# Patient Record
Sex: MALE | Race: WHITE | NOT HISPANIC OR LATINO | Employment: OTHER | URBAN - METROPOLITAN AREA
[De-identification: names, ages, dates, MRNs, and addresses within clinical notes are randomized per-mention and may not be internally consistent; named-entity substitution may affect disease eponyms.]

---

## 2017-02-13 ENCOUNTER — OFFICE VISIT (OUTPATIENT)
Dept: INTERNAL MEDICINE | Facility: MEDICAL CENTER | Age: 57
End: 2017-02-13
Payer: MEDICAID

## 2017-02-13 VITALS
BODY MASS INDEX: 23.13 KG/M2 | TEMPERATURE: 97.8 F | WEIGHT: 161.6 LBS | HEART RATE: 80 BPM | SYSTOLIC BLOOD PRESSURE: 144 MMHG | RESPIRATION RATE: 18 BRPM | OXYGEN SATURATION: 98 % | HEIGHT: 70 IN | DIASTOLIC BLOOD PRESSURE: 94 MMHG

## 2017-02-13 DIAGNOSIS — Z79.891 CHRONIC USE OF OPIATE FOR THERAPEUTIC PURPOSE: ICD-10-CM

## 2017-02-13 DIAGNOSIS — G43.C0 PERIODIC HEADACHE SYNDROME, NOT INTRACTABLE: ICD-10-CM

## 2017-02-13 DIAGNOSIS — M79.7 FIBROMYALGIA: ICD-10-CM

## 2017-02-13 DIAGNOSIS — R03.0 ELEVATED BLOOD-PRESSURE READING WITHOUT DIAGNOSIS OF HYPERTENSION: ICD-10-CM

## 2017-02-13 DIAGNOSIS — M06.9 RHEUMATOID ARTHRITIS, INVOLVING UNSPECIFIED SITE, UNSPECIFIED RHEUMATOID FACTOR PRESENCE: ICD-10-CM

## 2017-02-13 PROCEDURE — 99214 OFFICE O/P EST MOD 30 MIN: CPT | Mod: GC | Performed by: INTERNAL MEDICINE

## 2017-02-13 RX ORDER — OXYCODONE AND ACETAMINOPHEN 10; 325 MG/1; MG/1
1-2 TABLET ORAL EVERY 8 HOURS PRN
Qty: 90 TAB | Refills: 0 | Status: SHIPPED | OUTPATIENT
Start: 2017-02-17 | End: 2017-02-13 | Stop reason: SDUPTHER

## 2017-02-13 RX ORDER — MORPHINE SULFATE 30 MG/1
30 TABLET, FILM COATED, EXTENDED RELEASE ORAL 3 TIMES DAILY
Qty: 90 TAB | Refills: 0 | Status: SHIPPED | OUTPATIENT
Start: 2017-02-17 | End: 2017-02-13 | Stop reason: SDUPTHER

## 2017-02-13 RX ORDER — MORPHINE SULFATE 30 MG/1
30 TABLET, FILM COATED, EXTENDED RELEASE ORAL 3 TIMES DAILY
Qty: 90 TAB | Refills: 0 | Status: SHIPPED | OUTPATIENT
Start: 2017-03-17 | End: 2017-04-24

## 2017-02-13 RX ORDER — CYCLOBENZAPRINE HCL 10 MG
TABLET ORAL
Qty: 60 TAB | Refills: 0 | Status: SHIPPED | OUTPATIENT
Start: 2017-02-13 | End: 2017-04-24 | Stop reason: SDUPTHER

## 2017-02-13 RX ORDER — OXYCODONE AND ACETAMINOPHEN 10; 325 MG/1; MG/1
1-2 TABLET ORAL EVERY 8 HOURS PRN
Qty: 90 TAB | Refills: 0 | Status: SHIPPED | OUTPATIENT
Start: 2017-03-17 | End: 2017-04-24 | Stop reason: SDUPTHER

## 2017-02-13 ASSESSMENT — LIFESTYLE VARIABLES: HISTORY_ALCOHOL_USE: 0

## 2017-02-13 ASSESSMENT — ENCOUNTER SYMPTOMS: DEPRESSION: 0

## 2017-02-13 ASSESSMENT — PATIENT HEALTH QUESTIONNAIRE - PHQ9: CLINICAL INTERPRETATION OF PHQ2 SCORE: 0

## 2017-02-13 ASSESSMENT — PAIN SCALES - GENERAL: PAINLEVEL: 8=MODERATE-SEVERE PAIN

## 2017-02-13 NOTE — PROGRESS NOTES
Established Patient    Christophe presents today with the following:    CC: Medication refill    HPI:     Rheumatoid arthritis: Patient has pain in his ankles and knees and upper back and neck. He had not been taking his Cimzia due to a joint infection but he will be restarting it. He is needing a refill of the morphine and oxycodone. She does see Dr. Gomez for his rheumatoid arthritis.    Fibromyalgia: He states he has tried amitriptyline in the past with no improvement. He says the narcotics control his symptoms.    Migraines: He does get about 3 attacks per week which are relieved by Fioricet. He has never seen an urologist for this.    Elevated blood pressure readings: Patient's blood pressure today is 144/94. He denies any headaches, blurry vision, chest pain.        Patient Active Problem List    Diagnosis Date Noted   • Chronic use of opiate for therapeutic purpose 02/13/2017   • Elevated blood-pressure reading without diagnosis of hypertension 09/22/2016   • Right knee pain 09/20/2016   • Dyspnea 08/18/2016   • Chest pain 08/18/2016   • Headache, migraine 04/29/2016   • Fibromyalgia 04/29/2016   • Hypothyroidism 11/12/2015   • Rheumatoid arthritis (CMS-[facility]) 07/07/2009   • GERD (gastroesophageal reflux disease) 07/07/2009   • Other and unspecified hyperlipidemia 07/07/2009       Current Outpatient Prescriptions   Medication Sig Dispense Refill   • cyclobenzaprine (FLEXERIL) 10 MG Tab TAKE 1 TABLET BY MOUTH 2 TIMES A DAY AS NEEDED FOR MODERATE PAIN OR MUSCLE SPASMS 60 Tab 0   • [START ON 3/17/2017] morphine ER (MS CONTIN) 30 MG Tab CR tablet Take 1 Tab by mouth 3 times a day. 90 Tab 0   • [START ON 3/17/2017] oxycodone-acetaminophen (PERCOCET-10)  MG Tab Take 1-2 Tabs by mouth every 8 hours as needed for Severe Pain. 90 Tab 0   • levothyroxine (SYNTHROID) 150 MCG Tab TAKE 1 TABLET BY MOUTH EVERY MORNING BEFORE BREAKFAST. 30 Tab 0   • butalbital-acetaminophen-caffeine-codeine (FIORICET W/CODEINE)  "-79-30 MG per capsule TAKE 1 CAPSULE BY MOUTH EVERY 6 HOURS AS NEEDED FOR MIGRAINE HEADACHE 30 Cap 2   • ranitidine (ZANTAC) 300 MG tablet TAKE 1 TABLET BY MOUTH TWICE DAILY 60 Tab 5   • loratadine/pseudo SR (CLARITIN D) 5-120 MG TABLET SR 12 HR Take 1 Tab by mouth 2 times a day. 60 Tab 3   • fluticasone (FLONASE) 50 MCG/ACT nasal spray Spray 1 Spray in nose every day. 16 g 11   • Dextromethorphan-Guaifenesin (TUSSIN DM)  MG/5ML Syrup Take 10 mL by mouth every 6 hours as needed. 840 mL 0   • albuterol 108 (90 BASE) MCG/ACT Aero Soln inhalation aerosol Inhale 2 Puffs by mouth every 6 hours as needed for Shortness of Breath. 8.5 g 3   • Certolizumab Pegol (CIMZIA) 2 X 200 MG Kit Inject  as instructed.       No current facility-administered medications for this visit.       ROS: As per HPI. Additional pertinent symptoms as noted below.    All others negative    /94 mmHg  Pulse 80  Temp(Src) 36.6 °C (97.8 °F)  Resp 18  Ht 1.778 m (5' 10\")  Wt 73.301 kg (161 lb 9.6 oz)  BMI 23.19 kg/m2  SpO2 98%    Physical Exam   Constitutional:  oriented to person, place, and time. No distress.   Eyes: Pupils are equal, round, and reactive to light. No scleral icterus.  Cardiovascular: Normal rate, regular rhythm and normal heart sounds.  Exam reveals no gallop and no friction rub.  No murmur heard.  Pulmonary/Chest: Breath sounds normal. Chest wall is not dull to percussion.   Musculoskeletal:   no edema.   Neurological: alert and oriented to person, place, and time.       Assessment and Plan    1. Chronic use of opiate for therapeutic purpose  Chronic pain recheck:   Last dose of controlled substance: 2/13  Chronic pain treated with Morphine taken 3 times a day and Percocet taken 3 times a day   Current alcohol or substance use: no    Consequences of Chronic Opiate therapy:  (5 A's)  Analgesia:  not changed  Activity:  not changed  Adverse Events:  constipation  Aberrant Behaviors: no inappropriate refills " requested, lost or stolen meds reported.   Affect/Mood: normal speech pattern and content    Nonnarcotic treatments that are being used: none    Pain management agreement initiated/updated and signed on: Dec 2016  Urine drug screen done: Dec 5, 2016, which was reviewed today and is consistent with prescribed medications.    I have reviewed the medical records, the Prescription Monitoring Program and I have determined that controlled substance treatment is medically indicated.      2. Periodic headache syndrome, not intractable  - Patient has migraines 3 times per week which are relieved by Fioricet  - Patient referred to neurology for migraine prophylaxis and further evaluation    3. Fibromyalgia  - Patient states he has tried amitriptyline in the past without success  - He states the narcotics control his pain. Patient counseled on narcotics are not medications used to treat fibromyalgia.  - Patient educated and informed that we will be slowly tapering his narcotics. The plan for his next visit will be to change either the morphine extended release to 30 mg twice a day or decrease the Percocets to 5 mg 3 times a day.  - Physical therapy and pain management referral placed    4. Rheumatoid arthritis, involving unspecified site, unspecified rheumatoid factor presence (CMS-HCC)  - Patient is currently taking Cimzia and narcotics for this  - He states his symptoms are best relieved when he was taking prednisone  - Patient advised to speak with his rheumatologist Dr. Gomez in regards to this    5. Elevated blood-pressure reading without diagnosis of hypertension  - Patient's blood pressure is 144/94  - We'll have patient come in 1-2 weeks for nurse's visit to recheck blood pressure  - If blood pressure remains elevated with systolic over 140 or diastolic above 90 then at that time we'll start chlorthalidone and repeat BMP to be done one week after having started medication      Followup: Return in about 10 weeks  (around 4/24/2017) for pain med refill. Schedule nurses visit in 2 weeks for BP check.      Signed by: Itz Hogan M.D.

## 2017-02-13 NOTE — MR AVS SNAPSHOT
"        Christophe Leary   2017 1:15 PM   Office Visit   MRN: [id number]    Department:  [facility] - Internal Med   Dept Phone:  [phone]    Description:  Male : 1960   Provider:  Itz Hogan M.D.           Reason for Visit     Back Pain medication refill      Allergies as of 2017     No Known Allergies      You were diagnosed with     Chronic use of opiate for therapeutic purpose   [8460567]       Periodic headache syndrome, not intractable   [500538]       Fibromyalgia   [670824]       Rheumatoid arthritis, involving unspecified site, unspecified rheumatoid factor presence (CMS-HCC)   [7064558]       Rheumatoid arthritis involving left hand, unspecified rheumatoid factor presence (CMS-[facility])   [1433456]       Elevated blood-pressure reading without diagnosis of hypertension   [796671]         Vital Signs     Blood Pressure Pulse Temperature Respirations Height Weight    144/94 mmHg 80 36.6 °C (97.8 °F) 18 1.778 m (5' 10\") 73.301 kg (161 lb 9.6 oz)    Body Mass Index Oxygen Saturation Smoking Status             23.19 kg/m2 98% Never Smoker          Basic Information     Date Of Birth Sex Race Ethnicity Preferred Language    1960 Male White Non- English      Your appointments     Mar 03, 2017  1:30 PM   Non Provider 1 with [facility] - Internal Medicine (--)    [address] [place] [id number] [phone]           You will be receiving a confirmation call a few days before your appointment from our automated call confirmation system.            2017  1:45 PM   Established Patient with Itz Hogan M.D.   [facility] - Internal Medicine (--)    [address] [place] [id number] [phone]           You will be receiving a confirmation call a few days before your appointment from our automated call confirmation system.              Problem List              ICD-10-CM Priority Class " Noted - Resolved    Rheumatoid arthritis (CMS-HCC) M06.9   7/7/2009 - Present    GERD (gastroesophageal reflux disease) K21.9   7/7/2009 - Present    Other and unspecified hyperlipidemia E78.5   7/7/2009 - Present    Hypothyroidism E03.9   11/12/2015 - Present    Headache, migraine G43.909   4/29/2016 - Present    Fibromyalgia M79.7   4/29/2016 - Present    Dyspnea R06.00   8/18/2016 - Present    Chest pain R07.9   8/18/2016 - Present    Right knee pain M25.561   9/20/2016 - Present    Elevated blood-pressure reading without diagnosis of hypertension R03.0   9/22/2016 - Present    Chronic use of opiate for therapeutic purpose Z79.899   2/13/2017 - Present      Health Maintenance        Date Due Completion Dates    IMM INFLUENZA (1) 9/1/2016 11/15/2015, 12/23/2014    IMM DTaP/Tdap/Td Vaccine (2 - Td) 5/4/2026 5/4/2016, 8/10/2009    COLONOSCOPY 2/9/2027 2/9/2017 (N/S)    Override on 2/9/2017: (N/S) (PER [facility] NO COLONOSCOPY)            Current Immunizations     Influenza Vaccine Quad Inj (Preserved) 11/15/2015 10:25 AM, 12/23/2014    Pneumococcal polysaccharide vaccine (PPSV-23) 11/15/2015 10:25 AM    TD Vaccine 8/10/2009  5:00 PM    Tdap Vaccine 5/4/2016      Below and/or attached are the medications your provider expects you to take. Review all of your home medications and newly ordered medications with your provider and/or pharmacist. Follow medication instructions as directed by your provider and/or pharmacist. Please keep your medication list with you and share with your provider. Update the information when medications are discontinued, doses are changed, or new medications (including over-the-counter products) are added; and carry medication information at all times in the event of emergency situations     Allergies:  No Known Allergies          Medications  Valid as of: February 13, 2017 -  2:25 PM    Generic Name Brand Name Tablet Size Instructions for use    Albuterol Sulfate (Aero Soln) albuterol 108 (90  BASE) MCG/ACT Inhale 2 Puffs by mouth every 6 hours as needed for Shortness of Breath.        Butalbital-APAP-Caff-Cod (Cap) FIORICET W/CODEINE -48-30 MG TAKE 1 CAPSULE BY MOUTH EVERY 6 HOURS AS NEEDED FOR MIGRAINE HEADACHE        Certolizumab Pegol (Kit) Certolizumab Pegol 2 X 200 MG Inject  as instructed.        Cyclobenzaprine HCl (Tab) FLEXERIL 10 MG TAKE 1 TABLET BY MOUTH 2 TIMES A DAY AS NEEDED FOR MODERATE PAIN OR MUSCLE SPASMS        Dextromethorphan-Guaifenesin (Syrup) TUSSIN DM  MG/5ML Take 10 mL by mouth every 6 hours as needed.        Fluticasone Propionate (Suspension) FLONASE 50 MCG/ACT Spray 1 Spray in nose every day.        Levothyroxine Sodium (Tab) SYNTHROID 150 MCG TAKE 1 TABLET BY MOUTH EVERY MORNING BEFORE BREAKFAST.        Loratadine-Pseudoephedrine (TABLET SR 12 HR) CLARITIN D 5-120 MG Take 1 Tab by mouth 2 times a day.        Morphine Sulfate (Tab CR) MS CONTIN 30 MG Take 1 Tab by mouth 3 times a day.        Oxycodone-Acetaminophen (Tab) PERCOCET-10  MG Take 1-2 Tabs by mouth every 8 hours as needed for Severe Pain.        RaNITidine HCl (Tab) ZANTAC 300 MG TAKE 1 TABLET BY MOUTH TWICE DAILY        .                 Medicines prescribed today were sent to:     [id number] - [place], [place] - [address] [address]    Phone: [phone] Fax: [phone]    Open 24 Hours?: No      Medication refill instructions:       If your prescription bottle indicates you have medication refills left, it is not necessary to call your provider’s office. Please contact your pharmacy and they will refill your medication.    If your prescription bottle indicates you do not have any refills left, you may request refills at any time through one of the following ways: The online [employer] system (except Urgent Care), by calling your provider’s office, or by asking your pharmacy to contact your provider’s office with a refill request. Medication refills are  processed only during regular business hours and may not be available until the next business day. Your provider may request additional information or to have a follow-up visit with you prior to refilling your medication.   *Please Note: Medication refills are assigned a new Rx number when refilled electronically. Your pharmacy may indicate that no refills were authorized even though a new prescription for the same medication is available at the pharmacy. Please request the medicine by name with the pharmacy before contacting your provider for a refill.        Referral     A referral request has been sent to our patient care coordination department. Please allow 3-5 business days for us to process this request and contact you either by phone or mail. If you do not hear from us by the 5th business day, please call us at [phone].           MyChart Status: Patient Declined

## 2017-03-20 DIAGNOSIS — E03.9 HYPOTHYROIDISM, UNSPECIFIED TYPE: ICD-10-CM

## 2017-03-20 RX ORDER — LEVOTHYROXINE SODIUM 0.15 MG/1
150 TABLET ORAL
Qty: 30 TAB | Refills: 2 | Status: SHIPPED | OUTPATIENT
Start: 2017-03-20 | End: 2017-07-30 | Stop reason: SDUPTHER

## 2017-03-20 NOTE — TELEPHONE ENCOUNTER
Last seen: 2/13/17 by Dr. Hogan  Next appt: 4/24/17 with Dr. Hogan    Was the patient seen in the last year in this department? Yes   Does patient have an active prescription for medications requested? No   Received Request Via: Pharmacy

## 2017-04-20 ENCOUNTER — OFFICE VISIT (OUTPATIENT)
Dept: NEUROLOGY | Facility: MEDICAL CENTER | Age: 57
End: 2017-04-20
Payer: MEDICAID

## 2017-04-20 VITALS
HEIGHT: 70 IN | TEMPERATURE: 98.8 F | SYSTOLIC BLOOD PRESSURE: 140 MMHG | WEIGHT: 164 LBS | BODY MASS INDEX: 23.48 KG/M2 | OXYGEN SATURATION: 92 % | DIASTOLIC BLOOD PRESSURE: 86 MMHG | HEART RATE: 89 BPM

## 2017-04-20 DIAGNOSIS — G43.009 MIGRAINE WITHOUT AURA AND WITHOUT STATUS MIGRAINOSUS, NOT INTRACTABLE: Primary | ICD-10-CM

## 2017-04-20 PROCEDURE — 99213 OFFICE O/P EST LOW 20 MIN: CPT | Performed by: PSYCHIATRY & NEUROLOGY

## 2017-04-20 RX ORDER — SUMATRIPTAN 100 MG/1
TABLET, FILM COATED ORAL
Qty: 9 TAB | Refills: 6 | Status: SHIPPED | OUTPATIENT
Start: 2017-04-20 | End: 2017-05-31 | Stop reason: SINTOL

## 2017-04-20 ASSESSMENT — ENCOUNTER SYMPTOMS: HEADACHES: 1

## 2017-04-21 NOTE — PROGRESS NOTES
"Subjective:      Christophe Leary is a 56 y.o. male who presents for follow-up with a history of headaches.    HPI    Patient states that he has been suffering from headaches for most of his life, but over the last year or so, they seem to have become a more persistent problem. He describes milder daily headaches which are holocephalic, not incapacitating, and for which he takes aspirin. Migraines themselves started in his childhood, he describes some visual symptoms preceding the headaches on occasion, most of the time it is retro-orbital, bilateral, excruciatingly painful, with nausea, impaired concentration, hypersensitivities, etc. The headaches now occur maybe once every month or so, but they are unpredictable, he has never been able to identify triggers. He has not been on prescription medications other than Fioricet with codeine, his previously prescribing physician will no longer fill his prescriptions, he now presents requesting more medication. There has been no change in his headache pattern recently.    Medical, surgical and family histories are reviewed, and there are no new drug allergies. Is on Synthroid, Zantac, Percocet, the rest as per the electronic health record, noncontributory from my standpoint.    Review of Systems   Neurological: Positive for headaches.   All other systems reviewed and are negative.       Objective:     /86 mmHg  Pulse 89  Temp(Src) 37.1 °C (98.8 °F)  Ht 1.778 m (5' 10\")  Wt 74.39 kg (164 lb)  BMI 23.53 kg/m2  SpO2 92%     Physical Exam    He appears no acute distress. Vital signs are stable. There is no malar rash. The neck is supple. Including mental status, cranial nerves, motor, reflexes, coordination, and sensory evaluations, the examination is fully intact.     Assessment/Plan:     1. Migraine without aura and without status migrainosus, not intractable  He does want a more effective rescue regimen, the headaches are not frequent enough that I think it " warrants maintenance therapies. He was warned about taking too much aspirin. He is using Percocet for other pain symptoms, but this too can play a role if he has had to use it on a daily basis. He was told that Fioricet with and without codeine is not prescribed to this office, but Imitrex 100 mg tablets can be used instead, and it is more effective. The nature of the drug was reviewed, side effects as well. He knows the difference between his milder headaches and the more severe attacks, he was educated as to taking the medication as early as is possible. Repeat the dose in one hour if needed. I can follow up with him in the future as needed.    - sumatriptan (IMITREX) 100 MG tablet; 1 tab at headache onset; repeat in 1 hour prn  Dispense: 9 Tab; Refill: 6    Time: Evaluation for 20 minutes for exam, review, discussion, and education  Discussion: As mentioned in the assessment, over 60% of the time spent face-to-face counseling and coordinating care

## 2017-04-24 ENCOUNTER — OFFICE VISIT (OUTPATIENT)
Dept: INTERNAL MEDICINE | Facility: MEDICAL CENTER | Age: 57
End: 2017-04-24
Payer: MEDICAID

## 2017-04-24 VITALS
SYSTOLIC BLOOD PRESSURE: 170 MMHG | HEIGHT: 70 IN | OXYGEN SATURATION: 97 % | HEART RATE: 102 BPM | WEIGHT: 167 LBS | BODY MASS INDEX: 23.91 KG/M2 | DIASTOLIC BLOOD PRESSURE: 98 MMHG | TEMPERATURE: 98.5 F | RESPIRATION RATE: 18 BRPM

## 2017-04-24 DIAGNOSIS — I10 ESSENTIAL HYPERTENSION: ICD-10-CM

## 2017-04-24 DIAGNOSIS — M06.9 RHEUMATOID ARTHRITIS, INVOLVING UNSPECIFIED SITE, UNSPECIFIED RHEUMATOID FACTOR PRESENCE: ICD-10-CM

## 2017-04-24 DIAGNOSIS — Z79.891 CHRONIC USE OF OPIATE FOR THERAPEUTIC PURPOSE: ICD-10-CM

## 2017-04-24 DIAGNOSIS — M79.7 FIBROMYALGIA: ICD-10-CM

## 2017-04-24 DIAGNOSIS — G43.009 MIGRAINE WITHOUT AURA AND WITHOUT STATUS MIGRAINOSUS, NOT INTRACTABLE: ICD-10-CM

## 2017-04-24 PROCEDURE — 99214 OFFICE O/P EST MOD 30 MIN: CPT | Mod: GC | Performed by: INTERNAL MEDICINE

## 2017-04-24 RX ORDER — BUTALBITAL, ACETAMINOPHEN, CAFFEINE AND CODEINE PHOSPHATE 50; 325; 40; 30 MG/1; MG/1; MG/1; MG/1
1 CAPSULE ORAL
Qty: 15 CAP | Refills: 0 | Status: CANCELLED | OUTPATIENT
Start: 2017-04-24

## 2017-04-24 RX ORDER — BUTALBITAL, ACETAMINOPHEN, CAFFEINE AND CODEINE PHOSPHATE 50; 325; 40; 30 MG/1; MG/1; MG/1; MG/1
CAPSULE ORAL
COMMUNITY
Start: 2017-02-25 | End: 2017-04-24

## 2017-04-24 RX ORDER — MORPHINE SULFATE 30 MG/1
30 TABLET, FILM COATED, EXTENDED RELEASE ORAL EVERY 12 HOURS
Qty: 14 TAB | Refills: 0 | Status: SHIPPED | OUTPATIENT
Start: 2017-05-22 | End: 2017-05-31 | Stop reason: SDUPTHER

## 2017-04-24 RX ORDER — OXYCODONE AND ACETAMINOPHEN 10; 325 MG/1; MG/1
1 TABLET ORAL EVERY 8 HOURS PRN
Qty: 90 TAB | Refills: 0 | Status: CANCELLED | OUTPATIENT
Start: 2017-04-24

## 2017-04-24 RX ORDER — OXYCODONE AND ACETAMINOPHEN 10; 325 MG/1; MG/1
1 TABLET ORAL EVERY 8 HOURS PRN
Qty: 21 TAB | Refills: 0 | Status: SHIPPED | OUTPATIENT
Start: 2017-05-22 | End: 2017-05-31 | Stop reason: SDUPTHER

## 2017-04-24 RX ORDER — MORPHINE SULFATE 30 MG/1
30 TABLET, FILM COATED, EXTENDED RELEASE ORAL EVERY 12 HOURS
Qty: 60 TAB | Refills: 0 | Status: SHIPPED | OUTPATIENT
Start: 2017-04-24 | End: 2017-04-24 | Stop reason: SDUPTHER

## 2017-04-24 RX ORDER — MORPHINE SULFATE 30 MG/1
30 TABLET, FILM COATED, EXTENDED RELEASE ORAL 3 TIMES DAILY
Qty: 90 TAB | Refills: 0 | Status: CANCELLED | OUTPATIENT
Start: 2017-04-24

## 2017-04-24 RX ORDER — CHLORTHALIDONE 25 MG/1
25 TABLET ORAL DAILY
Qty: 30 TAB | Refills: 1 | Status: SHIPPED | OUTPATIENT
Start: 2017-04-24 | End: 2017-11-02

## 2017-04-24 RX ORDER — OXYCODONE AND ACETAMINOPHEN 10; 325 MG/1; MG/1
1 TABLET ORAL EVERY 8 HOURS PRN
Qty: 90 TAB | Refills: 0 | Status: SHIPPED | OUTPATIENT
Start: 2017-04-24 | End: 2017-04-24 | Stop reason: SDUPTHER

## 2017-04-24 ASSESSMENT — PAIN SCALES - GENERAL: PAINLEVEL: 8=MODERATE-SEVERE PAIN

## 2017-04-24 ASSESSMENT — PATIENT HEALTH QUESTIONNAIRE - PHQ9: CLINICAL INTERPRETATION OF PHQ2 SCORE: 0

## 2017-04-24 NOTE — PROGRESS NOTES
Established Patient    Christophe presents today with the following:    CC: medication refill    HPI:     57 y/o M presenting with:     Fibromyalgia/chronic pain/rheumatoid arthritis: Up until now patient has been on MS Contin 30 mg 3 times a day and Percocet 10 mg 3 times a day. He states he has been on this medication regimen for over 10 years now. During last visit we had discussed going down on his narcotics and setting him up with pain management.    Hypertension: Patient has had his blood pressure up in the 140s systolic for his last several visits and today it was 170 and the recheck was 150. He denies any headaches, chest pain, abdominal pain, blurry vision.    Migraines: He was previously on Fioricet but this is discontinued. Patient did see Dr. Magaña with neurology several days back and he did not feel that patient's daily headaches were migrainous in origin. Although he did prescribe him any tracks for the more severe headaches which did sound more like a migraine. He is currently headache free and denies any photophobia, nausea, vomiting.    Patient Active Problem List    Diagnosis Date Noted   • Chronic use of opiate for therapeutic purpose 02/13/2017   • Essential hypertension 09/22/2016   • Right knee pain 09/20/2016   • Dyspnea 08/18/2016   • Chest pain 08/18/2016   • Headache, migraine 04/29/2016   • Fibromyalgia 04/29/2016   • Rheumatoid arthritis (CMS-[facility]) 07/07/2009   • GERD (gastroesophageal reflux disease) 07/07/2009   • Other and unspecified hyperlipidemia 07/07/2009       Current Outpatient Prescriptions   Medication Sig Dispense Refill   • ipratropium-albuterol (COMBIVENT RESPIMAT)  MCG/ACT Aero Soln Inhale 2 Puffs by mouth every day. 1 Inhaler 2   • [START ON 5/22/2017] morphine ER (MS CONTIN) 30 MG Tab CR tablet Take 1 Tab by mouth every 12 hours. 14 Tab 0   • [START ON 5/22/2017] oxycodone-acetaminophen (PERCOCET-10)  MG Tab Take 1 Tab by mouth every 8 hours as needed for  "Severe Pain. 21 Tab 0   • chlorthalidone (HYGROTON) 25 MG Tab Take 1 Tab by mouth every day. 30 Tab 1   • sumatriptan (IMITREX) 100 MG tablet 1 tab at headache onset; repeat in 1 hour prn 9 Tab 6   • levothyroxine (SYNTHROID) 150 MCG Tab Take 1 Tab by mouth Every morning on an empty stomach. 30 Tab 2   • ranitidine (ZANTAC) 300 MG tablet TAKE 1 TABLET BY MOUTH TWICE DAILY 60 Tab 5   • Certolizumab Pegol (CIMZIA) 2 X 200 MG Kit Inject  as instructed.     • cyclobenzaprine (FLEXERIL) 10 MG Tab TAKE 1 TABLET BY MOUTH 2 TIMES A DAY AS NEEDED FOR MODERATE PAIN OR MUSCLE SPASMS 60 Tab 0     No current facility-administered medications for this visit.       ROS: As per HPI. Additional pertinent symptoms as noted below.    All others negative    /98 mmHg  Pulse 102  Temp(Src) 36.9 °C (98.5 °F)  Resp 18  Ht 1.778 m (5' 10\")  Wt 75.751 kg (167 lb)  BMI 23.96 kg/m2  SpO2 97%    Physical Exam   Constitutional:  oriented to person, place, and time. No distress.   Eyes: Pupils are equal, round, and reactive to light. No scleral icterus.  Cardiovascular: Normal rate, regular rhythm and normal heart sounds.  Exam reveals no gallop and no friction rub.  No murmur heard.  Pulmonary/Chest: Breath sounds normal. Chest wall is not dull to percussion.   Musculoskeletal:   no edema.   Neurological: alert and oriented to person, place, and time.   Skin: No cyanosis. Nails show no clubbing.        Assessment and Plan    1. Chronic use of opiate for therapeutic purpose  Chronic pain recheck:   Last dose of controlled substance: 4/21/2017  Chronic pain treated with MS Contin 30 mg taken 3 times a day and Percocet 10 mg 3 times a day  Current alcohol or substance use: no    Consequences of Chronic Opiate therapy:  (5 A's)  Analgesia:  not changed  Activity:  not changed  Adverse Events:  none  Aberrant Behaviors: no inappropriate refills requested, lost or stolen meds reported.   Affect/Mood: normal reasoning    Nonnarcotic " treatments that are being used: flexeril    Pain management agreement initiated/updated and signed on: Dec 2016  Urine drug screen done: Dec 5, 2016 , which was reviewed today and is consistent with prescribed medications.    I have reviewed the medical records, the Prescription Monitoring Program and I have determined that controlled substance treatment is medically indicated.      2. Fibromyalgia  - At length discussion with patient that narcotics and other treatment for fibromyalgia  - He states he has tried multiple medications in the past without relief  - Patient asked to inform clinic with list of medications he is tried in the past and may try other medications  - He has tried physical therapy in the past without relief    3. Essential hypertension  - Several readings of elevated elevated blood pressure when presenting to clinic  - We'll start patient on chlorthalidone 25 mg daily and patient has been advised to get a BMP done next week    4. Rheumatoid arthritis, involving unspecified site, unspecified rheumatoid factor presence (CMS-HCC)  - We will go down on MS Contin from 30 mg 3 times a day to 30 mg twice a day  - During next office visit the plan is to go down on the Percocet from 10 mg 3 times a day to 10 mg twice a day  - Patient still has not started taking his Cimzia as he needs to get it refilled and states he will start taking it soon. Anticipate some of his pain will be relieved once he starts his medication.    5. Migraine without aura and without status migrainosus, not intractable  - Continue Imitrex as needed as. Neurology      Followup: Return in about 5 weeks (around 5/29/2017) for Long.      Signed by: Itz Hogan M.D.

## 2017-04-24 NOTE — MR AVS SNAPSHOT
"        Christophe Leary   2017 1:45 PM   Office Visit   MRN: [id number]    Department:  [facility] Med - Internal Med   Dept Phone:  [phone]    Description:  Male : 1960   Provider:  Itz Hogan M.D.           Reason for Visit     Chronic Opiate Therapy refill pain medications      Allergies as of 2017     No Known Allergies      You were diagnosed with     Chronic use of opiate for therapeutic purpose   [0793295]       Fibromyalgia   [873301]       Rheumatoid arthritis, involving unspecified site, unspecified rheumatoid factor presence (CMS-HCC)   [6247422]       Migraine without aura and without status migrainosus, not intractable   [195641]       Essential hypertension   [7799166]         Vital Signs     Blood Pressure Pulse Temperature Respirations Height Weight    170/98 mmHg 102 36.9 °C (98.5 °F) 18 1.778 m (5' 10\") 75.751 kg (167 lb)    Body Mass Index Oxygen Saturation Smoking Status             23.96 kg/m2 97% Never Smoker          Basic Information     Date Of Birth Sex Race Ethnicity Preferred Language    1960 Male White Non- English      Your appointments     May 31, 2017  3:00 PM   Established Patient with Itz Hogan M.D.   [facility] / [facility] Med - Internal Medicine (--)    [address] [place] [id number] [phone]           You will be receiving a confirmation call a few days before your appointment from our automated call confirmation system.              Problem List              ICD-10-CM Priority Class Noted - Resolved    Rheumatoid arthritis (CMS-HCC) M06.9   2009 - Present    GERD (gastroesophageal reflux disease) K21.9   2009 - Present    Other and unspecified hyperlipidemia E78.5   2009 - Present    Headache, migraine G43.909   2016 - Present    Fibromyalgia M79.7   2016 - Present    Dyspnea R06.00   2016 - Present    Chest pain R07.9   2016 - Present    Right knee pain M25.561   2016 - " Present    Essential hypertension I10   9/22/2016 - Present    Chronic use of opiate for therapeutic purpose Z79.899   2/13/2017 - Present      Health Maintenance        Date Due Completion Dates    IMM DTaP/Tdap/Td Vaccine (2 - Td) 5/4/2026 5/4/2016, 8/10/2009    COLONOSCOPY 2/9/2027 2/9/2017 (N/S)    Override on 2/9/2017: (N/S) (PER [facility] NO COLONOSCOPY)            Current Immunizations     Influenza Vaccine Quad Inj (Preserved) 11/15/2015 10:25 AM, 12/23/2014    Pneumococcal polysaccharide vaccine (PPSV-23) 11/15/2015 10:25 AM    TD Vaccine 8/10/2009  5:00 PM    Tdap Vaccine 5/4/2016      Below and/or attached are the medications your provider expects you to take. Review all of your home medications and newly ordered medications with your provider and/or pharmacist. Follow medication instructions as directed by your provider and/or pharmacist. Please keep your medication list with you and share with your provider. Update the information when medications are discontinued, doses are changed, or new medications (including over-the-counter products) are added; and carry medication information at all times in the event of emergency situations     Allergies:  No Known Allergies          Medications  Valid as of: April 24, 2017 -  3:35 PM    Generic Name Brand Name Tablet Size Instructions for use    Certolizumab Pegol (Kit) Certolizumab Pegol 2 X 200 MG Inject  as instructed.        Chlorthalidone (Tab) HYGROTON 25 MG Take 1 Tab by mouth every day.        Cyclobenzaprine HCl (Tab) FLEXERIL 10 MG TAKE 1 TABLET BY MOUTH 2 TIMES A DAY AS NEEDED FOR MODERATE PAIN OR MUSCLE SPASMS        Ipratropium-Albuterol (Aero Soln) COMBIVENT RESPIMAT  MCG/ACT Inhale 2 Puffs by mouth every day.        Levothyroxine Sodium (Tab) SYNTHROID 150 MCG Take 1 Tab by mouth Every morning on an empty stomach.        Morphine Sulfate (Tab CR) MS CONTIN 30 MG Take 1 Tab by mouth every 12 hours.        Oxycodone-Acetaminophen (Tab) PERCOCET-10   MG Take 1 Tab by mouth every 8 hours as needed for Severe Pain.        RaNITidine HCl (Tab) ZANTAC 300 MG TAKE 1 TABLET BY MOUTH TWICE DAILY        SUMAtriptan Succinate (Tab) IMITREX 100 MG 1 tab at headache onset; repeat in 1 hour prn        .                 Medicines prescribed today were sent to:     [id number] - [place], [place] - [address]    [address] [place] [address]    Phone: [phone] Fax: [phone]    Open 24 Hours?: No      Medication refill instructions:       If your prescription bottle indicates you have medication refills left, it is not necessary to call your provider’s office. Please contact your pharmacy and they will refill your medication.    If your prescription bottle indicates you do not have any refills left, you may request refills at any time through one of the following ways: The online [employer] system (except Urgent Care), by calling your provider’s office, or by asking your pharmacy to contact your provider’s office with a refill request. Medication refills are processed only during regular business hours and may not be available until the next business day. Your provider may request additional information or to have a follow-up visit with you prior to refilling your medication.   *Please Note: Medication refills are assigned a new Rx number when refilled electronically. Your pharmacy may indicate that no refills were authorized even though a new prescription for the same medication is available at the pharmacy. Please request the medicine by name with the pharmacy before contacting your provider for a refill.        Your To Do List     Future Labs/Procedures Complete By Expires    BASIC METABOLIC PANEL  As directed 4/24/2018         [employer] Status: Patient Declined

## 2017-05-25 RX ORDER — CYCLOBENZAPRINE HCL 10 MG
TABLET ORAL
Qty: 60 TAB | Refills: 0 | Status: SHIPPED | OUTPATIENT
Start: 2017-05-25 | End: 2017-07-30 | Stop reason: SDUPTHER

## 2017-05-25 NOTE — TELEPHONE ENCOUNTER
Last seen: 04/24/17 by Dr. Hogan  Next appt: 05/31/17 with Dr. Hogan    Was the patient seen in the last year in this department? Yes   Does patient have an active prescription for medications requested? No   Received Request Via: Pharmacy

## 2017-05-31 ENCOUNTER — HOSPITAL ENCOUNTER (OUTPATIENT)
Dept: LAB | Facility: MEDICAL CENTER | Age: 57
End: 2017-05-31
Attending: HOSPITALIST
Payer: MEDICAID

## 2017-05-31 ENCOUNTER — OFFICE VISIT (OUTPATIENT)
Dept: INTERNAL MEDICINE | Facility: MEDICAL CENTER | Age: 57
End: 2017-05-31
Payer: MEDICAID

## 2017-05-31 VITALS
TEMPERATURE: 97.2 F | RESPIRATION RATE: 18 BRPM | WEIGHT: 165.8 LBS | HEIGHT: 70 IN | SYSTOLIC BLOOD PRESSURE: 140 MMHG | OXYGEN SATURATION: 96 % | DIASTOLIC BLOOD PRESSURE: 94 MMHG | BODY MASS INDEX: 23.74 KG/M2 | HEART RATE: 88 BPM

## 2017-05-31 DIAGNOSIS — M06.9 RHEUMATOID ARTHRITIS, INVOLVING UNSPECIFIED SITE, UNSPECIFIED RHEUMATOID FACTOR PRESENCE: ICD-10-CM

## 2017-05-31 DIAGNOSIS — M25.561 CHRONIC PAIN OF RIGHT KNEE: ICD-10-CM

## 2017-05-31 DIAGNOSIS — G43.009 MIGRAINE WITHOUT AURA AND WITHOUT STATUS MIGRAINOSUS, NOT INTRACTABLE: ICD-10-CM

## 2017-05-31 DIAGNOSIS — M25.511 CHRONIC PAIN OF BOTH SHOULDERS: ICD-10-CM

## 2017-05-31 DIAGNOSIS — G89.29 CHRONIC PAIN OF BOTH SHOULDERS: ICD-10-CM

## 2017-05-31 DIAGNOSIS — M25.512 CHRONIC PAIN OF BOTH SHOULDERS: ICD-10-CM

## 2017-05-31 DIAGNOSIS — Z79.891 CHRONIC USE OF OPIATE FOR THERAPEUTIC PURPOSE: ICD-10-CM

## 2017-05-31 DIAGNOSIS — M79.7 FIBROMYALGIA: ICD-10-CM

## 2017-05-31 DIAGNOSIS — G89.29 CHRONIC PAIN OF RIGHT KNEE: ICD-10-CM

## 2017-05-31 DIAGNOSIS — I10 ESSENTIAL HYPERTENSION: ICD-10-CM

## 2017-05-31 LAB
ANION GAP SERPL CALC-SCNC: 5 MMOL/L (ref 0–11.9)
BUN SERPL-MCNC: 18 MG/DL (ref 8–22)
CALCIUM SERPL-MCNC: 8.9 MG/DL (ref 8.5–10.5)
CHLORIDE SERPL-SCNC: 108 MMOL/L (ref 96–112)
CO2 SERPL-SCNC: 28 MMOL/L (ref 20–33)
CREAT SERPL-MCNC: 0.93 MG/DL (ref 0.5–1.4)
GFR SERPL CREATININE-BSD FRML MDRD: >60 ML/MIN/1.73 M 2
GLUCOSE SERPL-MCNC: 102 MG/DL (ref 65–99)
POTASSIUM SERPL-SCNC: 4.3 MMOL/L (ref 3.6–5.5)
SODIUM SERPL-SCNC: 141 MMOL/L (ref 135–145)

## 2017-05-31 PROCEDURE — 80048 BASIC METABOLIC PNL TOTAL CA: CPT

## 2017-05-31 PROCEDURE — 99213 OFFICE O/P EST LOW 20 MIN: CPT | Mod: GE | Performed by: INTERNAL MEDICINE

## 2017-05-31 PROCEDURE — 36415 COLL VENOUS BLD VENIPUNCTURE: CPT

## 2017-05-31 RX ORDER — MORPHINE SULFATE 30 MG/1
30 TABLET, FILM COATED, EXTENDED RELEASE ORAL EVERY 12 HOURS
Qty: 60 TAB | Refills: 0 | Status: SHIPPED | OUTPATIENT
Start: 2017-06-30 | End: 2017-05-31 | Stop reason: SDUPTHER

## 2017-05-31 RX ORDER — OXYCODONE AND ACETAMINOPHEN 10; 325 MG/1; MG/1
1 TABLET ORAL EVERY 8 HOURS PRN
Qty: 90 TAB | Refills: 0 | Status: SHIPPED | OUTPATIENT
Start: 2017-05-31 | End: 2017-05-31 | Stop reason: SDUPTHER

## 2017-05-31 RX ORDER — OXYCODONE AND ACETAMINOPHEN 10; 325 MG/1; MG/1
1 TABLET ORAL EVERY 8 HOURS PRN
Qty: 90 TAB | Refills: 0 | Status: SHIPPED | OUTPATIENT
Start: 2017-06-30 | End: 2017-05-31 | Stop reason: SDUPTHER

## 2017-05-31 RX ORDER — MORPHINE SULFATE 30 MG/1
30 TABLET, FILM COATED, EXTENDED RELEASE ORAL EVERY 12 HOURS
Qty: 60 TAB | Refills: 0 | Status: SHIPPED | OUTPATIENT
Start: 2017-05-31 | End: 2017-05-31 | Stop reason: SDUPTHER

## 2017-05-31 RX ORDER — BUTALBITAL, ACETAMINOPHEN AND CAFFEINE 50; 325; 40 MG/1; MG/1; MG/1
1 TABLET ORAL EVERY 4 HOURS PRN
Qty: 10 TAB | Refills: 0 | Status: SHIPPED | OUTPATIENT
Start: 2017-05-31 | End: 2017-09-11 | Stop reason: SDUPTHER

## 2017-05-31 RX ORDER — OXYCODONE AND ACETAMINOPHEN 10; 325 MG/1; MG/1
1 TABLET ORAL EVERY 8 HOURS PRN
Qty: 90 TAB | Refills: 0 | Status: SHIPPED | OUTPATIENT
Start: 2017-07-30 | End: 2017-08-30 | Stop reason: SDUPTHER

## 2017-05-31 RX ORDER — MORPHINE SULFATE 30 MG/1
30 TABLET, FILM COATED, EXTENDED RELEASE ORAL EVERY 12 HOURS
Qty: 60 TAB | Refills: 0 | Status: SHIPPED | OUTPATIENT
Start: 2017-07-30 | End: 2017-08-30 | Stop reason: SDUPTHER

## 2017-05-31 ASSESSMENT — PATIENT HEALTH QUESTIONNAIRE - PHQ9: CLINICAL INTERPRETATION OF PHQ2 SCORE: 0

## 2017-05-31 ASSESSMENT — PAIN SCALES - GENERAL: PAINLEVEL: 7=MODERATE-SEVERE PAIN

## 2017-05-31 NOTE — PROGRESS NOTES
Established Patient    Christophe presents today with the following:    CC: medication refill    HPI:     58 y/o M presenting with:    Rheumatoid arthritis: He see's Dr. Gomez for this. His cimzia injections had been discontinued due to pneumonia and knee infection. This was restarted several weeks ago. He has not yet noticed any improvement with this.     Essential hypertension: his BP at last visit was 170/94 so he was started on chlorthalidone but he has not been taking this because when he checks his BP at home it is usually well controlled with SBO in 130s. He denies any chest pain or headackes.     Chronic right knee pain: had right knee arthroplasty in 2013 and has recently pain experiencing worsening pain. Ortho referrals have been placed but have been refused thus far. The surgeon who had previously operated on him has retired.     Chronic pain of bilateral shoulders: He has been having worsening pain in both shoulders over the last several years. He last had an x-ray of his right shoulder in 2015 which showed mild DJD.     Migraine: He tried imitrex twice but was unable to tolerate this so threw his pills away.     Fibromyalgia: His pain is less well controlled with his decreased morphine dose but it is tolerable. He is scheduled to see pain management in October.     Patient Active Problem List    Diagnosis Date Noted   • Chronic pain of both shoulders 05/31/2017   • Chronic use of opiate for therapeutic purpose 02/13/2017   • Essential hypertension 09/22/2016   • Right knee pain 09/20/2016   • Dyspnea 08/18/2016   • Chest pain 08/18/2016   • Headache, migraine 04/29/2016   • Fibromyalgia 04/29/2016   • Rheumatoid arthritis (CMS-[facility]) 07/07/2009   • GERD (gastroesophageal reflux disease) 07/07/2009   • Other and unspecified hyperlipidemia 07/07/2009       Current Outpatient Prescriptions   Medication Sig Dispense Refill   • [START ON 7/30/2017] oxycodone-acetaminophen (PERCOCET-10)  MG Tab Take 1  "Tab by mouth every 8 hours as needed for Severe Pain. 90 Tab 0   • [START ON 7/30/2017] morphine ER (MS CONTIN) 30 MG Tab CR tablet Take 1 Tab by mouth every 12 hours. 60 Tab 0   • Lidocaine-Menthol 4-5 % Patch 1 Patch by Apply externally route 1 time daily as needed. 60 Patch 2   • acetaminophen/caffeine/butalbital 325-40-50 mg (FIORICET) -40 MG Tab Take 1 Tab by mouth every four hours as needed for Headache. 10 Tab 0   • cyclobenzaprine (FLEXERIL) 10 MG Tab TAKE 1 TABLET BY MOUTH 2 TIMES A DAY AS NEEDED FOR MODERATE PAIN OR MUSCLE SPASMS 60 Tab 0   • ipratropium-albuterol (COMBIVENT RESPIMAT)  MCG/ACT Aero Soln Inhale 2 Puffs by mouth every day. 1 Inhaler 2   • chlorthalidone (HYGROTON) 25 MG Tab Take 1 Tab by mouth every day. 30 Tab 1   • levothyroxine (SYNTHROID) 150 MCG Tab Take 1 Tab by mouth Every morning on an empty stomach. 30 Tab 2   • ranitidine (ZANTAC) 300 MG tablet TAKE 1 TABLET BY MOUTH TWICE DAILY 60 Tab 5   • Certolizumab Pegol (CIMZIA) 2 X 200 MG Kit Inject  as instructed.       No current facility-administered medications for this visit.       ROS: As per HPI. Additional pertinent symptoms as noted below.    All others negative    /94 mmHg  Pulse 88  Temp(Src) 36.2 °C (97.2 °F)  Resp 18  Ht 1.778 m (5' 10\")  Wt 75.206 kg (165 lb 12.8 oz)  BMI 23.79 kg/m2  SpO2 96%    Physical Exam   Constitutional:  oriented to person, place, and time. No distress.   Eyes: EOMI. No scleral icterus.  Cardiovascular: Normal rate, regular rhythm and normal heart sounds.  Exam reveals no gallop and no friction rub.  No murmur heard.  Pulmonary/Chest: Breath sounds normal. Chest wall is not dull to percussion.   Musculoskeletal:   Full ROM of both shoulders which is painful on passive extension. Crepitus present in right shoulder.        Assessment and Plan    1. Chronic use of opiate for therapeutic purpose  Chronic pain recheck:    Last dose of controlled substance: 5/311/2017  Chronic pain " treated with MS Contin 30 mg taken 2 times a day and Percocet 10 mg 3 times a day  Current alcohol or substance use: no    Consequences of Chronic Opiate therapy:  (5 A's)  Analgesia:  not changed  Activity:  not changed  Adverse Events:  none  Aberrant Behaviors: no inappropriate refills requested, lost or stolen meds reported.    Affect/Mood: normal reasoning    Nonnarcotic treatments that are being used: flexeril    Pain management agreement initiated/updated and signed on: Dec 2016  Urine drug screen done: Dec 5, 2016 , which was reviewed today and is consistent with prescribed medications.    I have reviewed the medical records, the Prescription Monitoring Program and I have determined that controlled substance treatment is medically indicated.    2. Essential hypertension  - /94 today, states BP well controlled outside of clinic  - has not started taking chlorthalidone at this time  - encouraged to monitor BP 2-3 times per week for the next month    3. Chronic pain of right knee  - knee arthroplasty in 2013 and ortho doc has retired  - attempts for referral but denied  - pt will contact medicaid and will attempt another ortho referral    4. Fibromyalgia  - pain is not as well controlled with decreased MS contin but is tolerable  - he is scheduled with pain management in October, he will try to prepone this    5. Rheumatoid arthritis, involving unspecified site, unspecified rheumatoid factor presence (CMS-HCC)  - he got his cimzia injection several weeks ago, onset of action may take 6-8 weeks    6. Chronic pain of both shoulders  - chronic likely arthritic changes of both shoulders  - lidocaine patches prescribed, if this does not work, patient advised to call clinic and will try diclofenac gel    7. Migraine without aura and without status migrainosus, not intractable  - patient was unable to tolerate imitrex  - patient provided with fioricet 10 tabs and advised that he should only take this when he  gets a migraine and not for daily headaches. Patient advised he will not be given another refill for this for another 6-8 weeks      Followup: Return in about 3 months (around 8/31/2017) for Long.      Signed by: Itz Hogan M.D.

## 2017-05-31 NOTE — MR AVS SNAPSHOT
"        Christophe Leary   2017 3:00 PM   Office Visit   MRN: [id number]    Department:  [facility] Med - Internal Med   Dept Phone:  [phone]    Description:  Male : 1960   Provider:  Itz Hogan M.D.           Reason for Visit     Chronic Opiate Therapy refill medications      Allergies as of 2017     No Known Allergies      You were diagnosed with     Chronic use of opiate for therapeutic purpose   [9712868]       Essential hypertension   [4566225]       Chronic pain of right knee   [0101983]       Fibromyalgia   [236365]       Rheumatoid arthritis, involving unspecified site, unspecified rheumatoid factor presence (CMS-[facility])   [9295518]       Chronic pain of both shoulders   [579461]       Migraine without aura and without status migrainosus, not intractable   [343992]         Vital Signs     Blood Pressure Pulse Temperature Respirations Height Weight    140/94 mmHg 88 36.2 °C (97.2 °F) 18 1.778 m (5' 10\") 75.206 kg (165 lb 12.8 oz)    Body Mass Index Oxygen Saturation Smoking Status             23.79 kg/m2 96% Never Smoker          Basic Information     Date Of Birth Sex Race Ethnicity Preferred Language    1960 Male White Non- English      Your appointments     Sep 11, 2017  1:15 PM   Established Patient with [facility] / [facility] Med - Internal Medicine (--)    [address] [place] [address]  [address]  [facility] [address] [phone]           You will be receiving a confirmation call a few days before your appointment from our automated call confirmation system.            Oct 09, 2017  2:45 PM   New Patient with [facility] (--)    [address]  [facility] [address] [phone]           Please bring Photo ID, Insurance Cards, All Medication Bottles and copies of any legal documents (such as Living Will, Power of ) If speaking a language besides English please bring an adult . Please " arrive 30 minutes prior for check in and registration. You will be receiving a confirmation call a few days before your appointment from our automated call confirmation system.              Problem List              ICD-10-CM Priority Class Noted - Resolved    Rheumatoid arthritis (CMS-HCC) M06.9   7/7/2009 - Present    GERD (gastroesophageal reflux disease) K21.9   7/7/2009 - Present    Other and unspecified hyperlipidemia E78.5   7/7/2009 - Present    Headache, migraine G43.909   4/29/2016 - Present    Fibromyalgia M79.7   4/29/2016 - Present    Dyspnea R06.00   8/18/2016 - Present    Chest pain R07.9   8/18/2016 - Present    Right knee pain M25.561   9/20/2016 - Present    Essential hypertension I10   9/22/2016 - Present    Chronic use of opiate for therapeutic purpose Z79.891   2/13/2017 - Present    Chronic pain of both shoulders M25.512, G89.29, M25.511   5/31/2017 - Present      Health Maintenance        Date Due Completion Dates    IMM DTaP/Tdap/Td Vaccine (2 - Td) 5/4/2026 5/4/2016, 8/10/2009    COLONOSCOPY 2/9/2027 2/9/2017 (N/S)    Override on 2/9/2017: (N/S) (PER [facility] NO COLONOSCOPY)            Current Immunizations     Influenza Vaccine Quad Inj (Preserved) 11/15/2015 10:25 AM, 12/23/2014    Pneumococcal polysaccharide vaccine (PPSV-23) 11/15/2015 10:25 AM    TD Vaccine 8/10/2009  5:00 PM    Tdap Vaccine 5/4/2016      Below and/or attached are the medications your provider expects you to take. Review all of your home medications and newly ordered medications with your provider and/or pharmacist. Follow medication instructions as directed by your provider and/or pharmacist. Please keep your medication list with you and share with your provider. Update the information when medications are discontinued, doses are changed, or new medications (including over-the-counter products) are added; and carry medication information at all times in the event of emergency situations     Allergies:  No Known Allergies           Medications  Valid as of: May 31, 2017 -  3:48 PM    Generic Name Brand Name Tablet Size Instructions for use    Butalbital-APAP-Caffeine (Tab) FIORICET -40 MG Take 1 Tab by mouth every four hours as needed for Headache.        Certolizumab Pegol (Kit) Certolizumab Pegol 2 X 200 MG Inject  as instructed.        Chlorthalidone (Tab) HYGROTON 25 MG Take 1 Tab by mouth every day.        Cyclobenzaprine HCl (Tab) FLEXERIL 10 MG TAKE 1 TABLET BY MOUTH 2 TIMES A DAY AS NEEDED FOR MODERATE PAIN OR MUSCLE SPASMS        Ipratropium-Albuterol (Aero Soln) COMBIVENT RESPIMAT  MCG/ACT Inhale 2 Puffs by mouth every day.        Levothyroxine Sodium (Tab) SYNTHROID 150 MCG Take 1 Tab by mouth Every morning on an empty stomach.        Lidocaine-Menthol (Patch) Lidocaine-Menthol 4-5 % 1 Patch by Apply externally route 1 time daily as needed.        Morphine Sulfate (Tab CR) MS CONTIN 30 MG Take 1 Tab by mouth every 12 hours.        Oxycodone-Acetaminophen (Tab) PERCOCET-10  MG Take 1 Tab by mouth every 8 hours as needed for Severe Pain.        RaNITidine HCl (Tab) ZANTAC 300 MG TAKE 1 TABLET BY MOUTH TWICE DAILY        .                 Medicines prescribed today were sent to:     [id number] - [place], [place] - [address] [place] [id number]    Phone: [phone] Fax: [phone]    Open 24 Hours?: No      Medication refill instructions:       If your prescription bottle indicates you have medication refills left, it is not necessary to call your provider’s office. Please contact your pharmacy and they will refill your medication.    If your prescription bottle indicates you do not have any refills left, you may request refills at any time through one of the following ways: The online [employer] system (except Urgent Care), by calling your provider’s office, or by asking your pharmacy to contact your provider’s office with a refill request. Medication refills are processed only  during regular business hours and may not be available until the next business day. Your provider may request additional information or to have a follow-up visit with you prior to refilling your medication.   *Please Note: Medication refills are assigned a new Rx number when refilled electronically. Your pharmacy may indicate that no refills were authorized even though a new prescription for the same medication is available at the pharmacy. Please request the medicine by name with the pharmacy before contacting your provider for a refill.        Referral     A referral request has been sent to our patient care coordination department. Please allow 3-5 business days for us to process this request and contact you either by phone or mail. If you do not hear from us by the 5th business day, please call us at [phone].           MyChart Status: Patient Declined

## 2017-06-26 RX ORDER — BUTALBITAL, ACETAMINOPHEN AND CAFFEINE 50; 325; 40 MG/1; MG/1; MG/1
TABLET ORAL
Qty: 10 TAB | Refills: 0 | OUTPATIENT
Start: 2017-06-26

## 2017-06-26 NOTE — TELEPHONE ENCOUNTER
Last seen: 05/31/17 by Dr. Hogan  Next appt: 09/11/17 with Dr. Hogan    Was the patient seen in the last year in this department? Yes   Does patient have an active prescription for medications requested? No   Received Request Via: Pharmacy

## 2017-07-31 NOTE — TELEPHONE ENCOUNTER
Was the patient seen in the last year in this department? Yes  Pt last seen on: 05/31/17 by Dr. Hogan Next appt on: 09/11/17      Does patient have an active prescription for medications requested? No     Received Request Via: Pharmacy

## 2017-08-01 RX ORDER — RANITIDINE 300 MG/1
300 TABLET ORAL 2 TIMES DAILY
Qty: 180 TAB | Refills: 0 | Status: SHIPPED | OUTPATIENT
Start: 2017-08-01 | End: 2017-11-22 | Stop reason: SDUPTHER

## 2017-08-30 ENCOUNTER — OFFICE VISIT (OUTPATIENT)
Dept: INTERNAL MEDICINE | Facility: MEDICAL CENTER | Age: 57
End: 2017-08-30
Payer: MEDICAID

## 2017-08-30 VITALS
HEART RATE: 82 BPM | SYSTOLIC BLOOD PRESSURE: 151 MMHG | HEIGHT: 70 IN | RESPIRATION RATE: 16 BRPM | BODY MASS INDEX: 23.05 KG/M2 | OXYGEN SATURATION: 99 % | TEMPERATURE: 98.2 F | WEIGHT: 161 LBS | DIASTOLIC BLOOD PRESSURE: 97 MMHG

## 2017-08-30 DIAGNOSIS — M79.7 FIBROMYALGIA: ICD-10-CM

## 2017-08-30 DIAGNOSIS — Z79.891 CHRONIC USE OF OPIATE FOR THERAPEUTIC PURPOSE: ICD-10-CM

## 2017-08-30 DIAGNOSIS — M06.9 RHEUMATOID ARTHRITIS, INVOLVING UNSPECIFIED SITE, UNSPECIFIED RHEUMATOID FACTOR PRESENCE: ICD-10-CM

## 2017-08-30 DIAGNOSIS — R10.9 RIGHT FLANK PAIN: ICD-10-CM

## 2017-08-30 LAB
APPEARANCE UR: CLEAR
BILIRUB UR STRIP-MCNC: NORMAL MG/DL
COLOR UR AUTO: YELLOW
GLUCOSE UR STRIP.AUTO-MCNC: NORMAL MG/DL
KETONES UR STRIP.AUTO-MCNC: NORMAL MG/DL
LEUKOCYTE ESTERASE UR QL STRIP.AUTO: NORMAL
NITRITE UR QL STRIP.AUTO: NORMAL
PH UR STRIP.AUTO: 6.5 [PH] (ref 5–8)
PROT UR QL STRIP: NORMAL MG/DL
RBC UR QL AUTO: NORMAL
SP GR UR STRIP.AUTO: 1.01
UROBILINOGEN UR STRIP-MCNC: NORMAL MG/DL

## 2017-08-30 PROCEDURE — 99214 OFFICE O/P EST MOD 30 MIN: CPT | Mod: 25,GC | Performed by: INTERNAL MEDICINE

## 2017-08-30 PROCEDURE — 81002 URINALYSIS NONAUTO W/O SCOPE: CPT | Performed by: INTERNAL MEDICINE

## 2017-08-30 RX ORDER — MORPHINE SULFATE 30 MG/1
30 TABLET, FILM COATED, EXTENDED RELEASE ORAL EVERY 12 HOURS
Qty: 60 TAB | Refills: 0 | Status: SHIPPED | OUTPATIENT
Start: 2017-08-30 | End: 2017-09-11 | Stop reason: SDUPTHER

## 2017-08-30 RX ORDER — OXYCODONE AND ACETAMINOPHEN 10; 325 MG/1; MG/1
1 TABLET ORAL EVERY 8 HOURS PRN
Qty: 90 TAB | Refills: 0 | Status: SHIPPED | OUTPATIENT
Start: 2017-08-30 | End: 2017-09-11 | Stop reason: SDUPTHER

## 2017-08-30 ASSESSMENT — ENCOUNTER SYMPTOMS
DOUBLE VISION: 0
ORTHOPNEA: 0
VOMITING: 0
BLOOD IN STOOL: 0
HEARTBURN: 0
BLURRED VISION: 0
TINGLING: 0
SHORTNESS OF BREATH: 0
ABDOMINAL PAIN: 0
INSOMNIA: 1
WEIGHT LOSS: 0
FOCAL WEAKNESS: 0
DIARRHEA: 0
DIZZINESS: 0
DEPRESSION: 0
HEADACHES: 0
NAUSEA: 0
FLANK PAIN: 1
CHILLS: 0
LOSS OF CONSCIOUSNESS: 0
PALPITATIONS: 0
COUGH: 0
FEVER: 0
CONSTIPATION: 0

## 2017-08-30 ASSESSMENT — LIFESTYLE VARIABLES: SUBSTANCE_ABUSE: 0

## 2017-08-30 NOTE — PROGRESS NOTES
Established Patient    Christophe presents today with the following:    CC: Pain medication refill and right flank pain    HPI: Mr. Taylor is a pleasant 57-year-old male with past medical history of gastroesophageal reflux disease, hyperlipidemia, migraine, hypertension, hypothyroidism, right total knee arthroplasty, right shoulder surgery, left forearm surgery, fibromyalgia and rheumatoid arthritis. He is here in the clinic today to Get refill of his pain medication and because of right flank pain, is a flank pain. 2 days ago, on and off, 7/10 in intensity when its in peak and 1-2/10 when its low, feels like a cramp, does not radiate, no aggravating or alleviating factors, patient denies fever, sweating, dysuria, frequency and urgency.  Chronic pain recheck:   Last dose of controlled substance: today  Chronic pain treated with Norco taken 3 times a day  Current alcohol or substance use: no     Consequences of Chronic Opiate therapy:  (5 A's)  Analgesia:  not changed  Activity:  not changed  Adverse Events:  none  Aberrant Behaviors: no inappropriate refills requested, lost or stolen meds reported.   Affect/Mood: Normal full facial expressions     Nonnarcotic treatments that are being used: Flexeril   I have reviewed the medical records, the Prescription Monitoring Program and I have determined that controlled substance treatment is medically indicated    Patient Active Problem List    Diagnosis Date Noted   • Chronic pain of both shoulders 05/31/2017   • Chronic use of opiate for therapeutic purpose 02/13/2017   • Essential hypertension 09/22/2016   • Right knee pain 09/20/2016   • Dyspnea 08/18/2016   • Chest pain 08/18/2016   • Headache, migraine 04/29/2016   • Fibromyalgia 04/29/2016   • Rheumatoid arthritis (CMS-[facility]) 07/07/2009   • GERD (gastroesophageal reflux disease) 07/07/2009   • Other and unspecified hyperlipidemia 07/07/2009       Current Outpatient Prescriptions   Medication Sig Dispense Refill   •  oxycodone-acetaminophen (PERCOCET-10)  MG Tab Take 1 Tab by mouth every 8 hours as needed for Severe Pain. 90 Tab 0   • morphine ER (MS CONTIN) 30 MG Tab CR tablet Take 1 Tab by mouth every 12 hours. 60 Tab 0   • levothyroxine (SYNTHROID) 150 MCG Tab TAKE ONE TABLET BY MOUTH EVERY MORNING ON EMPTY STOMACH 60 Tab 2   • cyclobenzaprine (FLEXERIL) 10 MG Tab TAKE 1 TABLET BY MOUTH 2 TIMES A DAY AS NEEDED FOR MODERATE PAIN OR MUSCLE SPASMS 60 Tab 1   • ranitidine (ZANTAC) 300 MG tablet Take 1 Tab by mouth 2 Times a Day. 180 Tab 0   • acetaminophen/caffeine/butalbital 325-40-50 mg (FIORICET) -40 MG Tab Take 1 Tab by mouth every four hours as needed for Headache. 10 Tab 0   • ipratropium-albuterol (COMBIVENT RESPIMAT)  MCG/ACT Aero Soln Inhale 2 Puffs by mouth every day. 1 Inhaler 2   • Certolizumab Pegol (CIMZIA) 2 X 200 MG Kit Inject  as instructed.     • Lidocaine-Menthol 4-5 % Patch 1 Patch by Apply externally route 1 time daily as needed. 60 Patch 2   • chlorthalidone (HYGROTON) 25 MG Tab Take 1 Tab by mouth every day. 30 Tab 1     No current facility-administered medications for this visit.        Social History     Social History   • Marital status: Single     Spouse name: N/A   • Number of children: N/A   • Years of education: N/A     Occupational History   • Not on file.     Social History Main Topics   • Smoking status: Never Smoker   • Smokeless tobacco: Never Used   • Alcohol use 0.6 oz/week     1 Cans of beer per week      Comment: rarely   • Drug use: No      Comment: METH AMPHETAMINE 20 YRS AGO   • Sexual activity: Yes     Partners: Female     Other Topics Concern   • Not on file     Social History Narrative   • No narrative on file       Family History   Problem Relation Age of Onset   • Heart Disease Father        ROS: As per HPI. Additional pertinent symptoms as noted below.  Review of Systems   Constitutional: Negative for chills, fever, malaise/fatigue and weight loss.   Eyes: Negative  "for blurred vision and double vision.   Respiratory: Negative for cough and shortness of breath.    Cardiovascular: Negative for chest pain, palpitations and orthopnea.   Gastrointestinal: Negative for abdominal pain, blood in stool, constipation, diarrhea, heartburn, nausea and vomiting.   Genitourinary: Positive for flank pain (Rt side). Negative for dysuria, frequency, hematuria and urgency.   Neurological: Negative for dizziness, tingling, focal weakness, loss of consciousness and headaches.   Psychiatric/Behavioral: Negative for depression and substance abuse. The patient has insomnia (because of pain ).            /97   Pulse 82   Temp 36.8 °C (98.2 °F)   Resp 16   Ht 1.778 m (5' 10\")   Wt 73 kg (161 lb)   SpO2 99%   BMI 23.10 kg/m²     Physical Exam   Constitutional: He is oriented to person, place, and time and well-developed, well-nourished, and in no distress.   HENT:   Head: Normocephalic and atraumatic.   Eyes: Pupils are equal, round, and reactive to light.   Neck: Normal range of motion. Neck supple.   Cardiovascular: Normal rate, regular rhythm and normal heart sounds.  Exam reveals no gallop and no friction rub.    No murmur heard.  Pulmonary/Chest: Effort normal and breath sounds normal. No respiratory distress. He has no wheezes. He has no rales.   Abdominal: Soft. He exhibits no distension. There is no hepatosplenomegaly. There is tenderness (very mild mostly on the Rt flank ). There is no rigidity, no guarding, no CVA tenderness and no tenderness at McBurney's point.       Musculoskeletal:        Right shoulder: He exhibits decreased range of motion (limitted abduction ).   Lymphadenopathy:     He has no cervical adenopathy.   Neurological: He is alert and oriented to person, place, and time. He has normal strength.   Skin:        Well healed scar of a previous surgery      Vitals reviewed.         Assessment and Plan    1. Chronic use of opiate for therapeutic purpose  History of " chronic pain due to multiple surgeries, rheumatoid arthritis and fibromyalgia, patient on page about 8-9 years, following up with his rheumatologist Dr. Gomez, his  was reviewed.  Plan:   - Refill the Norco and Morphine   - Educate the patient to do physical therapy and consider non opiates pain meds     2. Right flank pain  History of pain in the right ankle 2 days, on and off, 7/10 in intensity when its in peak and 1-2/10 when its low, feels like a cramp, does not radiate, no aggravating or alleviating factors, patient denies fever, sweating, dysuria, frequency and urgency. UA was done in the clinic and was normal.  Plan:   - Most likely fits musculoskeletal pain, we'll monitor the patient, advised to come to the clinic if pain not resolve or get worse or if there is any new symptoms.              Signed by: Samy Velásquez M.D.

## 2017-09-11 ENCOUNTER — OFFICE VISIT (OUTPATIENT)
Dept: INTERNAL MEDICINE | Facility: MEDICAL CENTER | Age: 57
End: 2017-09-11
Payer: MEDICAID

## 2017-09-11 VITALS
RESPIRATION RATE: 16 BRPM | BODY MASS INDEX: 23.19 KG/M2 | DIASTOLIC BLOOD PRESSURE: 98 MMHG | HEIGHT: 70 IN | HEART RATE: 86 BPM | WEIGHT: 162 LBS | TEMPERATURE: 97.1 F | SYSTOLIC BLOOD PRESSURE: 146 MMHG | OXYGEN SATURATION: 95 %

## 2017-09-11 DIAGNOSIS — G43.009 MIGRAINE WITHOUT AURA AND WITHOUT STATUS MIGRAINOSUS, NOT INTRACTABLE: ICD-10-CM

## 2017-09-11 DIAGNOSIS — M06.9 RHEUMATOID ARTHRITIS, INVOLVING UNSPECIFIED SITE, UNSPECIFIED RHEUMATOID FACTOR PRESENCE: ICD-10-CM

## 2017-09-11 DIAGNOSIS — Z79.891 CHRONIC USE OF OPIATE FOR THERAPEUTIC PURPOSE: ICD-10-CM

## 2017-09-11 DIAGNOSIS — G89.29 CHRONIC PAIN OF RIGHT KNEE: ICD-10-CM

## 2017-09-11 DIAGNOSIS — M25.512 CHRONIC PAIN OF BOTH SHOULDERS: ICD-10-CM

## 2017-09-11 DIAGNOSIS — M79.7 FIBROMYALGIA: ICD-10-CM

## 2017-09-11 DIAGNOSIS — K21.9 GASTROESOPHAGEAL REFLUX DISEASE, ESOPHAGITIS PRESENCE NOT SPECIFIED: ICD-10-CM

## 2017-09-11 DIAGNOSIS — R25.2 MUSCLE CRAMP: ICD-10-CM

## 2017-09-11 DIAGNOSIS — G89.29 CHRONIC PAIN OF BOTH SHOULDERS: ICD-10-CM

## 2017-09-11 DIAGNOSIS — E03.9 ACQUIRED HYPOTHYROIDISM: ICD-10-CM

## 2017-09-11 DIAGNOSIS — M25.511 CHRONIC PAIN OF BOTH SHOULDERS: ICD-10-CM

## 2017-09-11 DIAGNOSIS — M25.561 CHRONIC PAIN OF RIGHT KNEE: ICD-10-CM

## 2017-09-11 PROCEDURE — 99214 OFFICE O/P EST MOD 30 MIN: CPT | Mod: GC | Performed by: INTERNAL MEDICINE

## 2017-09-11 RX ORDER — LIDOCAINE 4 G/G
1 PATCH TOPICAL
Qty: 30 PATCH | Refills: 1 | Status: SHIPPED | OUTPATIENT
Start: 2017-09-11 | End: 2017-09-14

## 2017-09-11 RX ORDER — OXYCODONE AND ACETAMINOPHEN 10; 325 MG/1; MG/1
1 TABLET ORAL EVERY 8 HOURS PRN
Qty: 90 TAB | Refills: 0 | Status: SHIPPED | OUTPATIENT
Start: 2017-10-30 | End: 2017-11-22 | Stop reason: SDUPTHER

## 2017-09-11 RX ORDER — MORPHINE SULFATE 30 MG/1
30 TABLET, FILM COATED, EXTENDED RELEASE ORAL EVERY 12 HOURS
Qty: 60 TAB | Refills: 0 | Status: SHIPPED | OUTPATIENT
Start: 2017-09-30 | End: 2017-09-11 | Stop reason: SDUPTHER

## 2017-09-11 RX ORDER — MORPHINE SULFATE 30 MG/1
30 TABLET, FILM COATED, EXTENDED RELEASE ORAL EVERY 12 HOURS
Qty: 60 TAB | Refills: 0 | Status: SHIPPED | OUTPATIENT
Start: 2017-10-30 | End: 2017-11-22 | Stop reason: SDUPTHER

## 2017-09-11 RX ORDER — BUTALBITAL, ACETAMINOPHEN AND CAFFEINE 50; 325; 40 MG/1; MG/1; MG/1
1 TABLET ORAL EVERY 4 HOURS PRN
Qty: 10 TAB | Refills: 0 | Status: SHIPPED | OUTPATIENT
Start: 2017-09-11 | End: 2017-11-02

## 2017-09-11 RX ORDER — OXYCODONE AND ACETAMINOPHEN 10; 325 MG/1; MG/1
1 TABLET ORAL EVERY 8 HOURS PRN
Qty: 90 TAB | Refills: 0 | Status: SHIPPED | OUTPATIENT
Start: 2017-09-30 | End: 2017-09-11 | Stop reason: SDUPTHER

## 2017-09-11 ASSESSMENT — PAIN SCALES - GENERAL: PAINLEVEL: 6=MODERATE PAIN

## 2017-09-11 NOTE — PROGRESS NOTES
Established Patient    Christophe presents today with the following:    CC: Shoulder pain, knee pain, medication refill    HPI:     57-year-old male here for follow-up:    Shoulder pain: Right greater than left. This has an present for multiple years. He had steroid injections done last month by his rheumatologist. He states this only provided minimal relief for a short period of time. He had an x-ray in 2015 which is just shown some arthritic changes. He denies any numbness, tingling, muscle weakness of his upper extremities. He feels stiffness mostly in his right shoulder and has limited range of motion due to this.    Right knee pain: Hit a right arthroplasty in 2013 by Dr. Hodgson. Multiple ortho referrals have been placed for him but these have been declined.    Hypothyroidism: He has been taking his Synthroid 150 µg. He has not had blood work done for this in some time.    GERD: He has been on ranitidine for a number of years. He states his heartburn symptoms return when he stopped taking his medication. He cannot recall ever having been checked for H. Pylori.    Migraine: He states the frequency of his headaches have reduced. The only medications that controls his headaches are either aspirin or Fioricet.    Right flank muscle pain: This is been present for about a month. He states if he thinks that he may have sprained it at work. He has not taken any anti-inflammatory or done any measure to help improve this. He says the pain has been the same. He can recall 2 episodes during which he had a sharp stabbing pain which lasted several minutes and then resolved on its own.    Preventive health: He is not interested in getting a flu vaccination.    Patient Active Problem List    Diagnosis Date Noted   • Chronic pain of both shoulders 05/31/2017   • Chronic use of opiate for therapeutic purpose 02/13/2017   • Essential hypertension 09/22/2016   • Right knee pain 09/20/2016   • Dyspnea 08/18/2016   • Chest pain  "08/18/2016   • Headache, migraine 04/29/2016   • Fibromyalgia 04/29/2016   • Acquired hypothyroidism 11/12/2015   • Rheumatoid arthritis (CMS-[facility]) 07/07/2009   • GERD (gastroesophageal reflux disease) 07/07/2009   • Other and unspecified hyperlipidemia 07/07/2009       Current Outpatient Prescriptions   Medication Sig Dispense Refill   • Lidocaine 4 % Patch 1 Patch by Apply externally route 1 time daily as needed. 30 Patch 1   • acetaminophen/caffeine/butalbital 325-40-50 mg (FIORICET) -40 MG Tab Take 1 Tab by mouth every four hours as needed for Headache. 10 Tab 0   • [START ON 10/30/2017] morphine ER (MS CONTIN) 30 MG Tab CR tablet Take 1 Tab by mouth every 12 hours. 60 Tab 0   • [START ON 10/30/2017] oxycodone-acetaminophen (PERCOCET-10)  MG Tab Take 1 Tab by mouth every 8 hours as needed for Severe Pain. 90 Tab 0   • levothyroxine (SYNTHROID) 150 MCG Tab TAKE ONE TABLET BY MOUTH EVERY MORNING ON EMPTY STOMACH 60 Tab 2   • cyclobenzaprine (FLEXERIL) 10 MG Tab TAKE 1 TABLET BY MOUTH 2 TIMES A DAY AS NEEDED FOR MODERATE PAIN OR MUSCLE SPASMS 60 Tab 1   • ranitidine (ZANTAC) 300 MG tablet Take 1 Tab by mouth 2 Times a Day. 180 Tab 0   • ipratropium-albuterol (COMBIVENT RESPIMAT)  MCG/ACT Aero Soln Inhale 2 Puffs by mouth every day. 1 Inhaler 2   • chlorthalidone (HYGROTON) 25 MG Tab Take 1 Tab by mouth every day. 30 Tab 1   • Certolizumab Pegol (CIMZIA) 2 X 200 MG Kit Inject  as instructed.       No current facility-administered medications for this visit.        ROS: As per HPI. Additional pertinent symptoms as noted below.    All others negative    /98   Pulse 86   Temp 36.2 °C (97.1 °F)   Resp 16   Ht 1.778 m (5' 10\")   Wt 73.5 kg (162 lb)   SpO2 95%   BMI 23.24 kg/m²     Physical Exam   Constitutional:  oriented to person, place, and time. No distress.   Cardiovascular: Normal rate, regular rhythm and normal heart sounds.  Exam reveals no gallop and no friction rub.  No murmur " heard.  Pulmonary/Chest: Breath sounds normal. Chest wall is not dull to percussion.   Musculoskeletal: Range of motion of right shoulder limited to about 100°    Note: I have reviewed all pertinent labs and diagnostic tests associated with this visit with specific comments listed under the assessment and plan below    Assessment and Plan    1. Chronic pain of both shoulders  - Right shoulder more painful than left  - Steroid injections done last month with minimal relief  - Physiatry consult placed  - Vitamin D level ordered    2. Migraine without aura and without status migrainosus, not intractable  - Patient given Fioricet 10 tablets to last him at least one month    3. Acquired hypothyroidism  - TSH with reflex to free T4 ordered    4. Gastroesophageal reflux disease, esophagitis presence not specified  - H pylori IgG antibodies ordered    5. Chronic pain of right knee  - Ortho referral placed again to be sent to a group other than [facility] orthopedics as he has been declined by them  - Physiatry referral placed    6. Muscle cramp  - Chem panel and magnesium levels ordered    7. Fibromyalgia  - Patient given 2 month refill of his narcotics    8. Chronic use of opiate for therapeutic purpose  Chronic pain recheck:   Last dose of controlled substance: 9/11/17  Chronic pain treated with morphine 30 mg twice a day and Percocet 10 mg 3 times a day   Current alcohol or substance use: no    Consequences of Chronic Opiate therapy:  (5 A's)  Analgesia:  not changed  Activity:  not changed  Adverse Events:  no  Aberrant Behaviors: no inappropriate refills requested, lost or stolen meds reported.   Affect/Mood: normal thought patterns, good insight, normal reasoning    Nonnarcotic treatments that are being used: Flexeril and lidocaine patch    Pain management agreement initiated/updated and signed on: Dec 2016  Urine drug screen done: Dec 5, 2016 , which was reviewed today and is consistent with prescribed medications.     I  have reviewed the medical records, the Prescription Monitoring Program and I have determined that controlled substance treatment is medically indicated.      Followup: Return in about 3 months (around 12/11/2017) for f/u labs on, pain med refill, Long.      Signed by: Itz Hogan M.D.

## 2017-09-14 ENCOUNTER — TELEPHONE (OUTPATIENT)
Dept: INTERNAL MEDICINE | Facility: MEDICAL CENTER | Age: 57
End: 2017-09-14

## 2017-09-14 RX ORDER — LIDOCAINE 50 MG/G
1 PATCH TOPICAL EVERY 24 HOURS
Qty: 10 PATCH | Refills: 3 | Status: SHIPPED | OUTPATIENT
Start: 2017-09-14 | End: 2017-09-25

## 2017-09-15 ENCOUNTER — TELEPHONE (OUTPATIENT)
Dept: INTERNAL MEDICINE | Facility: MEDICAL CENTER | Age: 57
End: 2017-09-15

## 2017-09-15 NOTE — TELEPHONE ENCOUNTER
Lidocaine patch is not covered on insurance, please change to Gabapentin, Elavil, Nortriptyline or Rimipramine

## 2017-09-19 NOTE — TELEPHONE ENCOUNTER
Dr. Hogan asked me to contact pt and see if he's ever tried any of these. Left message for pt. to call back.

## 2017-09-25 RX ORDER — NORTRIPTYLINE HYDROCHLORIDE 25 MG/1
25 CAPSULE ORAL EVERY EVENING
Qty: 30 CAP | Refills: 1 | Status: SHIPPED | OUTPATIENT
Start: 2017-09-25 | End: 2017-11-02

## 2017-09-25 NOTE — TELEPHONE ENCOUNTER
Lidocaine not approved by insurance. Will give trial of nortriptyline to see if this helps with patient's pain. Please call patient to pick medication from pharmacy.

## 2017-11-01 ENCOUNTER — HOSPITAL ENCOUNTER (INPATIENT)
Facility: MEDICAL CENTER | Age: 57
LOS: 1 days | DRG: 196 | End: 2017-11-02
Attending: EMERGENCY MEDICINE | Admitting: INTERNAL MEDICINE
Payer: MEDICAID

## 2017-11-01 ENCOUNTER — APPOINTMENT (OUTPATIENT)
Dept: RADIOLOGY | Facility: MEDICAL CENTER | Age: 57
DRG: 196 | End: 2017-11-01
Attending: EMERGENCY MEDICINE
Payer: MEDICAID

## 2017-11-01 DIAGNOSIS — J45.51 SEVERE PERSISTENT REACTIVE AIRWAY DISEASE WITH ACUTE EXACERBATION: ICD-10-CM

## 2017-11-01 LAB
ALBUMIN SERPL BCP-MCNC: 4.5 G/DL (ref 3.2–4.9)
ALBUMIN/GLOB SERPL: 1.3 G/DL
ALP SERPL-CCNC: 67 U/L (ref 30–99)
ALT SERPL-CCNC: 14 U/L (ref 2–50)
ANION GAP SERPL CALC-SCNC: 9 MMOL/L (ref 0–11.9)
ANISOCYTOSIS BLD QL SMEAR: ABNORMAL
AST SERPL-CCNC: 18 U/L (ref 12–45)
BASOPHILS # BLD AUTO: 0 % (ref 0–1.8)
BASOPHILS # BLD: 0 K/UL (ref 0–0.12)
BILIRUB SERPL-MCNC: 0.3 MG/DL (ref 0.1–1.5)
BUN SERPL-MCNC: 22 MG/DL (ref 8–22)
CALCIUM SERPL-MCNC: 10 MG/DL (ref 8.5–10.5)
CHLORIDE SERPL-SCNC: 102 MMOL/L (ref 96–112)
CO2 SERPL-SCNC: 26 MMOL/L (ref 20–33)
CREAT SERPL-MCNC: 1.02 MG/DL (ref 0.5–1.4)
EKG IMPRESSION: NORMAL
EOSINOPHIL # BLD AUTO: 1.7 K/UL (ref 0–0.51)
EOSINOPHIL NFR BLD: 16.8 % (ref 0–6.9)
ERYTHROCYTE [DISTWIDTH] IN BLOOD BY AUTOMATED COUNT: 42.5 FL (ref 35.9–50)
GFR SERPL CREATININE-BSD FRML MDRD: >60 ML/MIN/1.73 M 2
GLOBULIN SER CALC-MCNC: 3.4 G/DL (ref 1.9–3.5)
GLUCOSE SERPL-MCNC: 87 MG/DL (ref 65–99)
HCT VFR BLD AUTO: 44.8 % (ref 42–52)
HGB BLD-MCNC: 14.8 G/DL (ref 14–18)
LYMPHOCYTES # BLD AUTO: 2.32 K/UL (ref 1–4.8)
LYMPHOCYTES NFR BLD: 23 % (ref 22–41)
MANUAL DIFF BLD: NORMAL
MCH RBC QN AUTO: 28.2 PG (ref 27–33)
MCHC RBC AUTO-ENTMCNC: 33 G/DL (ref 33.7–35.3)
MCV RBC AUTO: 85.3 FL (ref 81.4–97.8)
MICROCYTES BLD QL SMEAR: ABNORMAL
MONOCYTES # BLD AUTO: 0.36 K/UL (ref 0–0.85)
MONOCYTES NFR BLD AUTO: 3.6 % (ref 0–13.4)
MORPHOLOGY BLD-IMP: NORMAL
NEUTROPHILS # BLD AUTO: 5.72 K/UL (ref 1.82–7.42)
NEUTROPHILS NFR BLD: 56.6 % (ref 44–72)
NRBC # BLD AUTO: 0 K/UL
NRBC BLD AUTO-RTO: 0 /100 WBC
PLATELET # BLD AUTO: 313 K/UL (ref 164–446)
PLATELET BLD QL SMEAR: NORMAL
PMV BLD AUTO: 9.1 FL (ref 9–12.9)
POTASSIUM SERPL-SCNC: 4.5 MMOL/L (ref 3.6–5.5)
PROT SERPL-MCNC: 7.9 G/DL (ref 6–8.2)
RBC # BLD AUTO: 5.25 M/UL (ref 4.7–6.1)
RBC BLD AUTO: PRESENT
SMUDGE CELLS BLD QL SMEAR: NORMAL
SODIUM SERPL-SCNC: 137 MMOL/L (ref 135–145)
WBC # BLD AUTO: 10.1 K/UL (ref 4.8–10.8)

## 2017-11-01 PROCEDURE — 36415 COLL VENOUS BLD VENIPUNCTURE: CPT

## 2017-11-01 PROCEDURE — 700111 HCHG RX REV CODE 636 W/ 250 OVERRIDE (IP): Performed by: EMERGENCY MEDICINE

## 2017-11-01 PROCEDURE — 304562 HCHG STAT O2 MASK/CANNULA

## 2017-11-01 PROCEDURE — 80053 COMPREHEN METABOLIC PANEL: CPT

## 2017-11-01 PROCEDURE — 71010 DX-CHEST-LIMITED (1 VIEW): CPT

## 2017-11-01 PROCEDURE — 93005 ELECTROCARDIOGRAM TRACING: CPT | Performed by: EMERGENCY MEDICINE

## 2017-11-01 PROCEDURE — 83735 ASSAY OF MAGNESIUM: CPT

## 2017-11-01 PROCEDURE — 85007 BL SMEAR W/DIFF WBC COUNT: CPT

## 2017-11-01 PROCEDURE — 94640 AIRWAY INHALATION TREATMENT: CPT

## 2017-11-01 PROCEDURE — 94760 N-INVAS EAR/PLS OXIMETRY 1: CPT

## 2017-11-01 PROCEDURE — 93005 ELECTROCARDIOGRAM TRACING: CPT

## 2017-11-01 PROCEDURE — 99285 EMERGENCY DEPT VISIT HI MDM: CPT

## 2017-11-01 PROCEDURE — 85027 COMPLETE CBC AUTOMATED: CPT

## 2017-11-01 PROCEDURE — 83880 ASSAY OF NATRIURETIC PEPTIDE: CPT

## 2017-11-01 PROCEDURE — 84443 ASSAY THYROID STIM HORMONE: CPT

## 2017-11-01 PROCEDURE — 700101 HCHG RX REV CODE 250: Performed by: EMERGENCY MEDICINE

## 2017-11-01 PROCEDURE — 84484 ASSAY OF TROPONIN QUANT: CPT

## 2017-11-01 RX ORDER — PREDNISONE 20 MG/1
60 TABLET ORAL ONCE
Status: COMPLETED | OUTPATIENT
Start: 2017-11-01 | End: 2017-11-01

## 2017-11-01 RX ADMIN — ALBUTEROL SULFATE 2.5 MG: 2.5 SOLUTION RESPIRATORY (INHALATION) at 23:13

## 2017-11-01 RX ADMIN — PREDNISONE 60 MG: 20 TABLET ORAL at 22:50

## 2017-11-01 ASSESSMENT — PAIN SCALES - GENERAL: PAINLEVEL_OUTOF10: 7

## 2017-11-02 ENCOUNTER — APPOINTMENT (OUTPATIENT)
Dept: RADIOLOGY | Facility: MEDICAL CENTER | Age: 57
DRG: 196 | End: 2017-11-02
Attending: HOSPITALIST
Payer: MEDICAID

## 2017-11-02 ENCOUNTER — RESOLUTE PROFESSIONAL BILLING HOSPITAL PROF FEE (OUTPATIENT)
Dept: HOSPITALIST | Facility: MEDICAL CENTER | Age: 57
End: 2017-11-02
Payer: MEDICAID

## 2017-11-02 VITALS
RESPIRATION RATE: 18 BRPM | HEART RATE: 79 BPM | OXYGEN SATURATION: 90 % | WEIGHT: 152.34 LBS | TEMPERATURE: 98 F | HEIGHT: 70 IN | DIASTOLIC BLOOD PRESSURE: 88 MMHG | SYSTOLIC BLOOD PRESSURE: 137 MMHG | BODY MASS INDEX: 21.81 KG/M2

## 2017-11-02 PROBLEM — J45.909 REACTIVE AIRWAY DISEASE: Status: ACTIVE | Noted: 2017-11-02

## 2017-11-02 LAB
BASE EXCESS BLDA CALC-SCNC: -1 MMOL/L (ref -4–3)
BNP SERPL-MCNC: 4 PG/ML (ref 0–100)
BODY TEMPERATURE: ABNORMAL CENTIGRADE
HCO3 BLDA-SCNC: 23 MMOL/L (ref 17–25)
MAGNESIUM SERPL-MCNC: 2.2 MG/DL (ref 1.5–2.5)
PCO2 BLDA: 37.3 MMHG (ref 26–37)
PH BLDA: 7.41 [PH] (ref 7.4–7.5)
PO2 BLDA: 87.1 MMHG (ref 64–87)
SAO2 % BLDA: 96 % (ref 93–99)
TROPONIN I SERPL-MCNC: <0.01 NG/ML (ref 0–0.04)
TSH SERPL DL<=0.005 MIU/L-ACNC: 24.27 UIU/ML (ref 0.3–3.7)

## 2017-11-02 PROCEDURE — 700102 HCHG RX REV CODE 250 W/ 637 OVERRIDE(OP): Performed by: STUDENT IN AN ORGANIZED HEALTH CARE EDUCATION/TRAINING PROGRAM

## 2017-11-02 PROCEDURE — A9270 NON-COVERED ITEM OR SERVICE: HCPCS | Performed by: STUDENT IN AN ORGANIZED HEALTH CARE EDUCATION/TRAINING PROGRAM

## 2017-11-02 PROCEDURE — 94760 N-INVAS EAR/PLS OXIMETRY 1: CPT

## 2017-11-02 PROCEDURE — 86606 ASPERGILLUS ANTIBODY: CPT

## 2017-11-02 PROCEDURE — 770006 HCHG ROOM/CARE - MED/SURG/GYN SEMI*

## 2017-11-02 PROCEDURE — 94640 AIRWAY INHALATION TREATMENT: CPT

## 2017-11-02 PROCEDURE — 82785 ASSAY OF IGE: CPT

## 2017-11-02 PROCEDURE — 700101 HCHG RX REV CODE 250: Performed by: STUDENT IN AN ORGANIZED HEALTH CARE EDUCATION/TRAINING PROGRAM

## 2017-11-02 PROCEDURE — 71250 CT THORAX DX C-: CPT

## 2017-11-02 PROCEDURE — 82803 BLOOD GASES ANY COMBINATION: CPT

## 2017-11-02 PROCEDURE — 36415 COLL VENOUS BLD VENIPUNCTURE: CPT

## 2017-11-02 PROCEDURE — 700111 HCHG RX REV CODE 636 W/ 250 OVERRIDE (IP): Performed by: STUDENT IN AN ORGANIZED HEALTH CARE EDUCATION/TRAINING PROGRAM

## 2017-11-02 PROCEDURE — 99236 HOSP IP/OBS SAME DATE HI 85: CPT | Mod: GC | Performed by: INTERNAL MEDICINE

## 2017-11-02 RX ORDER — LORATADINE 10 MG/1
10 TABLET ORAL DAILY
Status: DISCONTINUED | OUTPATIENT
Start: 2017-11-02 | End: 2017-11-02 | Stop reason: HOSPADM

## 2017-11-02 RX ORDER — BISACODYL 10 MG
10 SUPPOSITORY, RECTAL RECTAL
Status: DISCONTINUED | OUTPATIENT
Start: 2017-11-02 | End: 2017-11-02 | Stop reason: HOSPADM

## 2017-11-02 RX ORDER — CYCLOBENZAPRINE HCL 10 MG
10 TABLET ORAL 2 TIMES DAILY PRN
Status: DISCONTINUED | OUTPATIENT
Start: 2017-11-02 | End: 2017-11-02 | Stop reason: HOSPADM

## 2017-11-02 RX ORDER — MORPHINE SULFATE 15 MG/1
30 TABLET, FILM COATED, EXTENDED RELEASE ORAL EVERY 12 HOURS
Status: DISCONTINUED | OUTPATIENT
Start: 2017-11-02 | End: 2017-11-02 | Stop reason: HOSPADM

## 2017-11-02 RX ORDER — LABETALOL HYDROCHLORIDE 5 MG/ML
10 INJECTION, SOLUTION INTRAVENOUS EVERY 4 HOURS PRN
Status: DISCONTINUED | OUTPATIENT
Start: 2017-11-02 | End: 2017-11-02 | Stop reason: HOSPADM

## 2017-11-02 RX ORDER — FLUTICASONE PROPIONATE 50 MCG
1 SPRAY, SUSPENSION (ML) NASAL DAILY
Status: DISCONTINUED | OUTPATIENT
Start: 2017-11-02 | End: 2017-11-02 | Stop reason: HOSPADM

## 2017-11-02 RX ORDER — LEVOTHYROXINE SODIUM 0.15 MG/1
150 TABLET ORAL
Status: DISCONTINUED | OUTPATIENT
Start: 2017-11-02 | End: 2017-11-02 | Stop reason: HOSPADM

## 2017-11-02 RX ORDER — LORATADINE 10 MG/1
10 TABLET ORAL DAILY
Qty: 30 TAB | Refills: 1 | Status: SHIPPED | OUTPATIENT
Start: 2017-11-02 | End: 2018-11-05

## 2017-11-02 RX ORDER — PREDNISONE 20 MG/1
40 TABLET ORAL DAILY
Status: DISCONTINUED | OUTPATIENT
Start: 2017-11-02 | End: 2017-11-02 | Stop reason: HOSPADM

## 2017-11-02 RX ORDER — IPRATROPIUM BROMIDE AND ALBUTEROL SULFATE 2.5; .5 MG/3ML; MG/3ML
3 SOLUTION RESPIRATORY (INHALATION)
Status: DISCONTINUED | OUTPATIENT
Start: 2017-11-02 | End: 2017-11-02 | Stop reason: HOSPADM

## 2017-11-02 RX ORDER — ALBUTEROL SULFATE 90 UG/1
2 AEROSOL, METERED RESPIRATORY (INHALATION) EVERY 6 HOURS PRN
Qty: 8.5 G | Refills: 3 | Status: SHIPPED | OUTPATIENT
Start: 2017-11-02 | End: 2018-01-03 | Stop reason: SDUPTHER

## 2017-11-02 RX ORDER — BUTALBITAL, ACETAMINOPHEN AND CAFFEINE 50; 325; 40 MG/1; MG/1; MG/1
1 TABLET ORAL EVERY 6 HOURS PRN
Status: DISCONTINUED | OUTPATIENT
Start: 2017-11-02 | End: 2017-11-02 | Stop reason: HOSPADM

## 2017-11-02 RX ORDER — OMEPRAZOLE 20 MG/1
20 CAPSULE, DELAYED RELEASE ORAL DAILY
Status: DISCONTINUED | OUTPATIENT
Start: 2017-11-02 | End: 2017-11-02 | Stop reason: HOSPADM

## 2017-11-02 RX ORDER — IPRATROPIUM BROMIDE AND ALBUTEROL SULFATE 2.5; .5 MG/3ML; MG/3ML
3 SOLUTION RESPIRATORY (INHALATION)
Status: DISCONTINUED | OUTPATIENT
Start: 2017-11-03 | End: 2017-11-02

## 2017-11-02 RX ORDER — IPRATROPIUM BROMIDE AND ALBUTEROL SULFATE 2.5; .5 MG/3ML; MG/3ML
3 SOLUTION RESPIRATORY (INHALATION)
Status: DISCONTINUED | OUTPATIENT
Start: 2017-11-02 | End: 2017-11-02

## 2017-11-02 RX ORDER — FLUTICASONE PROPIONATE 50 MCG
1 SPRAY, SUSPENSION (ML) NASAL
COMMUNITY
End: 2018-06-20

## 2017-11-02 RX ORDER — PREDNISONE 20 MG/1
40 TABLET ORAL DAILY
Qty: 10 TAB | Refills: 0 | Status: SHIPPED | OUTPATIENT
Start: 2017-11-02 | End: 2017-11-07

## 2017-11-02 RX ORDER — OXYCODONE AND ACETAMINOPHEN 10; 325 MG/1; MG/1
1 TABLET ORAL EVERY 8 HOURS PRN
Status: DISCONTINUED | OUTPATIENT
Start: 2017-11-02 | End: 2017-11-02 | Stop reason: HOSPADM

## 2017-11-02 RX ORDER — POLYETHYLENE GLYCOL 3350 17 G/17G
1 POWDER, FOR SOLUTION ORAL
Status: DISCONTINUED | OUTPATIENT
Start: 2017-11-02 | End: 2017-11-02 | Stop reason: HOSPADM

## 2017-11-02 RX ORDER — ACETAMINOPHEN 325 MG/1
650 TABLET ORAL EVERY 6 HOURS PRN
Status: DISCONTINUED | OUTPATIENT
Start: 2017-11-02 | End: 2017-11-02 | Stop reason: HOSPADM

## 2017-11-02 RX ORDER — AMOXICILLIN 250 MG
2 CAPSULE ORAL 2 TIMES DAILY
Status: DISCONTINUED | OUTPATIENT
Start: 2017-11-02 | End: 2017-11-02 | Stop reason: HOSPADM

## 2017-11-02 RX ADMIN — MORPHINE SULFATE 30 MG: 15 TABLET, EXTENDED RELEASE ORAL at 08:25

## 2017-11-02 RX ADMIN — IPRATROPIUM BROMIDE AND ALBUTEROL SULFATE 3 ML: .5; 3 SOLUTION RESPIRATORY (INHALATION) at 10:59

## 2017-11-02 RX ADMIN — LEVOTHYROXINE SODIUM 150 MCG: 150 TABLET ORAL at 05:20

## 2017-11-02 RX ADMIN — ENOXAPARIN SODIUM 40 MG: 100 INJECTION SUBCUTANEOUS at 08:25

## 2017-11-02 RX ADMIN — LORATADINE 10 MG: 10 TABLET ORAL at 12:39

## 2017-11-02 RX ADMIN — OXYCODONE HYDROCHLORIDE AND ACETAMINOPHEN 1 TABLET: 10; 325 TABLET ORAL at 00:44

## 2017-11-02 RX ADMIN — MORPHINE SULFATE 30 MG: 15 TABLET, EXTENDED RELEASE ORAL at 01:44

## 2017-11-02 RX ADMIN — IPRATROPIUM BROMIDE AND ALBUTEROL SULFATE 3 ML: .5; 3 SOLUTION RESPIRATORY (INHALATION) at 03:50

## 2017-11-02 RX ADMIN — OMEPRAZOLE 20 MG: 20 CAPSULE, DELAYED RELEASE ORAL at 08:25

## 2017-11-02 RX ADMIN — BUTALBITAL, ACETAMINOPHEN, AND CAFFEINE 1 TABLET: 50; 325; 40 TABLET ORAL at 17:39

## 2017-11-02 RX ADMIN — PREDNISONE 40 MG: 20 TABLET ORAL at 08:26

## 2017-11-02 RX ADMIN — IPRATROPIUM BROMIDE AND ALBUTEROL SULFATE 3 ML: .5; 3 SOLUTION RESPIRATORY (INHALATION) at 07:46

## 2017-11-02 ASSESSMENT — COGNITIVE AND FUNCTIONAL STATUS - GENERAL
SUGGESTED CMS G CODE MODIFIER MOBILITY: CH
SUGGESTED CMS G CODE MODIFIER DAILY ACTIVITY: CH
MOBILITY SCORE: 24
DAILY ACTIVITIY SCORE: 24

## 2017-11-02 ASSESSMENT — ENCOUNTER SYMPTOMS
HEADACHES: 1
EYE REDNESS: 0
BACK PAIN: 1
NAUSEA: 0
VOMITING: 0
DIZZINESS: 0
STRIDOR: 0
WHEEZING: 1
NERVOUS/ANXIOUS: 0
HEARTBURN: 0
SPUTUM PRODUCTION: 1
CONSTIPATION: 0
SHORTNESS OF BREATH: 1
SENSORY CHANGE: 0
BRUISES/BLEEDS EASILY: 0
ABDOMINAL PAIN: 1
CHILLS: 0
FEVER: 0
DIARRHEA: 0
WEAKNESS: 0
TREMORS: 0
ORTHOPNEA: 0
LOSS OF CONSCIOUSNESS: 0
ABDOMINAL PAIN: 0
FOCAL WEAKNESS: 0
BLURRED VISION: 0
MYALGIAS: 0
COUGH: 1
HEMOPTYSIS: 0
PALPITATIONS: 0
SEIZURES: 0
FALLS: 0
POLYDIPSIA: 0
HEADACHES: 0
SORE THROAT: 0
DOUBLE VISION: 0
PND: 1
DEPRESSION: 0

## 2017-11-02 ASSESSMENT — PAIN SCALES - GENERAL
PAINLEVEL_OUTOF10: 4
PAINLEVEL_OUTOF10: 6
PAINLEVEL_OUTOF10: 5
PAINLEVEL_OUTOF10: 0

## 2017-11-02 ASSESSMENT — PATIENT HEALTH QUESTIONNAIRE - PHQ9
SUM OF ALL RESPONSES TO PHQ QUESTIONS 1-9: 0
SUM OF ALL RESPONSES TO PHQ9 QUESTIONS 1 AND 2: 0
2. FEELING DOWN, DEPRESSED, IRRITABLE, OR HOPELESS: NOT AT ALL
1. LITTLE INTEREST OR PLEASURE IN DOING THINGS: NOT AT ALL

## 2017-11-02 ASSESSMENT — LIFESTYLE VARIABLES
EVER_SMOKED: NEVER
SUBSTANCE_ABUSE: 0
ALCOHOL_USE: NO
EVER_SMOKED: NEVER

## 2017-11-02 ASSESSMENT — COPD QUESTIONNAIRES
HAVE YOU SMOKED AT LEAST 100 CIGARETTES IN YOUR ENTIRE LIFE: NO/DON'T KNOW
DO YOU EVER COUGH UP ANY MUCUS OR PHLEGM?: NO/ONLY WITH OCCASIONAL COLDS OR INFECTIONS
DURING THE PAST 4 WEEKS HOW MUCH DID YOU FEEL SHORT OF BREATH: NONE/LITTLE OF THE TIME
COPD SCREENING SCORE: 1

## 2017-11-02 NOTE — ASSESSMENT & PLAN NOTE
Pt has a h/o of GERD, on ranitidine at home  - Start omeprazole as ranitidine is not on formulary   - restart ranitidine on discharge

## 2017-11-02 NOTE — CARE PLAN
Problem: Knowledge Deficit  Goal: Knowledge of disease process/condition, treatment plan, diagnostic tests, and medications will improve  Outcome: PROGRESSING AS EXPECTED  POC discussed with patient

## 2017-11-02 NOTE — ASSESSMENT & PLAN NOTE
Pt has h/o HTN per chart review; however pt denies having HTN and attributes elevated BP to be related to his chronic pain. Has not been taking medication OP (chlorthalidone was recommended by PCP per chart). BPs mostly in 140s on presentation  - labetalol PRN BP >170 ordered  - continue to monitor, add BP medication as clinically indicated

## 2017-11-02 NOTE — ASSESSMENT & PLAN NOTE
Pt presented with several month history of shortness of breath that was progressively worsening. Minimal relief with combivent and fluticasone at home. Pt has episodes of coughing and difficulty breathing several times a day, which occur at rest. He also reports worsening at night when he is in his bedroom. Pt has had symptoms of congestion as well. No smoking history. Previous PFT in 10/2016 showed small airway dz. On presentation, pt was hypoxic to low 90s. Labwork notable for eosinophils at 16.8%. Likely patient has reactive airway disease, which may be triggered by an environmental allergen vs possible autoimmune etiology vs hypersensitivity pneumonitis from occupational exposure vs medication side effect as his symptoms seem to be correlated with RA medications     - CT scan pending to assess for possible pneumonitis or other lung pathology; no acute pulmonary pathology   - ABG: pH 7.41, PCO2 37.3, PO2 87.1, O2 sat 96%  - Prednisone 40, Duonebs scheduled q 4 RT, albuterol prn wheezing/SOB  - O2 supplementation as needed

## 2017-11-02 NOTE — PROGRESS NOTES
Patient is A/O x4. Patient is resting in bed comfortably. Patient denies nausea/vomiting. POC discussed with patient. Call light within reach. Will continue with hourly rounding.

## 2017-11-02 NOTE — ASSESSMENT & PLAN NOTE
Pt has a h/o hypothyroidism. Reports being non-compliant with synthroid. On presentation, TSH was 24.27  - continue current dose, will need outpatient follow up for medication optimization
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
## 2017-11-02 NOTE — ED NOTES
"Christophe Jeevan [facility]    Chief Complaint   Patient presents with   • Shortness of Breath     Worsening today    • Chest Wall Pain     r/t coughing and inhalation per pt       Pt ambulatory to triage with above complaint.  Pt placed on 2 L o2, pt does not use oxygen at home.  Pt states he has been struggling with breathing for months but \"his primary care MD does not seem to care.\"  Pt in triage purse lip breathing and restless in chair.  Mild use of accessory muscles when breathing.   Pt also c/o of chest wall pain that increases with inhalation.  Expiratory wheezing noted bilaterally.  VSS.    Pt returned to [facility], educated on triage process, and to inform staff of any changes or concerns.    "

## 2017-11-02 NOTE — CARE PLAN
Problem: Safety  Goal: Will remain free from injury  Outcome: PROGRESSING AS EXPECTED  Patient education on using call light

## 2017-11-02 NOTE — CARE PLAN
Problem: Bronchoconstriction:  Goal: Improve in air movement and diminished wheezing  Outcome: PROGRESSING AS EXPECTED  Respiratory Therapy Update    Interdisciplinary Plan of Care-Goals (Indications)  Obstructive Ventilatory Defect or Pulmonary Disease without Obvious Obstruction: History / Diagnosis (11/02/17 1102)  Interdisciplinary Plan of Care-Outcomes   Bronchodilator Outcome: Patient at Stable Baseline (11/02/17 1102)          #SVN Performed: Yes (11/02/17 1102)    Cough: Strong;Moist;Non Productive (11/02/17 0117)  Sputum Amount: Unable to Evaluate (11/02/17 0200)  Sputum Color: Grey (11/01/17 2215)  Sputum Consistency: Thick (11/01/17 2215)    O2 (FiO2): 28 (11/02/17 0117)  O2 (LPM): 3 (11/02/17 0800)  O2 Daily Delivery Respiratory : Silicone Nasal Cannula (11/02/17 0747)    Breath Sounds  Pre/Post Intervention: Pre Intervention Assessment (11/02/17 0747)  RUL Breath Sounds: Clear;Diminished (11/02/17 0747)  RML Breath Sounds: Diminished (11/02/17 0747)  RLL Breath Sounds: Diminished (11/02/17 0747)  ASHLIE Breath Sounds: Clear;Diminished (11/02/17 0747)  LLL Breath Sounds: Diminished (11/02/17 0747)    Events/Summary/Plan: Patient resting at this time. Patient refused treatment (11/02/17 1514)

## 2017-11-02 NOTE — DISCHARGE PLANNING
Care Transition Team Assessment    IHD met with pt at bedside. Pt currently lives alone at home, but states that he has local family and community support to rely on. He does not use any DME, home O2, or HHC at this time.     Information Source  Orientation : Oriented x 4  Information Given By: Patient  Informant's Name: Christophe  Who is responsible for making decisions for patient? : Patient         Elopement Risk  Legal Hold: No  Ambulatory or Self Mobile in Wheelchair: Yes  Disoriented: No  Psychiatric Symptoms: None  History of Wandering: No  Elopement this Admit: No  Elopement Risk: Not at Risk for Elopement    Interdisciplinary Discharge Planning  Does Admitting Nurse Feel This Could be a Complex Discharge?: No  Primary Care Physician: Dr Hogan  Lives with - Patient's Self Care Capacity: Other (Comments) (lives by himself)  Support Systems: Friends / Neighbors  Housing / Facility: 2 Story Apartment / Condo  Do You Take your Prescribed Medications Regularly: Yes  Able to Return to Previous ADL's: Yes  Mobility Issues: No  Prior Services: None  Assistance Needed: No  Durable Medical Equipment: Not Applicable    Discharge Preparedness  What is your plan after discharge?: Home health care  What are your discharge supports?: Other (comment) (Friends/Neighbors)  Prior Functional Level: Ambulatory, Drives Self, Independent with Activities of Daily Living, Independent with Medication Management  Difficulity with ADLs: None  Difficulity with IADLs: None    Functional Assesment  Prior Functional Level: Ambulatory, Drives Self, Independent with Activities of Daily Living, Independent with Medication Management    Finances  Financial Barriers to Discharge: No  Prescription Coverage: Yes ([employer] on [address])    Vision / Hearing Impairment  Vision Impairment : No  Hearing Impairment : No    Values / Beliefs / Concerns  Values / Beliefs Concerns : No  Special Hospitalization Concerns:  (none)         Domestic Abuse  Have you  ever been the victim of abuse or violence?: No  Physical Abuse or Sexual Abuse: No  Verbal Abuse or Emotional Abuse: No  Possible Abuse Reported to:: Not Applicable    Psychological Assessment  History of Substance Abuse: None  History of Psychiatric Problems: No  Non-compliant with Treatment: No    Discharge Risks or Barriers  Discharge risks or barriers?: No    Anticipated Discharge Information  Anticipated discharge disposition: Adoption  Discharge Address: [address] [place] [address]  Discharge Contact Phone Number: [phone]

## 2017-11-02 NOTE — ED PROVIDER NOTES
ED Provider Note    CHIEF COMPLAINT  Chief Complaint   Patient presents with   • Shortness of Breath     Worsening today    • Chest Wall Pain     r/t coughing and inhalation per pt       HPI  Christophe Leary is a 57 y.o. male who presentsTo the emergency department with difficulty with breathing. The patient states this started over the last couple of weeks. He states he was evaluated by his primary care doctor and was placed on an inhaler with no significant relief. He was also placed on some nasal saline spray which seem to be effective for a couple of hours. The patient does not smoke and does not have any history of asthma or reactive airway disease that he is aware of. He does have chronic pain and takes narcotics but has not had any pulmonary or cardiac problems in the past. The patient does have a known history of hypertension and he states he has been compliant. He states he has been coughing and does have some chest wall pain associated with a cough. He does not have any exertional chest pain. He denies pain and swelling to his lower extremities.    REVIEW OF SYSTEMS  See HPI for further details. All other systems are negative.     PAST MEDICAL HISTORY  Past Medical History:   Diagnosis Date   • Fibromyalgia    • GERD (gastroesophageal reflux disease)    • Hypertension     medicated   • Hypothyroidism    • Rheumatoid arthritis (CMS-[facility])        SOCIAL HISTORY  Social History     Social History   • Marital status: Single     Spouse name: N/A   • Number of children: N/A   • Years of education: N/A     Social History Main Topics   • Smoking status: Never Smoker   • Smokeless tobacco: Never Used   • Alcohol use 0.6 oz/week     1 Cans of beer per week      Comment: rarely   • Drug use: No      Comment: METH AMPHETAMINE 20 YRS AGO   • Sexual activity: Yes     Partners: Female     Other Topics Concern   • Not on file     Social History Narrative   • No narrative on file           PHYSICAL EXAM  VITAL SIGNS:  "/103   Pulse (!) 101   Temp 36.9 °C (98.4 °F)   Resp (!) 26   Ht 1.778 m (5' 10\")   Wt 73 kg (160 lb 15 oz)   SpO2 95%   BMI 23.09 kg/m²   Constitutional:In acute distress, Non-toxic appearance.   HENT: Normocephalic, Atraumatic, tympanic membranes are intact and nonerythematous bilaterally, Oropharynx moist without exudates or erythema, Nose normal.   Eyes: PERRLA, EOMI, Conjunctiva normal.  Neck: Supple without meningismus  Lymphatic: No lymphadenopathy noted.   Cardiovascular: Tachycardic heart rate, Normal rhythm, No murmurs, No rubs, No gallops.   Thorax & Lungs: Symmetrically diminished throughout, diffuse wheezing, diffuse rhonchi, tachypnea, retractions  Abdomen: Bowel sounds normal, Soft, No tenderness, no rebound, no guarding, no distention, No masses, No pulsatile masses.   Skin: Warm, Dry, No erythema, No rash.   Back: No tenderness, No CVA tenderness.   Extremities: Atraumatic with symmetric distal pulses, No edema, No tenderness, No cyanosis, No clubbing.   Neurologic: Alert & oriented x 3, cranial nerves II through XII are intact, Normal motor function, Normal sensory function, No focal deficits noted.   Psychiatric: Affect normal, Judgment normal, Mood normal.     EKG  Twelve-lead EKG interpreted by myself shows a normal sinus rhythm with a ventricular rate of 97, QRS is poor R-wave progression, no ST segment elevation or depression, normal T waves.    RADIOLOGY/PROCEDURES  DX-CHEST-LIMITED (1 VIEW)   Final Result      No acute cardiopulmonary disease.            COURSE & MEDICAL DECISION MAKING  Pertinent Labs & Imaging studies reviewed. (See chart for details)  This a 57-year-old gentleman who presents in acute distress. The patient was treated aggressively with steroids and albuterol. According the patient is oxygen saturations 81% prior to arrival in emergency department. He was emergently placed on supplemental oxygenation due to his respiratory distress. His oxygen saturations " since that time has been 90-96%. The patient has a chest x-ray does not show any evidence of pneumonia. He does not have any risk factors for pulmonary embolus standpoint and on arrival his exam was consistent with severe reactive airway disease etiology. He does have some chest wall pain but his EKG does not support ischemia. I suspect this could be from some irritation from coughing as well as increased work of breathing. On repeat examination his respiratory has slowed and he does have a significant increase in aeration. However is oxygen saturation is only 91% on 2 liters. Therefore we'll get the patient to the hospital for further pulmonary treatment. I suspect he may have an upper respiratory infection that caused an exacerbation of reactive airway disease.    FINAL IMPRESSION  1. Reactive airway disease with hypoxemia  2. Upper respiratory infection with chest wall pain  3. Critical care time 30 minutes     Disposition  The patient has stabilized and he'll be admitted to the [facility] internal medicine resident team    Electronically signed by: Amarjit Treadwell, 11/1/2017 10:48 PM

## 2017-11-02 NOTE — PROGRESS NOTES
2 RN skin assessment. Abrasion on right anterior shin.  Scattered macules on chest, pt denies pruritus. Redness on toes and callus on R foot.
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
## 2017-11-02 NOTE — ED NOTES
Pt ambulatory to [facility]. Pt changed into gown and placed on monitor.   Agree with triage note. Wheezes noted throughout upper and lower lung fields upon auscultation.   Chart up and ready for ERP.

## 2017-11-02 NOTE — PROGRESS NOTES
Yolis Valverde Fall Risk Assessment:     Last Known Fall: No falls  Mobility: No limitations  Medications: Cardiovascular or central nervous system meds  Mental Status/LOC/Awareness: Awake, alert, and oriented to date, place, and person  Toileting Needs: No needs  Volume/Electrolyte Status: No problems  Communication/Sensory: No deficits  Behavior: Appropriate behavior  Yolis Valverde Fall Risk Total: 3  Fall Risk Level: NO RISK    Universal Fall Precautions:  call light/belongings in reach, bed in low position and locked, wheelchairs and assistive devices out of sight, siderails up x 2, use non-slip footwear    Fall Risk Level Interventions:          Patient Specific Interventions:     Medication: review medications with patient and family, assess for medications that can be discontinued or dosage decreased and limit combination of prn medications  Mental Status/LOC/Awareness: check on patient hourly and utilize bed/chair fall alarm  Toileting: provide frquent toileting  Volume/Electrolyte Status: ensure patient remains hydrated and advance diet as tolerated  Communication/Sensory: update plan of care on whiteboard  Behavioral: encourage patient to voice feelings  Mobility: utilize bed/chair fall alarm and ensure bed is locked and in lowest position

## 2017-11-02 NOTE — SENIOR ADMIT NOTE
Senior Admit Note    58 y/o M with a h/o reactive airway disease who is on combivent and proventil p/w dyspnea.   Patient has had dyspnea with productive cough for a year now.  He had worsening dyspnea today and he also reports that he was wheezy.   He reported chest pressure and tightness associated with his dyspnea and it resolved after breathing treatment.   His dyspnea much improved after breathing treatment.  He works as a  and exposed to dust and smoke.     Gen: NAD  Resp: Mild wheezing b/l.  No crackles or rhonchi.  No tachypnea or use of accessory muscle.  O2 sat 95% on RA  CVS: RRR, S1S2 wnl. No mrg.        A/P:   Asthma exacerbation  - Not in respiratory distress.  No tachypnea  - Saturating 95% on RA after breathing treatment  - CXR clear.  Elevated eosinophil.  Last PFT: Reactive airway  - Unlikely PE as no pleuritic chest pain or tachypnea.  Tachycardia following breathing treatment. Tachycardia resolved.  - ABG:  PH 7.41.  PCO2: 37.3 O2, 87 HCO2: 83  - PEF pending.    - Prednisone, scheduled duoneb, Albuterol prn.     For a detailed h/o, refer to intern HPI.

## 2017-11-02 NOTE — ASSESSMENT & PLAN NOTE
Pt has rheumatoid arthritis, has tried numerous biologics with poor control. Currently on orencia 2 x monthly. Arthritic changes notes on hands b/l.   - Continue Orenicia as prescribed
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
## 2017-11-02 NOTE — PROGRESS NOTES
Assumed care of patient at 0700.  Received report from night RN.  Pt alert and oriented x4, has no complaints of pain. Pt resting comfortably in bed.

## 2017-11-03 ENCOUNTER — PATIENT OUTREACH (OUTPATIENT)
Dept: HEALTH INFORMATION MANAGEMENT | Facility: OTHER | Age: 57
End: 2017-11-03

## 2017-11-03 NOTE — PROGRESS NOTES
Pt discharged home per md order.  Pt alert and oriented x4, no complaints of pain.  Pt breathing is non labored and he feels good.  Iv removed.  Discussed discharge instructions with pt.  Pt declined a w/c and ambulated out to car.

## 2017-11-03 NOTE — PROGRESS NOTES
Pt ambulated around unit without oxygen.  Pt oxygen sated at 93%.  Dr. Rolle made aware, pt to be discharged home. Pt made aware

## 2017-11-03 NOTE — DISCHARGE INSTRUCTIONS
Discharge Instructions    Discharged to home by car with self. Discharged via walking, hospital escort: Refused.  Special equipment needed: Not Applicable    Be sure to schedule a follow-up appointment with your primary care doctor or any specialists as instructed.     Discharge Plan:   Influenza Vaccine Indication: Patient Refuses    I understand that a diet low in cholesterol, fat, and sodium is recommended for good health. Unless I have been given specific instructions below for another diet, I accept this instruction as my diet prescription.   Other diet: Regular    Special Instructions: None    · Is patient discharged on Warfarin / Coumadin?   No     · Is patient Post Blood Transfusion?  No    Depression / Suicide Risk    As you are discharged from this [facility] facility, it is important to learn how to keep safe from harming yourself.    Recognize the warning signs:  · Abrupt changes in personality, positive or negative- including increase in energy   · Giving away possessions  · Change in eating patterns- significant weight changes-  positive or negative  · Change in sleeping patterns- unable to sleep or sleeping all the time   · Unwillingness or inability to communicate  · Depression  · Unusual sadness, discouragement and loneliness  · Talk of wanting to die  · Neglect of personal appearance   · Rebelliousness- reckless behavior  · Withdrawal from people/activities they love  · Confusion- inability to concentrate     If you or a loved one observes any of these behaviors or has concerns about self-harm, here's what you can do:  · Talk about it- your feelings and reasons for harming yourself  · Remove any means that you might use to hurt yourself (examples: pills, rope, extension cords, firearm)  · Get professional help from the community (Mental Health, Substance Abuse, psychological counseling)  · Do not be alone:Call your Safe Contact- someone whom you trust who will be there for you.  · Call your  local CRISIS HOTLINE 443-3390 or 045-950-7340  · Call your local Children's Mobile Crisis Response Team [place] (328) 770-4211 or www.[employer]  · Call the toll free National Suicide Prevention Hotlines   · National Suicide Prevention Lifeline 382-182-ZIUF (0660)  · National OpenExchange Line Network 800-SUICIDE (985-6263)

## 2017-11-05 LAB — IGE SERPL-ACNC: 53 KU/L

## 2017-11-06 LAB
ASPERGILLUS AB SER QL ID: NORMAL
ASPERGILLUS AB TITR SER CF: NORMAL {TITER}

## 2017-11-22 ENCOUNTER — OFFICE VISIT (OUTPATIENT)
Dept: INTERNAL MEDICINE | Facility: MEDICAL CENTER | Age: 57
End: 2017-11-22
Payer: MEDICAID

## 2017-11-22 VITALS
TEMPERATURE: 98.5 F | SYSTOLIC BLOOD PRESSURE: 124 MMHG | DIASTOLIC BLOOD PRESSURE: 86 MMHG | HEART RATE: 96 BPM | WEIGHT: 163 LBS | HEIGHT: 70 IN | OXYGEN SATURATION: 97 % | BODY MASS INDEX: 23.34 KG/M2

## 2017-11-22 DIAGNOSIS — M79.7 FIBROMYALGIA: ICD-10-CM

## 2017-11-22 DIAGNOSIS — Z79.891 CHRONIC USE OF OPIATE FOR THERAPEUTIC PURPOSE: ICD-10-CM

## 2017-11-22 DIAGNOSIS — K21.9 GASTROESOPHAGEAL REFLUX DISEASE, ESOPHAGITIS PRESENCE NOT SPECIFIED: ICD-10-CM

## 2017-11-22 DIAGNOSIS — E03.9 ACQUIRED HYPOTHYROIDISM: ICD-10-CM

## 2017-11-22 DIAGNOSIS — J45.40 MODERATE PERSISTENT REACTIVE AIRWAY DISEASE WITHOUT COMPLICATION: ICD-10-CM

## 2017-11-22 DIAGNOSIS — M06.9 RHEUMATOID ARTHRITIS, INVOLVING UNSPECIFIED SITE, UNSPECIFIED RHEUMATOID FACTOR PRESENCE: ICD-10-CM

## 2017-11-22 DIAGNOSIS — E78.5 DYSLIPIDEMIA: ICD-10-CM

## 2017-11-22 PROCEDURE — 99000 SPECIMEN HANDLING OFFICE-LAB: CPT | Performed by: INTERNAL MEDICINE

## 2017-11-22 PROCEDURE — 99214 OFFICE O/P EST MOD 30 MIN: CPT | Mod: GC | Performed by: INTERNAL MEDICINE

## 2017-11-22 RX ORDER — OXYCODONE AND ACETAMINOPHEN 10; 325 MG/1; MG/1
1 TABLET ORAL EVERY 8 HOURS PRN
Qty: 90 TAB | Refills: 0 | Status: SHIPPED | OUTPATIENT
Start: 2017-11-30 | End: 2018-01-03 | Stop reason: SDUPTHER

## 2017-11-22 RX ORDER — BUDESONIDE AND FORMOTEROL FUMARATE DIHYDRATE 80; 4.5 UG/1; UG/1
2 AEROSOL RESPIRATORY (INHALATION) 2 TIMES DAILY
Qty: 1 INHALER | Refills: 1 | Status: SHIPPED | OUTPATIENT
Start: 2017-11-22 | End: 2018-05-16

## 2017-11-22 RX ORDER — RANITIDINE 300 MG/1
300 TABLET ORAL 2 TIMES DAILY
Qty: 180 TAB | Refills: 0 | Status: SHIPPED | OUTPATIENT
Start: 2017-11-22 | End: 2018-03-02 | Stop reason: SDUPTHER

## 2017-11-22 RX ORDER — BUTALBITAL, ACETAMINOPHEN AND CAFFEINE 50; 325; 40 MG/1; MG/1; MG/1
TABLET ORAL
COMMUNITY
Start: 2017-09-24 | End: 2017-12-27 | Stop reason: SDUPTHER

## 2017-11-22 RX ORDER — MORPHINE SULFATE 30 MG/1
30 TABLET, FILM COATED, EXTENDED RELEASE ORAL EVERY 12 HOURS
Qty: 60 TAB | Refills: 0 | Status: SHIPPED | OUTPATIENT
Start: 2017-11-30 | End: 2018-01-03 | Stop reason: SDUPTHER

## 2017-11-22 ASSESSMENT — PAIN SCALES - GENERAL: PAINLEVEL: 7=MODERATE-SEVERE PAIN

## 2017-11-22 NOTE — PATIENT INSTRUCTIONS
- call clinic by Tuesday if you do not hear from Dr. Gomez  - stop taking Qvar and start symbicort twice a day

## 2017-11-22 NOTE — PROGRESS NOTES
Established Patient    Christophe presents today with the following:    CC: pain medication refill    HPI:     56 y/o M here for:    Reactive airway disease: admitted nov 1st for acute hypoxemic respiratory failure. He was treated with prednisone for 5 days which resulted in improvement. He has had eosinophilia since august of 2016. Referral for allergy/immunology was placed at time of discharge from the hospital. A high resolution CT did not demonstrate any interstitial process. He is currently doing okay but has not been using his Cimzia as he feels this worsens his respiratory symptoms. He does notice that he does have worse respiratory symptoms more so in the morning. He has been compliant with the beclomethasone that was prescribed to him upon hospital discharge.    Rheumatoid arthritis: Patient follows with Dr. Gomez. He is managed with Cimzia. He has not taken this medication since prior to his hospital admission.    Fibromyalgia: Is managed with his MS Contin 30 mg twice a day and Percocet 10 mg 3 times a day.    Hypothyroidism: He has been on Synthroid 150 µg for some time now. In the hospital his TSH levels were checked which was elevated at 25. He denies any symptoms of fatigue, constipation, dry skin, hoarse voice.    GERD: He continues to use his Zantac. He did not get his H pylori antibody checked.    Dyslipidemia: The panel from 2013 did show a low HDL and high LDL.    Patient Active Problem List    Diagnosis Date Noted   • Reactive airway disease 11/02/2017     Priority: High   • Chronic pain of both shoulders 05/31/2017   • Chronic use of opiate for therapeutic purpose 02/13/2017   • Essential hypertension 09/22/2016   • Right knee pain 09/20/2016   • Headache, migraine 04/29/2016   • Fibromyalgia 04/29/2016   • Acquired hypothyroidism 11/12/2015   • Rheumatoid arthritis (CMS-[facility]) 07/07/2009   • GERD (gastroesophageal reflux disease) 07/07/2009   • Dyslipidemia 07/07/2009       Current Outpatient  "Prescriptions   Medication Sig Dispense Refill   • budesonide-formoterol (SYMBICORT) 80-4.5 MCG/ACT Aerosol Inhale 2 Puffs by mouth 2 Times a Day. 1 Inhaler 1   • ranitidine (ZANTAC) 300 MG tablet Take 1 Tab by mouth 2 Times a Day. 180 Tab 0   • [START ON 11/30/2017] oxycodone-acetaminophen (PERCOCET-10)  MG Tab Take 1 Tab by mouth every 8 hours as needed for Severe Pain. 90 Tab 0   • [START ON 11/30/2017] morphine ER (MS CONTIN) 30 MG Tab CR tablet Take 1 Tab by mouth every 12 hours. 60 Tab 0   • levothyroxine (SYNTHROID) 150 MCG Tab TAKE ONE TABLET BY MOUTH EVERY MORNING ON EMPTY STOMACH 60 Tab 2   • acetaminophen/caffeine/butalbital 325-40-50 mg (FIORICET) -40 MG Tab      • Certolizumab Pegol (CIMZIA PREFILLED SC) Inject 1 Dose as instructed every 14 days.     • fluticasone (FLONASE) 50 MCG/ACT nasal spray Spray 1 Spray in nose 1 time daily as needed.     • loratadine (CLARITIN) 10 MG Tab Take 1 Tab by mouth every day. 30 Tab 1   • albuterol 108 (90 Base) MCG/ACT Aero Soln inhalation aerosol Inhale 2 Puffs by mouth every 6 hours as needed for Shortness of Breath. 8.5 g 3   • cyclobenzaprine (FLEXERIL) 10 MG Tab TAKE 1 TABLET BY MOUTH TWICE DAILY AS NEEDED FOR MODERATE PAIN OR  MUSCLE SPASMS 60 Tab 2     No current facility-administered medications for this visit.        ROS: As per HPI. Additional pertinent symptoms as noted below.    All others negative    /86   Pulse 96   Temp 36.9 °C (98.5 °F)   Ht 1.778 m (5' 10\")   Wt 73.9 kg (163 lb)   SpO2 97%   BMI 23.39 kg/m²     Physical Exam   Constitutional:  oriented to person, place, and time. No distress.   Eyes: Pupils are equal, round, and reactive to light. No scleral icterus.  Neck: Neck supple. No thyromegaly present.   Cardiovascular: Normal rate, regular rhythm and normal heart sounds.  Exam reveals no gallop and no friction rub.  No murmur heard.  Pulmonary/Chest: Breath sounds normal. Chest wall is not dull to percussion. "   Musculoskeletal:   no edema.   Neurological: alert and oriented to person, place, and time.       Note: I have reviewed all pertinent labs and diagnostic tests associated with this visit with specific comments listed under the assessment and plan below    Assessment and Plan    1. Moderate persistent reactive airway disease without complication  - Persistent eosinophilia, allergy and immunology referral pending  - Continue albuterol when necessary  - Change beclomethasone inhaler to Symbicort inhaler    2. Rheumatoid arthritis, involving unspecified site, unspecified rheumatoid factor presence (CMS-[facility])  - Called Dr. Gomez who agrees that it is okay to hold the Cimzia for about 6 weeks to see if this helps with his respiratory symptoms  - He is scheduled to see Dr. Gomez on January 25    3. Fibromyalgia  - Continue morphine 30 mg twice a day and Percocet 10 mg 3 times a day  - His last dosage change was in April during which time his MS Contin 30 mg 3 times a day was Descalated to twice a day. One attempt to decrease his Percocet from 10 mg to 7.5 mg was unsuccessful as patient that his pain was not tolerable.    4. Chronic use of opiate for therapeutic purpose  Chronic pain recheck:   Last dose of controlled substance: November 22 morning  Chronic pain treated with MS Contin 30 mg twice a day and Percocet 10 mg 3 times a day   Current alcohol or substance use: no    Consequences of Chronic Opiate therapy:  (5 A's)  Analgesia:  improved  Activity:  improved  Adverse Events:  none  Aberrant Behaviors: no inappropriate refills requested, lost or stolen meds reported.   Affect/Mood: normal reasoning    Nonnarcotic treatments that are being used: flexeril    Pain management agreement initiated/updated and signed on: Nov 22, 2017  Urine drug screen done: Dec 6, 2016 , which was reviewed today and is consistent with prescribed medications.    I have reviewed the medical records, the Prescription Monitoring Program and  I have determined that controlled substance treatment is medically indicated.      5. Acquired hypothyroidism  - TSH checked in hospital was 25  - We'll get a repeat TSH with reflex to free T4 as the prior previous TSH was checked during an acute illness    6. Gastroesophageal reflux disease, esophagitis presence not specified  - Patient advised to get H pylori antibody checked      Followup: Return in about 5 weeks (around 12/27/2017).      Signed by: Itz Hogan M.D.

## 2017-12-01 ENCOUNTER — TELEPHONE (OUTPATIENT)
Dept: INTERNAL MEDICINE | Facility: MEDICAL CENTER | Age: 57
End: 2017-12-01

## 2017-12-01 NOTE — TELEPHONE ENCOUNTER
Itz Hogan M.D.  Sivan Steven, Med Ass't             Hey, his UDS is postivie for hydrocodone when I don't prescribe it. Could you please scan in . Also, if you could call patient and inform him of this. I am currently on night float so it will be difficult for me to call. Thanks    Previous Messages               [facility] PAIN MANAGEMENT SCREEN   Order: [id number]   Status:  Edited Result - FINAL   Visible to patient:  No (Not Released)   Notes Recorded by Itz Hogan M.D. on 11/29/2017 at 9:18 PM PST  Drug screen reviewed. This is inconsistently positive for hydrocodone when patient is prescribed oxycodone. Will discuss with patient.       Last Resulted: 11/22/17

## 2017-12-27 ENCOUNTER — OFFICE VISIT (OUTPATIENT)
Dept: INTERNAL MEDICINE | Facility: MEDICAL CENTER | Age: 57
End: 2017-12-27
Payer: MEDICAID

## 2017-12-27 VITALS
DIASTOLIC BLOOD PRESSURE: 84 MMHG | WEIGHT: 158.8 LBS | TEMPERATURE: 98.5 F | BODY MASS INDEX: 22.73 KG/M2 | SYSTOLIC BLOOD PRESSURE: 124 MMHG | HEART RATE: 124 BPM | OXYGEN SATURATION: 95 % | HEIGHT: 70 IN

## 2017-12-27 DIAGNOSIS — R00.0 TACHYCARDIA WITH HEART RATE 121-140 BEATS PER MINUTE: ICD-10-CM

## 2017-12-27 DIAGNOSIS — M06.9 RHEUMATOID ARTHRITIS, INVOLVING UNSPECIFIED SITE, UNSPECIFIED RHEUMATOID FACTOR PRESENCE: ICD-10-CM

## 2017-12-27 DIAGNOSIS — G43.009 MIGRAINE WITHOUT AURA AND WITHOUT STATUS MIGRAINOSUS, NOT INTRACTABLE: ICD-10-CM

## 2017-12-27 PROCEDURE — 99214 OFFICE O/P EST MOD 30 MIN: CPT | Mod: GC | Performed by: INTERNAL MEDICINE

## 2017-12-27 PROCEDURE — 93000 ELECTROCARDIOGRAM COMPLETE: CPT | Performed by: INTERNAL MEDICINE

## 2017-12-27 RX ORDER — BUTALBITAL, ACETAMINOPHEN AND CAFFEINE 50; 325; 40 MG/1; MG/1; MG/1
1 TABLET ORAL EVERY 6 HOURS PRN
Qty: 30 TAB | Refills: 0 | Status: SHIPPED | OUTPATIENT
Start: 2017-12-27 | End: 2018-04-12 | Stop reason: SDUPTHER

## 2017-12-27 ASSESSMENT — PAIN SCALES - GENERAL: PAINLEVEL: 9=SEVERE PAIN
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
## 2017-12-29 ENCOUNTER — HOSPITAL ENCOUNTER (INPATIENT)
Facility: MEDICAL CENTER | Age: 57
LOS: 1 days | DRG: 194 | End: 2017-12-30
Attending: EMERGENCY MEDICINE | Admitting: HOSPITALIST
Payer: MEDICAID

## 2017-12-29 ENCOUNTER — APPOINTMENT (OUTPATIENT)
Dept: RADIOLOGY | Facility: MEDICAL CENTER | Age: 57
DRG: 194 | End: 2017-12-29
Attending: EMERGENCY MEDICINE
Payer: MEDICAID

## 2017-12-29 ENCOUNTER — RESOLUTE PROFESSIONAL BILLING HOSPITAL PROF FEE (OUTPATIENT)
Dept: MEDSURG UNIT | Facility: MEDICAL CENTER | Age: 57
End: 2017-12-29
Payer: MEDICAID

## 2017-12-29 DIAGNOSIS — J10.1 INFLUENZA A: ICD-10-CM

## 2017-12-29 DIAGNOSIS — R11.2 NON-INTRACTABLE VOMITING WITH NAUSEA, UNSPECIFIED VOMITING TYPE: ICD-10-CM

## 2017-12-29 DIAGNOSIS — J98.01 ACUTE BRONCHOSPASM: ICD-10-CM

## 2017-12-29 PROBLEM — E86.0 DEHYDRATION: Status: ACTIVE | Noted: 2017-12-29

## 2017-12-29 LAB
ALBUMIN SERPL BCP-MCNC: 3.6 G/DL (ref 3.2–4.9)
ALBUMIN/GLOB SERPL: 1 G/DL
ALP SERPL-CCNC: 40 U/L (ref 30–99)
ALT SERPL-CCNC: 7 U/L (ref 2–50)
ANION GAP SERPL CALC-SCNC: 13 MMOL/L (ref 0–11.9)
APPEARANCE UR: CLEAR
APTT PPP: 31.4 SEC (ref 24.7–36)
AST SERPL-CCNC: 14 U/L (ref 12–45)
BASOPHILS # BLD AUTO: 0.1 % (ref 0–1.8)
BASOPHILS # BLD: 0.01 K/UL (ref 0–0.12)
BILIRUB SERPL-MCNC: 0.7 MG/DL (ref 0.1–1.5)
BILIRUB UR QL STRIP.AUTO: NEGATIVE
BNP SERPL-MCNC: 6 PG/ML (ref 0–100)
BUN SERPL-MCNC: 16 MG/DL (ref 8–22)
CALCIUM SERPL-MCNC: 9.4 MG/DL (ref 8.5–10.5)
CHLORIDE SERPL-SCNC: 96 MMOL/L (ref 96–112)
CO2 SERPL-SCNC: 22 MMOL/L (ref 20–33)
COLOR UR: YELLOW
CREAT SERPL-MCNC: 0.81 MG/DL (ref 0.5–1.4)
EKG IMPRESSION: NORMAL
EOSINOPHIL # BLD AUTO: 0 K/UL (ref 0–0.51)
EOSINOPHIL NFR BLD: 0 % (ref 0–6.9)
ERYTHROCYTE [DISTWIDTH] IN BLOOD BY AUTOMATED COUNT: 38.9 FL (ref 35.9–50)
FLUAV RNA SPEC QL NAA+PROBE: POSITIVE
FLUBV RNA SPEC QL NAA+PROBE: NEGATIVE
GFR SERPL CREATININE-BSD FRML MDRD: >60 ML/MIN/1.73 M 2
GLOBULIN SER CALC-MCNC: 3.5 G/DL (ref 1.9–3.5)
GLUCOSE SERPL-MCNC: 102 MG/DL (ref 65–99)
GLUCOSE UR STRIP.AUTO-MCNC: NEGATIVE MG/DL
HCT VFR BLD AUTO: 38.6 % (ref 42–52)
HGB BLD-MCNC: 13.4 G/DL (ref 14–18)
IMM GRANULOCYTES # BLD AUTO: 0.08 K/UL (ref 0–0.11)
IMM GRANULOCYTES NFR BLD AUTO: 0.9 % (ref 0–0.9)
INR PPP: 1.02 (ref 0.87–1.13)
KETONES UR STRIP.AUTO-MCNC: 40 MG/DL
LACTATE BLD-SCNC: 1.6 MMOL/L (ref 0.5–2)
LEUKOCYTE ESTERASE UR QL STRIP.AUTO: NEGATIVE
LYMPHOCYTES # BLD AUTO: 0.98 K/UL (ref 1–4.8)
LYMPHOCYTES NFR BLD: 10.9 % (ref 22–41)
MCH RBC QN AUTO: 28.4 PG (ref 27–33)
MCHC RBC AUTO-ENTMCNC: 34.7 G/DL (ref 33.7–35.3)
MCV RBC AUTO: 81.8 FL (ref 81.4–97.8)
MICRO URNS: ABNORMAL
MONOCYTES # BLD AUTO: 0.65 K/UL (ref 0–0.85)
MONOCYTES NFR BLD AUTO: 7.3 % (ref 0–13.4)
NEUTROPHILS # BLD AUTO: 7.23 K/UL (ref 1.82–7.42)
NEUTROPHILS NFR BLD: 80.8 % (ref 44–72)
NITRITE UR QL STRIP.AUTO: NEGATIVE
NRBC # BLD AUTO: 0 K/UL
NRBC BLD-RTO: 0 /100 WBC
PH UR STRIP.AUTO: 5.5 [PH]
PLATELET # BLD AUTO: 236 K/UL (ref 164–446)
PMV BLD AUTO: 9.6 FL (ref 9–12.9)
POTASSIUM SERPL-SCNC: 3.7 MMOL/L (ref 3.6–5.5)
PROCALCITONIN SERPL-MCNC: 0.64 NG/ML
PROT SERPL-MCNC: 7.1 G/DL (ref 6–8.2)
PROT UR QL STRIP: NEGATIVE MG/DL
PROTHROMBIN TIME: 13.1 SEC (ref 12–14.6)
RBC # BLD AUTO: 4.72 M/UL (ref 4.7–6.1)
RBC UR QL AUTO: NEGATIVE
SODIUM SERPL-SCNC: 131 MMOL/L (ref 135–145)
SP GR UR STRIP.AUTO: 1.01
TROPONIN I SERPL-MCNC: <0.01 NG/ML (ref 0–0.04)
UROBILINOGEN UR STRIP.AUTO-MCNC: 0.2 MG/DL
WBC # BLD AUTO: 9 K/UL (ref 4.8–10.8)

## 2017-12-29 PROCEDURE — 770001 HCHG ROOM/CARE - MED/SURG/GYN PRIV*

## 2017-12-29 PROCEDURE — 99285 EMERGENCY DEPT VISIT HI MDM: CPT

## 2017-12-29 PROCEDURE — 87040 BLOOD CULTURE FOR BACTERIA: CPT

## 2017-12-29 PROCEDURE — 94760 N-INVAS EAR/PLS OXIMETRY 1: CPT

## 2017-12-29 PROCEDURE — 87086 URINE CULTURE/COLONY COUNT: CPT

## 2017-12-29 PROCEDURE — 81003 URINALYSIS AUTO W/O SCOPE: CPT

## 2017-12-29 PROCEDURE — 700111 HCHG RX REV CODE 636 W/ 250 OVERRIDE (IP): Performed by: EMERGENCY MEDICINE

## 2017-12-29 PROCEDURE — 85730 THROMBOPLASTIN TIME PARTIAL: CPT

## 2017-12-29 PROCEDURE — 93005 ELECTROCARDIOGRAM TRACING: CPT | Performed by: EMERGENCY MEDICINE

## 2017-12-29 PROCEDURE — 83880 ASSAY OF NATRIURETIC PEPTIDE: CPT

## 2017-12-29 PROCEDURE — 700105 HCHG RX REV CODE 258: Performed by: EMERGENCY MEDICINE

## 2017-12-29 PROCEDURE — 84145 PROCALCITONIN (PCT): CPT

## 2017-12-29 PROCEDURE — 80053 COMPREHEN METABOLIC PANEL: CPT

## 2017-12-29 PROCEDURE — 94640 AIRWAY INHALATION TREATMENT: CPT

## 2017-12-29 PROCEDURE — 84484 ASSAY OF TROPONIN QUANT: CPT

## 2017-12-29 PROCEDURE — 700101 HCHG RX REV CODE 250: Performed by: EMERGENCY MEDICINE

## 2017-12-29 PROCEDURE — 85025 COMPLETE CBC W/AUTO DIFF WBC: CPT

## 2017-12-29 PROCEDURE — 96376 TX/PRO/DX INJ SAME DRUG ADON: CPT

## 2017-12-29 PROCEDURE — 87502 INFLUENZA DNA AMP PROBE: CPT

## 2017-12-29 PROCEDURE — 83605 ASSAY OF LACTIC ACID: CPT

## 2017-12-29 PROCEDURE — 85610 PROTHROMBIN TIME: CPT

## 2017-12-29 PROCEDURE — 71010 DX-CHEST-PORTABLE (1 VIEW): CPT

## 2017-12-29 PROCEDURE — 96375 TX/PRO/DX INJ NEW DRUG ADDON: CPT

## 2017-12-29 RX ORDER — GUAIFENESIN/DEXTROMETHORPHAN 100-10MG/5
10 SYRUP ORAL EVERY 6 HOURS PRN
Status: DISCONTINUED | OUTPATIENT
Start: 2017-12-29 | End: 2017-12-30 | Stop reason: HOSPADM

## 2017-12-29 RX ORDER — MORPHINE SULFATE 4 MG/ML
4 INJECTION, SOLUTION INTRAMUSCULAR; INTRAVENOUS
Status: DISCONTINUED | OUTPATIENT
Start: 2017-12-29 | End: 2017-12-30

## 2017-12-29 RX ORDER — POLYETHYLENE GLYCOL 3350 17 G/17G
1 POWDER, FOR SOLUTION ORAL
Status: DISCONTINUED | OUTPATIENT
Start: 2017-12-29 | End: 2017-12-30 | Stop reason: HOSPADM

## 2017-12-29 RX ORDER — SODIUM CHLORIDE 9 MG/ML
INJECTION, SOLUTION INTRAVENOUS CONTINUOUS
Status: DISCONTINUED | OUTPATIENT
Start: 2017-12-30 | End: 2017-12-30 | Stop reason: HOSPADM

## 2017-12-29 RX ORDER — METHYLPREDNISOLONE SODIUM SUCCINATE 125 MG/2ML
125 INJECTION, POWDER, LYOPHILIZED, FOR SOLUTION INTRAMUSCULAR; INTRAVENOUS ONCE
Status: COMPLETED | OUTPATIENT
Start: 2017-12-29 | End: 2017-12-29

## 2017-12-29 RX ORDER — ONDANSETRON 2 MG/ML
4 INJECTION INTRAMUSCULAR; INTRAVENOUS ONCE
Status: COMPLETED | OUTPATIENT
Start: 2017-12-29 | End: 2017-12-29

## 2017-12-29 RX ORDER — SODIUM CHLORIDE 9 MG/ML
1000 INJECTION, SOLUTION INTRAVENOUS ONCE
Status: COMPLETED | OUTPATIENT
Start: 2017-12-29 | End: 2017-12-29

## 2017-12-29 RX ORDER — AMOXICILLIN 250 MG
2 CAPSULE ORAL 2 TIMES DAILY
Status: DISCONTINUED | OUTPATIENT
Start: 2017-12-30 | End: 2017-12-30 | Stop reason: HOSPADM

## 2017-12-29 RX ORDER — ACETAMINOPHEN 325 MG/1
650 TABLET ORAL EVERY 6 HOURS PRN
Status: DISCONTINUED | OUTPATIENT
Start: 2017-12-29 | End: 2017-12-30 | Stop reason: HOSPADM

## 2017-12-29 RX ORDER — BISACODYL 10 MG
10 SUPPOSITORY, RECTAL RECTAL
Status: DISCONTINUED | OUTPATIENT
Start: 2017-12-29 | End: 2017-12-30 | Stop reason: HOSPADM

## 2017-12-29 RX ORDER — SODIUM CHLORIDE 9 MG/ML
30 INJECTION, SOLUTION INTRAVENOUS
Status: COMPLETED | OUTPATIENT
Start: 2017-12-29 | End: 2017-12-29

## 2017-12-29 RX ADMIN — METHYLPREDNISOLONE SODIUM SUCCINATE 125 MG: 125 INJECTION, POWDER, FOR SOLUTION INTRAMUSCULAR; INTRAVENOUS at 18:00

## 2017-12-29 RX ADMIN — ALBUTEROL SULFATE 2.5 MG: 2.5 SOLUTION RESPIRATORY (INHALATION) at 22:11

## 2017-12-29 RX ADMIN — IPRATROPIUM BROMIDE 0.5 MG: 0.5 SOLUTION RESPIRATORY (INHALATION) at 22:11

## 2017-12-29 RX ADMIN — IPRATROPIUM BROMIDE 0.5 MG: 0.5 SOLUTION RESPIRATORY (INHALATION) at 17:53

## 2017-12-29 RX ADMIN — SODIUM CHLORIDE 1000 ML: 9 INJECTION, SOLUTION INTRAVENOUS at 23:26

## 2017-12-29 RX ADMIN — SODIUM CHLORIDE 2094 ML: 9 INJECTION, SOLUTION INTRAVENOUS at 18:15

## 2017-12-29 RX ADMIN — ONDANSETRON 4 MG: 2 INJECTION INTRAMUSCULAR; INTRAVENOUS at 22:07

## 2017-12-29 RX ADMIN — MORPHINE SULFATE 4 MG: 4 INJECTION INTRAVENOUS at 22:07

## 2017-12-29 RX ADMIN — ALBUTEROL SULFATE 2.5 MG: 2.5 SOLUTION RESPIRATORY (INHALATION) at 17:53

## 2017-12-29 RX ADMIN — ONDANSETRON 4 MG: 2 INJECTION INTRAMUSCULAR; INTRAVENOUS at 18:00

## 2017-12-29 ASSESSMENT — LIFESTYLE VARIABLES: DO YOU DRINK ALCOHOL: NO

## 2017-12-29 ASSESSMENT — PAIN SCALES - GENERAL: PAINLEVEL_OUTOF10: 5

## 2017-12-29 NOTE — ED NOTES
".  Chief Complaint   Patient presents with   • N/V     x 2 weeks, unable to tolerate PO   • Cough     x 2 weeks, green sputum     ./92   Pulse 88   Temp 36.6 °C (97.9 °F) (Temporal)   Resp 18   Ht 1.778 m (5' 10\")   Wt 69.8 kg (153 lb 14.1 oz)   SpO2 97%   BMI 22.08 kg/m²     BIB EMS with above complaints x 2 weeks, patient given mask, educated on triage process, placed in lobby, told to inform staff of any changes in condition.    "

## 2017-12-30 VITALS
HEIGHT: 70 IN | RESPIRATION RATE: 16 BRPM | WEIGHT: 153.88 LBS | TEMPERATURE: 98.2 F | DIASTOLIC BLOOD PRESSURE: 90 MMHG | OXYGEN SATURATION: 92 % | BODY MASS INDEX: 22.03 KG/M2 | HEART RATE: 80 BPM | SYSTOLIC BLOOD PRESSURE: 139 MMHG

## 2017-12-30 PROBLEM — J44.9 COPD (CHRONIC OBSTRUCTIVE PULMONARY DISEASE) (HCC): Status: ACTIVE | Noted: 2017-12-30

## 2017-12-30 PROCEDURE — A9270 NON-COVERED ITEM OR SERVICE: HCPCS | Performed by: STUDENT IN AN ORGANIZED HEALTH CARE EDUCATION/TRAINING PROGRAM

## 2017-12-30 PROCEDURE — 700102 HCHG RX REV CODE 250 W/ 637 OVERRIDE(OP): Performed by: STUDENT IN AN ORGANIZED HEALTH CARE EDUCATION/TRAINING PROGRAM

## 2017-12-30 PROCEDURE — 700111 HCHG RX REV CODE 636 W/ 250 OVERRIDE (IP): Performed by: HOSPITALIST

## 2017-12-30 PROCEDURE — 99236 HOSP IP/OBS SAME DATE HI 85: CPT | Performed by: HOSPITALIST

## 2017-12-30 PROCEDURE — 96365 THER/PROPH/DIAG IV INF INIT: CPT

## 2017-12-30 PROCEDURE — 700111 HCHG RX REV CODE 636 W/ 250 OVERRIDE (IP): Performed by: EMERGENCY MEDICINE

## 2017-12-30 PROCEDURE — 700105 HCHG RX REV CODE 258: Performed by: STUDENT IN AN ORGANIZED HEALTH CARE EDUCATION/TRAINING PROGRAM

## 2017-12-30 PROCEDURE — 700111 HCHG RX REV CODE 636 W/ 250 OVERRIDE (IP): Performed by: STUDENT IN AN ORGANIZED HEALTH CARE EDUCATION/TRAINING PROGRAM

## 2017-12-30 PROCEDURE — A9270 NON-COVERED ITEM OR SERVICE: HCPCS | Performed by: HOSPITALIST

## 2017-12-30 PROCEDURE — 700102 HCHG RX REV CODE 250 W/ 637 OVERRIDE(OP): Performed by: HOSPITALIST

## 2017-12-30 PROCEDURE — 700105 HCHG RX REV CODE 258: Performed by: HOSPITALIST

## 2017-12-30 RX ORDER — BUDESONIDE AND FORMOTEROL FUMARATE DIHYDRATE 80; 4.5 UG/1; UG/1
2 AEROSOL RESPIRATORY (INHALATION)
Status: DISCONTINUED | OUTPATIENT
Start: 2017-12-30 | End: 2017-12-30 | Stop reason: HOSPADM

## 2017-12-30 RX ORDER — MORPHINE SULFATE 30 MG/1
30 TABLET, FILM COATED, EXTENDED RELEASE ORAL EVERY 12 HOURS
Status: DISCONTINUED | OUTPATIENT
Start: 2017-12-30 | End: 2017-12-30 | Stop reason: HOSPADM

## 2017-12-30 RX ORDER — LORATADINE 10 MG/1
10 TABLET ORAL DAILY
Status: DISCONTINUED | OUTPATIENT
Start: 2017-12-30 | End: 2017-12-30 | Stop reason: HOSPADM

## 2017-12-30 RX ORDER — OXYCODONE AND ACETAMINOPHEN 10; 325 MG/1; MG/1
1 TABLET ORAL EVERY 8 HOURS PRN
Status: DISCONTINUED | OUTPATIENT
Start: 2017-12-30 | End: 2017-12-30 | Stop reason: HOSPADM

## 2017-12-30 RX ORDER — ALBUTEROL SULFATE 90 UG/1
2 AEROSOL, METERED RESPIRATORY (INHALATION)
Status: DISCONTINUED | OUTPATIENT
Start: 2017-12-30 | End: 2017-12-30 | Stop reason: HOSPADM

## 2017-12-30 RX ORDER — LEVOTHYROXINE SODIUM 0.07 MG/1
150 TABLET ORAL
Status: DISCONTINUED | OUTPATIENT
Start: 2017-12-30 | End: 2017-12-30 | Stop reason: HOSPADM

## 2017-12-30 RX ORDER — CYCLOBENZAPRINE HCL 10 MG
10 TABLET ORAL 2 TIMES DAILY PRN
Status: DISCONTINUED | OUTPATIENT
Start: 2017-12-30 | End: 2017-12-30 | Stop reason: HOSPADM

## 2017-12-30 RX ORDER — AZITHROMYCIN 250 MG/1
TABLET, FILM COATED ORAL
Qty: 4 TAB | Refills: 0 | Status: SHIPPED | OUTPATIENT
Start: 2017-12-31 | End: 2018-01-03

## 2017-12-30 RX ADMIN — MORPHINE SULFATE 30 MG: 30 TABLET, EXTENDED RELEASE ORAL at 07:41

## 2017-12-30 RX ADMIN — AZITHROMYCIN FOR INJECTION INJECTION, POWDER, LYOPHILIZED, FOR SOLUTION 500 MG: 500 INJECTION INTRAVENOUS at 02:30

## 2017-12-30 RX ADMIN — SODIUM CHLORIDE: 9 INJECTION, SOLUTION INTRAVENOUS at 02:30

## 2017-12-30 RX ADMIN — OXYCODONE HYDROCHLORIDE AND ACETAMINOPHEN 1 TABLET: 10; 325 TABLET ORAL at 07:42

## 2017-12-30 RX ADMIN — LEVOTHYROXINE SODIUM 150 MCG: 75 TABLET ORAL at 06:27

## 2017-12-30 RX ADMIN — GUAIFENESIN AND DEXTROMETHORPHAN 10 ML: 100; 10 SYRUP ORAL at 06:27

## 2017-12-30 RX ADMIN — LORATADINE 10 MG: 10 TABLET ORAL at 07:41

## 2017-12-30 RX ADMIN — STANDARDIZED SENNA CONCENTRATE AND DOCUSATE SODIUM 2 TABLET: 8.6; 5 TABLET, FILM COATED ORAL at 07:41

## 2017-12-30 RX ADMIN — ENOXAPARIN SODIUM 40 MG: 100 INJECTION SUBCUTANEOUS at 07:41

## 2017-12-30 RX ADMIN — MORPHINE SULFATE 4 MG: 4 INJECTION INTRAVENOUS at 03:55

## 2017-12-30 RX ADMIN — BUDESONIDE AND FORMOTEROL FUMARATE DIHYDRATE 2 PUFF: 80; 4.5 AEROSOL RESPIRATORY (INHALATION) at 08:02

## 2017-12-30 ASSESSMENT — ENCOUNTER SYMPTOMS
DIARRHEA: 1
PALPITATIONS: 0
NAUSEA: 1
SORE THROAT: 0
DIZZINESS: 0
HEADACHES: 1
DEPRESSION: 0
POLYDIPSIA: 0
DOUBLE VISION: 0
SHORTNESS OF BREATH: 1
FALLS: 0
CHILLS: 0
COUGH: 1
HEMOPTYSIS: 0
ABDOMINAL PAIN: 0
WHEEZING: 0
BLURRED VISION: 0
BACK PAIN: 1
FOCAL WEAKNESS: 0
LOSS OF CONSCIOUSNESS: 0
FEVER: 0
BLOOD IN STOOL: 0
VOMITING: 1
SPUTUM PRODUCTION: 1
WEAKNESS: 1

## 2017-12-30 ASSESSMENT — LIFESTYLE VARIABLES
EVER_SMOKED: NEVER
EVER_SMOKED: NEVER
ALCOHOL_USE: NO

## 2017-12-30 ASSESSMENT — COPD QUESTIONNAIRES
DURING THE PAST 4 WEEKS HOW MUCH DID YOU FEEL SHORT OF BREATH: SOME OF THE TIME
COPD SCREENING SCORE: 5
HAVE YOU SMOKED AT LEAST 100 CIGARETTES IN YOUR ENTIRE LIFE: NO/DON'T KNOW
DO YOU EVER COUGH UP ANY MUCUS OR PHLEGM?: YES, EVERY DAY

## 2017-12-30 ASSESSMENT — PATIENT HEALTH QUESTIONNAIRE - PHQ9
SUM OF ALL RESPONSES TO PHQ QUESTIONS 1-9: 0
1. LITTLE INTEREST OR PLEASURE IN DOING THINGS: NOT AT ALL
2. FEELING DOWN, DEPRESSED, IRRITABLE, OR HOPELESS: NOT AT ALL
SUM OF ALL RESPONSES TO PHQ9 QUESTIONS 1 AND 2: 0

## 2017-12-30 ASSESSMENT — PAIN SCALES - GENERAL
PAINLEVEL_OUTOF10: 7
PAINLEVEL_OUTOF10: 8
PAINLEVEL_OUTOF10: 9
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
## 2017-12-30 NOTE — ASSESSMENT & PLAN NOTE
Resolved.  Patient presented with tachycardia and on physical exam seem dehydrated with dry oral mucosa.   He was given IVF.  D/c IVF. Patient well hydrated.

## 2017-12-30 NOTE — ASSESSMENT & PLAN NOTE
Patient found to be positive for Influenza A   At this time will no start Tamiflu because it has been more than 48 hours since symptoms started.   Because the procalcitonin is elevated patient started azithromycin.   Patient much better today with no wheeze  or SOB  D/c home on azithromycin 250mg x 4days

## 2017-12-30 NOTE — PROGRESS NOTES
2 RN skin check completed with RNBarbra. Pt has large scab to left knee. Dry, flaky skin on bilateral legs and feet. Otherwise skin is intact and free from any other breakdown.

## 2017-12-30 NOTE — CARE PLAN
Problem: Communication  Goal: The ability to communicate needs accurately and effectively will improve    Intervention: Educate patient and significant other/support system about the plan of care, procedures, treatments, medications and allow for questions  POC discussed with pt along with medications and unit routine.       Problem: Infection  Goal: Will remain free from infection    Intervention: Implement standard precautions and perform hand washing before and after patient contact  Hand washing in place along with droplet precautions.

## 2017-12-30 NOTE — PROGRESS NOTES
Pt discharged home. Discharge instructions reviewed with pt and signed copy in chart, prescriptions given to pt. All questions addressed.

## 2017-12-30 NOTE — DISCHARGE INSTRUCTIONS
Discharge Instructions    Discharged to home by taxi with friend. Discharged via wheelchair, hospital escort: Yes.  Special equipment needed: Not Applicable    Be sure to schedule a follow-up appointment with your primary care doctor or any specialists as instructed.     Discharge Plan:   Diet Plan: Discussed  Activity Level: Discussed  Confirmed Follow up Appointment: Patient to Call and Schedule Appointment  Confirmed Symptoms Management: Discussed  Medication Reconciliation Updated: Yes  Influenza Vaccine Indication: Patient Refuses    I understand that a diet low in cholesterol, fat, and sodium is recommended for good health. Unless I have been given specific instructions below for another diet, I accept this instruction as my diet prescription.   Other diet: regular      Special Instructions: None    · Is patient discharged on Warfarin / Coumadin?   No     · Is patient Post Blood Transfusion?  No    Depression / Suicide Risk    As you are discharged from this [facility] facility, it is important to learn how to keep safe from harming yourself.    Recognize the warning signs:  · Abrupt changes in personality, positive or negative- including increase in energy   · Giving away possessions  · Change in eating patterns- significant weight changes-  positive or negative  · Change in sleeping patterns- unable to sleep or sleeping all the time   · Unwillingness or inability to communicate  · Depression  · Unusual sadness, discouragement and loneliness  · Talk of wanting to die  · Neglect of personal appearance   · Rebelliousness- reckless behavior  · Withdrawal from people/activities they love  · Confusion- inability to concentrate     If you or a loved one observes any of these behaviors or has concerns about self-harm, here's what you can do:  · Talk about it- your feelings and reasons for harming yourself  · Remove any means that you might use to hurt yourself (examples: pills, rope, extension  cords, firearm)  · Get professional help from the community (Mental Health, Substance Abuse, psychological counseling)  · Do not be alone:Call your Safe Contact- someone whom you trust who will be there for you.  · Call your local CRISIS HOTLINE 836-3326 or 776-092-7223  · Call your local Children's Mobile Crisis Response Team [place] (301) 778-2486 or www.[employer]imic  · Call the toll free National Suicide Prevention Hotlines   · National Suicide Prevention Lifeline 323-674-TGAK (6354)  · [employer] Hope Line Network 800-SUICIDE (828-0165)            Antibiotic Medication  Antibiotics are among the most frequently prescribed medicines. Antibiotics cure illness by assisting our body to injure or kill the bacteria that cause infection. While antibiotics are useful to treat a wide variety of infections they are useless against viruses. Antibiotics cannot cure colds, flu, or other viral infections.   There are many types of antibiotics available. Your caregiver will decide which antibiotic will be useful for an illness. Never take or give someone else's antibiotics or left over medicine.  Your caregiver may also take into account:  · Allergies.  · The cost of the medicine.  · Dosing schedules.  · Taste.  · Common side effects when choosing an antibiotic for an infection.  Ask your caregiver if you have questions about why a certain medicine was chosen.  HOME CARE INSTRUCTIONS  Read all instructions and labels on medicine bottles carefully. Some antibiotics should be taken on an empty stomach while others should be taken with food. Taking antibiotics incorrectly may reduce how well they work. Some antibiotics need to be kept in the refrigerator. Others should be kept at room temperature. Ask your caregiver or pharmacist if you do not understand how to give the medicine.  Be sure to give the amount of medicine your caregiver has prescribed. Even if you feel better and your symptoms improve, bacteria may still  remain alive in the body. Taking all of the medicine will prevent:  · The infection from returning and becoming harder to treat.  · Complications from partially treated infections.  If there is any medicine left over after you have taken the medicine as your caregiver has instructed, throw the medicine away.  Be sure to tell your caregiver if you:  · Are allergic to any medicines.  · Are pregnant or intend to become pregnant while using this medicine.  · Are breastfeeding.  · Are taking any other prescription, non-prescription medicine, or herbal remedies.  · Have any other medical conditions or problems you have not already discussed.  If you are taking birth control pills, they may not work while you are on antibiotics. To avoid unwanted pregnancy:  · Continue taking your birth control pills as usual.  · Use a second form of birth control (such as condoms) while you are taking antibiotic medicine.  · When you finish taking the antibiotic medicine, continue using the second form of birth control until you are finished with your current 1 month cycle of birth control pills.  Try not to miss any doses of medicine. If you miss a dose, take it as soon as possible. However, if it is almost time for the next dose and the dosing schedule is:  · 2 doses a day, take the missed dose and the next dose 5 to 6 hours apart.  · 3 or more doses a day, take the missed dose and the next dose 2 to 4 hours apart, then go back to the normal schedule.  · If you are unable to make up a missed dose, take the next scheduled dose on time and complete the missed dose at the end of the prescribed time for your medicine.  SIDE EFFECTS TO TAKING ANTIBIOTICS  Common side effects to antibiotic use include:  · Soft stools or diarrhea.  · Mild stomach upset.  · Sun sensitivity.  SEEK MEDICAL CARE IF:   · If you get worse or do not improve within a few days of starting the medicine.  · Vomiting develops.  · Diaper rash or rash on the genitals  appears.  · Vaginal itching occurs.  · White patches appear on the tongue or in the mouth.  · Severe watery diarrhea and abdominal cramps occur.  · Signs of an allergy develop (hives, unknown itchy rash appears). STOP TAKING THE ANTIBIOTIC.  SEEK IMMEDIATE MEDICAL CARE IF:   · Urine turns dark or blood colored.  · Skin turns yellow.  · Easy bruising or bleeding occurs.  · Joint pain or muscle aches occur.  · Fever returns.  · Severe headache occurs.  · Signs of an allergy develop (trouble breathing, wheezing, swelling of the lips, face or tongue, fainting, or blisters on the skin or in the mouth). STOP TAKING THE ANTIBIOTIC.     This information is not intended to replace advice given to you by your health care provider. Make sure you discuss any questions you have with your health care provider.     Document Released: 08/30/2005 Document Revised: 01/08/2016 Document Reviewed: 09/09/2010  [employer] Patient Education ©2016 [employer]
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
## 2017-12-30 NOTE — ED NOTES
Pts brother Dwayne called and I updated him to his brothers status as stable and rec. The appropriate care, I let him know that he is no distress.

## 2017-12-30 NOTE — PROGRESS NOTES
Pt A&Ox4, expressing generalized pain to entire body 9/10. Explains he has had chronic pain issues for 30 years. Medicated per MAR. Explained POC includinng pain and symptom management. Droplet precautions in place,  in place. Fluids running to patent right AC PIV. Dysphagia screening passed, advanced to regular diet. Rounding in place.

## 2017-12-30 NOTE — ED NOTES
Med rec complete per Pt at bedside  Pt stated he took his morning medication today  But threw them all up about 5 minutes after  Allergies reviewed  No ABX in last 30 days

## 2017-12-30 NOTE — SENIOR ADMIT NOTE
Mr Leary is a 56 yo male who presents with several weeks of nausea, myalgias and productive cough.      Chart Review    Admit 11/2017 for dyspnea and productive cough. He reported chest pressure and tighteness that resolved. With breathing treatments. Eosinophelia with worked up, aspergillus ab negative and he was discharged with referral for allergy and immunology as outpatient. He was treated wit hsteroids, duo nebs, oxygen and discharged on prednisone taper.   Admitted 11/2015 for tooth pain and facial sweelling.     Influenza A   - Outside flu window  Sinus tachycardia    - Dehydration  Elevated procalcitonin   - Add Azithro   Nausea, vomiting  Mild hyponatremia  Normocytic anemia   History of chronic perisistant eosinophelia    - Aspergillus negative   - ?EGPA   - Resolved now with steroids    - No indication for steroids this admission, no wheeze, stable

## 2017-12-31 ENCOUNTER — PATIENT OUTREACH (OUTPATIENT)
Dept: HEALTH INFORMATION MANAGEMENT | Facility: OTHER | Age: 57
End: 2017-12-31

## 2017-12-31 LAB
BACTERIA UR CULT: NORMAL
SIGNIFICANT IND 70042: NORMAL
SITE SITE: NORMAL
SOURCE SOURCE: NORMAL

## 2017-12-31 NOTE — PROGRESS NOTES
Placed discharge outreach phone call to patient s/p hospital discharge 12/30/17.  Left voicemail providing my contact information and instructions to call with any questions or concerns.

## 2018-01-03 ENCOUNTER — OFFICE VISIT (OUTPATIENT)
Dept: INTERNAL MEDICINE | Facility: MEDICAL CENTER | Age: 58
End: 2018-01-03
Payer: MEDICAID

## 2018-01-03 VITALS
HEART RATE: 92 BPM | HEIGHT: 70 IN | TEMPERATURE: 98.7 F | SYSTOLIC BLOOD PRESSURE: 138 MMHG | RESPIRATION RATE: 16 BRPM | OXYGEN SATURATION: 95 % | DIASTOLIC BLOOD PRESSURE: 82 MMHG | WEIGHT: 156 LBS | BODY MASS INDEX: 22.33 KG/M2

## 2018-01-03 DIAGNOSIS — Z79.891 CHRONIC USE OF OPIATE FOR THERAPEUTIC PURPOSE: ICD-10-CM

## 2018-01-03 DIAGNOSIS — D72.10 EOSINOPHILIA: ICD-10-CM

## 2018-01-03 DIAGNOSIS — J45.40 MODERATE PERSISTENT REACTIVE AIRWAY DISEASE WITHOUT COMPLICATION: ICD-10-CM

## 2018-01-03 DIAGNOSIS — M06.9 RHEUMATOID ARTHRITIS, INVOLVING UNSPECIFIED SITE, UNSPECIFIED RHEUMATOID FACTOR PRESENCE: ICD-10-CM

## 2018-01-03 DIAGNOSIS — M79.7 FIBROMYALGIA: ICD-10-CM

## 2018-01-03 PROBLEM — J10.1 INFLUENZA A: Status: RESOLVED | Noted: 2017-12-29 | Resolved: 2018-01-03

## 2018-01-03 LAB
BACTERIA BLD CULT: NORMAL
BACTERIA BLD CULT: NORMAL
SIGNIFICANT IND 70042: NORMAL
SIGNIFICANT IND 70042: NORMAL
SITE SITE: NORMAL
SITE SITE: NORMAL
SOURCE SOURCE: NORMAL
SOURCE SOURCE: NORMAL

## 2018-01-03 PROCEDURE — 99213 OFFICE O/P EST LOW 20 MIN: CPT | Mod: GE | Performed by: INTERNAL MEDICINE

## 2018-01-03 RX ORDER — ALBUTEROL SULFATE 90 UG/1
2 AEROSOL, METERED RESPIRATORY (INHALATION) EVERY 6 HOURS PRN
Qty: 8.5 G | Refills: 3 | Status: SHIPPED | OUTPATIENT
Start: 2018-01-03 | End: 2018-06-20

## 2018-01-03 RX ORDER — OXYCODONE AND ACETAMINOPHEN 10; 325 MG/1; MG/1
1 TABLET ORAL EVERY 8 HOURS PRN
Qty: 90 TAB | Refills: 0 | Status: SHIPPED | OUTPATIENT
Start: 2018-01-03 | End: 2018-02-02 | Stop reason: SDUPTHER

## 2018-01-03 RX ORDER — MORPHINE SULFATE 30 MG/1
30 TABLET, FILM COATED, EXTENDED RELEASE ORAL EVERY 12 HOURS
Qty: 60 TAB | Refills: 0 | Status: SHIPPED | OUTPATIENT
Start: 2018-01-03 | End: 2018-02-02 | Stop reason: SDUPTHER

## 2018-01-03 ASSESSMENT — PAIN SCALES - GENERAL: PAINLEVEL: 7=MODERATE-SEVERE PAIN

## 2018-01-03 NOTE — PATIENT INSTRUCTIONS
Orthopedic referral sent to:    [facility] [place]  [address] [facility] [address]  [facility] [address]  [phone]

## 2018-01-03 NOTE — PROGRESS NOTES
Established Patient    Christophe presents today with the following:    CC: Hospital follow up for influenza, med refill    HPI:     57-year-old male who presents for hospital follow-up for influenza A. Patient has had 2 separate hospitalizations due to respiratory issues in the last few months. During one of these hospitalizations he was found to have eosinophilia and concern for reactive airway disease. During this hospitalization he was hospitalized for a couple days for influenza A but was out of the Tamiflu window. He was given a prescription for azithromycin on discharge which she is still not filled. He states he has not had a chance to go to the pharmacy. Currently denies any shortness of breath or wheezing. Oxygen saturations are appropriate on room air today. There is nothing specifically mentioned that needed follow-up from his hospital stay.    Rheumatoid arthritis- Patient has been off Cimzia due to concerns that this was causing his respiratory symptoms, his primary care provider has discussed this with Dr. Gomez, he states he still currently has pain from his rheumatoid arthritis in his hands and in his knees along with his back. Has been out of pain medication for the last 4 days and then as a primary complaint during this appointment today.    Eosinophilia- as mentioned above, patient had an allergy referral placed during his November hospital stay, referral has not been processed yet    Chronic pain- patient has pain management appointment which was originally scheduled for last Friday due to being hospitalized, states he would like to seek his pain management outside of this clinic and will call Medicaid to find out what providers he can establish with      Patient Active Problem List    Diagnosis Date Noted   • Reactive airway disease 11/02/2017     Priority: High   • COPD (chronic obstructive pulmonary disease) (CMS-[facility]) 12/30/2017   • Dehydration 12/29/2017   • Chronic pain of both shoulders  05/31/2017   • Chronic use of opiate for therapeutic purpose 02/13/2017   • Essential hypertension 09/22/2016   • Right knee pain 09/20/2016   • Headache, migraine 04/29/2016   • Fibromyalgia 04/29/2016   • Acquired hypothyroidism 11/12/2015   • Rheumatoid arthritis (CMS-[facility]) 07/07/2009   • GERD (gastroesophageal reflux disease) 07/07/2009   • Dyslipidemia 07/07/2009       Current Outpatient Prescriptions   Medication Sig Dispense Refill   • albuterol 108 (90 Base) MCG/ACT Aero Soln inhalation aerosol Inhale 2 Puffs by mouth every 6 hours as needed for Shortness of Breath. 8.5 g 3   • oxycodone-acetaminophen (PERCOCET-10)  MG Tab Take 1 Tab by mouth every 8 hours as needed for Severe Pain for up to 30 days. 90 Tab 0   • morphine ER (MS CONTIN) 30 MG Tab CR tablet Take 1 Tab by mouth every 12 hours for 30 days. 60 Tab 0   • acetaminophen/caffeine/butalbital 325-40-50 mg (FIORICET) -40 MG Tab Take 1 Tab by mouth every 6 hours as needed for Headache. 30 Tab 0   • budesonide-formoterol (SYMBICORT) 80-4.5 MCG/ACT Aerosol Inhale 2 Puffs by mouth 2 Times a Day. 1 Inhaler 1   • ranitidine (ZANTAC) 300 MG tablet Take 1 Tab by mouth 2 Times a Day. 180 Tab 0   • fluticasone (FLONASE) 50 MCG/ACT nasal spray Spray 1 Spray in nose 1 time daily as needed.     • loratadine (CLARITIN) 10 MG Tab Take 1 Tab by mouth every day. 30 Tab 1   • cyclobenzaprine (FLEXERIL) 10 MG Tab TAKE 1 TABLET BY MOUTH TWICE DAILY AS NEEDED FOR MODERATE PAIN OR  MUSCLE SPASMS 60 Tab 2   • levothyroxine (SYNTHROID) 150 MCG Tab TAKE ONE TABLET BY MOUTH EVERY MORNING ON EMPTY STOMACH 60 Tab 2   • azithromycin (ZITHROMAX) 250 MG Tab 250mg  tabs daily for 4 days. 4 Tab 0     No current facility-administered medications for this visit.            ROS: As per HPI. Additional pertinent symptoms as noted below.    Constitutional: no fevers or chills  Cardiovascular: no current chest pain or palpitations  Respiratory: no current shortness of breath  GI:  "no current abdominal pain, has had occasional loose stool  Musculo-skeletal: complains of back pain and knee pain along with hand pain  Skin: no rashes or swelling  Neurological: intermittent headaches  Psychological: no current changes    /82   Pulse 92   Temp 37.1 °C (98.7 °F)   Resp 16   Ht 1.778 m (5' 10\")   Wt 70.8 kg (156 lb)   SpO2 95%   BMI 22.38 kg/m²         Physical Exam   Constitutional:  56 y/o male who appears in no acute distress, pleasant and cooperative, somewhat unkempt   HEENT: NC/AT, EOMI  Cardiovascular: RRR, S1 and S2 physiologic, no S3, no murmur or rubs  Pulmonary/Chest: CTAB, no wheeze or crackles  Abdomen: Soft, non tender, no distension, no peritoneal signs  Musculoskeletal: Lower back pain to palpation, no noted ulnar deviation of wrists or finger, pain in right knee  Lymphadenopathy: no cervical adneopathy  Neurological: alert and oriented, no speech deficit  Skin: cracked dry skin over hands with dirt under nails      Assessment and Plan    1. Chronic use of opiate for therapeutic purpose  2. Fibromyalgia  3. Rheumatoid arthritis, involving unspecified site, unspecified rheumatoid factor presence (CMS-HCC)  Chronic pain recheck:   Last dose of controlled substance: 3 days ago  Chronic pain treated with MS contin/oxycodone taken 2-3 times a day  Current alcohol or substance use: no    Consequences of Chronic Opiate therapy:  (5 A's)  Analgesia:  significantly improved  Activity:  significantly improved  Adverse Events:  abnormal drug screen due to outside provider medictaion  Aberrant Behaviors: no inappropriate refills requested, lost or stolen meds reported.   Affect/Mood: normal speech pattern and content, normal thought patterns, normal perception, good insight, normal reasoning    Nonnarcotic treatments that are being used: flexeril    Pain management agreement initiated/updated and signed on: 11/28/2017  Urine drug screen done: 11/22/2017    Plan:  - Follow with  " Jason regarding his rheumatoid arthritis  - CONSENT FOR OPIATE PRESCRIPTION  - Refill Percocet prescription, refill MS Contin 30 mg twice a day,  aware ran with no accessory refills    4. Eosinophilia  5. Moderate persistent reactive airway disease without complication  - Patient does have a referral to an allergist Nils williamson but this referral has not been processed  -Since that message to referral coordinator to get this process moving  -will refill his albuterol inhaler      Patient is seen to fully recover from the influenza A infection. He will need to continue his azithromycin as previously prescribed to him by hospital provider. He did have schedule appointment for the fifth which I have asked him to cancel as he is seeing me today. He can otherwise follow up with Dr. Hogan in March.    Follow up: Return if symptoms worsen or fail to improve.    Signed by: Pedro Luis Martinez M.D.

## 2018-01-05 ENCOUNTER — APPOINTMENT (OUTPATIENT)
Dept: INTERNAL MEDICINE | Facility: MEDICAL CENTER | Age: 58
End: 2018-01-05
Payer: MEDICAID

## 2018-02-02 ENCOUNTER — OFFICE VISIT (OUTPATIENT)
Dept: INTERNAL MEDICINE | Facility: MEDICAL CENTER | Age: 58
End: 2018-02-02
Payer: MEDICAID

## 2018-02-02 VITALS
TEMPERATURE: 98.1 F | HEART RATE: 87 BPM | OXYGEN SATURATION: 96 % | BODY MASS INDEX: 23.12 KG/M2 | DIASTOLIC BLOOD PRESSURE: 94 MMHG | WEIGHT: 161.5 LBS | HEIGHT: 70 IN | SYSTOLIC BLOOD PRESSURE: 134 MMHG

## 2018-02-02 DIAGNOSIS — M25.512 CHRONIC PAIN OF BOTH SHOULDERS: ICD-10-CM

## 2018-02-02 DIAGNOSIS — M79.7 FIBROMYALGIA: ICD-10-CM

## 2018-02-02 DIAGNOSIS — Z79.891 CHRONIC USE OF OPIATE FOR THERAPEUTIC PURPOSE: ICD-10-CM

## 2018-02-02 DIAGNOSIS — M25.511 CHRONIC PAIN OF BOTH SHOULDERS: ICD-10-CM

## 2018-02-02 DIAGNOSIS — M05.79 RHEUMATOID ARTHRITIS INVOLVING MULTIPLE SITES WITH POSITIVE RHEUMATOID FACTOR (HCC): ICD-10-CM

## 2018-02-02 DIAGNOSIS — M25.561 CHRONIC PAIN OF RIGHT KNEE: ICD-10-CM

## 2018-02-02 DIAGNOSIS — G89.29 CHRONIC PAIN OF RIGHT KNEE: ICD-10-CM

## 2018-02-02 DIAGNOSIS — G89.29 CHRONIC PAIN OF BOTH SHOULDERS: ICD-10-CM

## 2018-02-02 PROCEDURE — 99214 OFFICE O/P EST MOD 30 MIN: CPT | Performed by: INTERNAL MEDICINE

## 2018-02-02 RX ORDER — MORPHINE SULFATE 30 MG/1
30 TABLET, FILM COATED, EXTENDED RELEASE ORAL EVERY 12 HOURS
Qty: 60 TAB | Refills: 0 | Status: SHIPPED | OUTPATIENT
Start: 2018-02-02 | End: 2018-03-02 | Stop reason: SDUPTHER

## 2018-02-02 RX ORDER — OXYCODONE AND ACETAMINOPHEN 10; 325 MG/1; MG/1
1 TABLET ORAL EVERY 8 HOURS PRN
Qty: 90 TAB | Refills: 0 | Status: SHIPPED | OUTPATIENT
Start: 2018-02-02 | End: 2018-03-02 | Stop reason: SDUPTHER
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
## 2018-02-03 NOTE — PATIENT INSTRUCTIONS
Please be ready to provide a random drug-screen, within 24 hours of being called.   Please consider getting Naltrexone (reversal agent) at a pharmacy, in case you shold feel very tired, or feel like you have taken too much (overdose).   Thank you.

## 2018-02-05 NOTE — TELEPHONE ENCOUNTER
Last seen: 02/02/18 by Dr. Coronado  Next appt: 03/02/18 with Dr. Coronado    Was the patient seen in the last year in this department? Yes   Does patient have an active prescription for medications requested? No   Received Request Via: Pharmacy

## 2018-02-08 RX ORDER — CYCLOBENZAPRINE HCL 10 MG
TABLET ORAL
Qty: 60 TAB | Refills: 0 | Status: SHIPPED | OUTPATIENT
Start: 2018-02-08 | End: 2018-04-12 | Stop reason: SDUPTHER

## 2018-02-23 ENCOUNTER — TELEPHONE (OUTPATIENT)
Dept: INTERNAL MEDICINE | Facility: MEDICAL CENTER | Age: 58
End: 2018-02-23

## 2018-02-23 NOTE — TELEPHONE ENCOUNTER
----- Message from Samson Coronado M.D. sent at 2/2/2018  4:51 PM PST -----  Regarding: random UDS  Call patient to come in for random UDS.

## 2018-02-23 NOTE — TELEPHONE ENCOUNTER
Called patient 2x Friday morning but did not leave VM due to sensitive nature of request  Therefore can call on Monday for patient to come in for random UDS

## 2018-03-02 ENCOUNTER — OFFICE VISIT (OUTPATIENT)
Dept: INTERNAL MEDICINE | Facility: MEDICAL CENTER | Age: 58
End: 2018-03-02
Payer: MEDICAID

## 2018-03-02 VITALS
DIASTOLIC BLOOD PRESSURE: 90 MMHG | HEART RATE: 80 BPM | BODY MASS INDEX: 23.11 KG/M2 | WEIGHT: 161.4 LBS | TEMPERATURE: 97.5 F | HEIGHT: 70 IN | SYSTOLIC BLOOD PRESSURE: 140 MMHG | OXYGEN SATURATION: 92 %

## 2018-03-02 DIAGNOSIS — M25.511 CHRONIC PAIN OF BOTH SHOULDERS: ICD-10-CM

## 2018-03-02 DIAGNOSIS — M25.512 CHRONIC PAIN OF BOTH SHOULDERS: ICD-10-CM

## 2018-03-02 DIAGNOSIS — M05.79 RHEUMATOID ARTHRITIS INVOLVING MULTIPLE SITES WITH POSITIVE RHEUMATOID FACTOR (HCC): ICD-10-CM

## 2018-03-02 DIAGNOSIS — M25.561 CHRONIC PAIN OF RIGHT KNEE: ICD-10-CM

## 2018-03-02 DIAGNOSIS — G89.29 CHRONIC PAIN OF BOTH SHOULDERS: ICD-10-CM

## 2018-03-02 DIAGNOSIS — G89.29 CHRONIC PAIN OF RIGHT KNEE: ICD-10-CM

## 2018-03-02 DIAGNOSIS — M79.7 FIBROMYALGIA: ICD-10-CM

## 2018-03-02 DIAGNOSIS — K21.9 GASTROESOPHAGEAL REFLUX DISEASE, ESOPHAGITIS PRESENCE NOT SPECIFIED: ICD-10-CM

## 2018-03-02 DIAGNOSIS — Z79.891 CHRONIC USE OF OPIATE FOR THERAPEUTIC PURPOSE: ICD-10-CM

## 2018-03-02 PROCEDURE — 99214 OFFICE O/P EST MOD 30 MIN: CPT | Mod: 25 | Performed by: INTERNAL MEDICINE

## 2018-03-02 PROCEDURE — 99000 SPECIMEN HANDLING OFFICE-LAB: CPT | Performed by: INTERNAL MEDICINE

## 2018-03-02 RX ORDER — MORPHINE SULFATE 30 MG/1
30 TABLET, FILM COATED, EXTENDED RELEASE ORAL EVERY 12 HOURS
Qty: 60 TAB | Refills: 0 | Status: SHIPPED | OUTPATIENT
Start: 2018-03-02 | End: 2018-03-30 | Stop reason: SDUPTHER

## 2018-03-02 RX ORDER — OXYCODONE AND ACETAMINOPHEN 10; 325 MG/1; MG/1
1 TABLET ORAL EVERY 8 HOURS PRN
Qty: 90 TAB | Refills: 0 | Status: SHIPPED | OUTPATIENT
Start: 2018-03-02 | End: 2018-03-30 | Stop reason: SDUPTHER

## 2018-03-02 RX ORDER — RANITIDINE 300 MG/1
TABLET ORAL
Qty: 180 TAB | Refills: 0 | Status: SHIPPED | OUTPATIENT
Start: 2018-03-02 | End: 2018-06-20 | Stop reason: SDUPTHER

## 2018-03-02 RX ORDER — LEVOTHYROXINE SODIUM 0.15 MG/1
TABLET ORAL
Qty: 90 TAB | Refills: 0 | Status: SHIPPED | OUTPATIENT
Start: 2018-03-02 | End: 2018-07-02 | Stop reason: SDUPTHER

## 2018-03-02 ASSESSMENT — PAIN SCALES - GENERAL: PAINLEVEL: 6=MODERATE PAIN

## 2018-03-02 NOTE — TELEPHONE ENCOUNTER
Last seen: 03/02/18 by Dr. Coronado  Next appt: 03/07/18 with Dr. Hogan    Was the patient seen in the last year in this department? Yes   Does patient have an active prescription for medications requested? No   Received Request Via: Pharmacy
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
## 2018-03-07 ENCOUNTER — APPOINTMENT (OUTPATIENT)
Dept: INTERNAL MEDICINE | Facility: MEDICAL CENTER | Age: 58
End: 2018-03-07
Payer: MEDICAID

## 2018-03-30 ENCOUNTER — OFFICE VISIT (OUTPATIENT)
Dept: INTERNAL MEDICINE | Facility: MEDICAL CENTER | Age: 58
End: 2018-03-30
Payer: MEDICAID

## 2018-03-30 VITALS
SYSTOLIC BLOOD PRESSURE: 120 MMHG | HEART RATE: 90 BPM | WEIGHT: 167.4 LBS | BODY MASS INDEX: 23.96 KG/M2 | DIASTOLIC BLOOD PRESSURE: 100 MMHG | TEMPERATURE: 97 F | OXYGEN SATURATION: 96 % | HEIGHT: 70 IN

## 2018-03-30 DIAGNOSIS — M25.511 CHRONIC PAIN OF BOTH SHOULDERS: ICD-10-CM

## 2018-03-30 DIAGNOSIS — M05.79 RHEUMATOID ARTHRITIS INVOLVING MULTIPLE SITES WITH POSITIVE RHEUMATOID FACTOR (HCC): ICD-10-CM

## 2018-03-30 DIAGNOSIS — G89.29 CHRONIC PAIN OF BOTH SHOULDERS: ICD-10-CM

## 2018-03-30 DIAGNOSIS — Z79.891 CHRONIC USE OF OPIATE FOR THERAPEUTIC PURPOSE: ICD-10-CM

## 2018-03-30 DIAGNOSIS — M25.512 CHRONIC PAIN OF BOTH SHOULDERS: ICD-10-CM

## 2018-03-30 PROCEDURE — 99214 OFFICE O/P EST MOD 30 MIN: CPT | Performed by: INTERNAL MEDICINE

## 2018-03-30 RX ORDER — METHYLPREDNISOLONE 4 MG/1
TABLET ORAL
Qty: 21 TAB | Refills: 0 | Status: SHIPPED | OUTPATIENT
Start: 2018-03-30 | End: 2018-04-27

## 2018-03-30 RX ORDER — MORPHINE SULFATE 30 MG/1
30 TABLET, FILM COATED, EXTENDED RELEASE ORAL EVERY 12 HOURS
Qty: 60 TAB | Refills: 0 | Status: SHIPPED | OUTPATIENT
Start: 2018-03-30 | End: 2018-04-27 | Stop reason: SDUPTHER

## 2018-03-30 RX ORDER — OXYCODONE AND ACETAMINOPHEN 10; 325 MG/1; MG/1
1 TABLET ORAL EVERY 8 HOURS PRN
Qty: 90 TAB | Refills: 0 | Status: SHIPPED | OUTPATIENT
Start: 2018-03-30 | End: 2018-04-29

## 2018-03-30 ASSESSMENT — PAIN SCALES - GENERAL: PAINLEVEL: 8=MODERATE-SEVERE PAIN

## 2018-03-30 NOTE — PROGRESS NOTES
Established Patient    Chrisotphe presents today with the following:    CC:   Chief Complaint   Patient presents with   • Follow-Up     opiate management       HPI: The patient is a pleasant 57 year old male established patient to the clinic presented with PMHx of Rheumatoid arthritis, fibromyalgia,COPD,Reactve airway disease,Hypothyroidism,GERD,DLD,Chronic pain of both shoulders is here for follow up visit.    -He reports he has chronic pain in his shoulders, knees and hands bilaterally.He has discontinued taking his Biologic medications for rheumatoid arthritis due to delay in his insurance paper work and is going to restart his medication for RA today.    Chronic pain recheck:   Last dose of controlled substance: This morning   Chronic pain treated with Ms contin 30 mg BID and percocet  mg TID   Current alcohol or substance use: yes, reports alcohol use.    Consequences of Chronic Opiate therapy:  (5 A's)  Analgesia:  improved  Activity:  improved  Adverse Events:  Denies Constipation,nausea,sedation from the use of opiates  Aberrant Behaviors: no inappropriate refills requested, lost or stolen meds reported.   Affect/Mood: normal speech pattern and content, Denies depression or anxiety    Nonnarcotic treatments that are being used: Biologic drug Cimizia     Pain management agreement initiated/updated and signed on: today to be scanned in   Urine drug screen done: 3/12/18, which was reviewed today and is consistent with prescribed medications.    I have reviewed the medical records, the Prescription Monitoring Program and I have determined that controlled substance treatment is medically indicated.      Patient Active Problem List    Diagnosis Date Noted   • Reactive airway disease 11/02/2017     Priority: High   • COPD (chronic obstructive pulmonary disease) (CMS-[facility]) 12/30/2017   • Dehydration 12/29/2017   • Chronic pain of both shoulders 05/31/2017   • Chronic use of opiate for therapeutic purpose  02/13/2017   • Essential hypertension 09/22/2016   • Right knee pain 09/20/2016   • Headache, migraine 04/29/2016   • Fibromyalgia 04/29/2016   • Acquired hypothyroidism 11/12/2015   • Rheumatoid arthritis (CMS-[facility]) 07/07/2009   • GERD (gastroesophageal reflux disease) 07/07/2009   • Dyslipidemia 07/07/2009       Current Outpatient Prescriptions   Medication Sig Dispense Refill   • oxyCODONE-acetaminophen (PERCOCET-10)  MG Tab Take 1 Tab by mouth every 8 hours as needed for Severe Pain for up to 30 days. 90 Tab 0   • morphine ER (MS CONTIN) 30 MG Tab CR tablet Take 1 Tab by mouth every 12 hours for 30 days. 60 Tab 0   • MethylPREDNISolone (MEDROL DOSEPAK) 4 MG Tablet Therapy Pack As directed on the packaging label. 21 Tab 0   • psyllium (METAMUCIL SMOOTH TEXTURE) 28 % packet Take 1 Packet by mouth 2 times a day. 100 Each 3   • levothyroxine (SYNTHROID) 150 MCG Tab TAKE ONE TABLET BY MOUTH EVERY MORNING ON EMPTY STOMACH 90 Tab 0   • ranitidine (ZANTAC) 300 MG tablet TAKE 1 TABLET BY MOUTH TWICE DAILY 180 Tab 0   • cyclobenzaprine (FLEXERIL) 10 MG Tab TAKE 1 TABLET BY MOUTH TWICE DAILY AS NEEDED FOR MODERATE PAIN OR  MUSCLE SPASMS 60 Tab 0   • Certolizumab Pegol (CIMZIA SC) Inject  as instructed. Every 2 weeks     • albuterol 108 (90 Base) MCG/ACT Aero Soln inhalation aerosol Inhale 2 Puffs by mouth every 6 hours as needed for Shortness of Breath. 8.5 g 3   • acetaminophen/caffeine/butalbital 325-40-50 mg (FIORICET) -40 MG Tab Take 1 Tab by mouth every 6 hours as needed for Headache. 30 Tab 0   • budesonide-formoterol (SYMBICORT) 80-4.5 MCG/ACT Aerosol Inhale 2 Puffs by mouth 2 Times a Day. 1 Inhaler 1   • fluticasone (FLONASE) 50 MCG/ACT nasal spray Spray 1 Spray in nose 1 time daily as needed.     • loratadine (CLARITIN) 10 MG Tab Take 1 Tab by mouth every day. 30 Tab 1     No current facility-administered medications for this visit.        ROS: As per HPI. Additional pertinent symptoms as noted  "below.    All others negative    /100   Pulse 90   Temp 36.1 °C (97 °F)   Ht 1.778 m (5' 10\")   Wt 75.9 kg (167 lb 6.4 oz)   SpO2 96%   BMI 24.02 kg/m²     Physical Exam   Constitutional:  oriented to person, place, and time. No distress.   Eyes: Pupils are equal, round, and reactive to light. No scleral icterus.  Neck: Neck supple. No thyromegaly present.   Cardiovascular: Normal rate, regular rhythm and normal heart sounds.  Exam reveals no gallop and no friction rub.  No murmur heard.  Pulmonary/Chest: Breath sounds normal. Chest wall is not dull to percussion.   Musculoskeletal:   no edema.   Lymphadenopathy: no cervical adenopathy  Neurological: alert and oriented to person, place, and time.   Skin: No cyanosis. Nails show no clubbing.  Deformity of the right hand 1st and second MCP, PIP and left hand MCP,DIP joints is present.  Synovial cyst at the base of the fifth digit of left hand is present.  Restricted ROM of all the major joints- Shoulder joints bilaterally, wrist joints, knee is noted.    Note: I have reviewed all pertinent labs and diagnostic tests associated with this visit with specific comments listed under the assessment and plan below    Assessment and Plan    1. Chronic use of opiate for therapeutic purpose  The patient is using chronic opiods for control of pain under my supervision. I have reviewed the most recent prescription monitoring profile on this patient and he/she  is in compliance with our clinic policy. The patients' activity and  functionality is good on opiate therapy and he/she is not manifesting any adverse effects. Analgesia is adeqaute, no aberrant behaviours are noted and affect is normal. Risks and benefits of being on an opiate have been discussed with the patient whom also has a current pain contract on file. I have reviewed below the patient's risk for substance abuse and obtained a score of  7 indicating Moderate risk     Opioid Risk Score: 7    Interpretation " of Opioid Risk Score   Score 0-3 = Low risk of abuse. Do UDS at least once per year.  Score 4-7 = Moderate risk of abuse. Do UDS 1-4 times per year.  Score 8+ = High risk of abuse. Refer to specialist.    -Most recent UDS was on 3/12 and is consistent with current medications.  -Pain consent is signed today.    2. Rheumatoid arthritis involving multiple sites with positive rheumatoid factor (CMS-[facility])  3. Chronic pain of both shoulders    -Patient has a history of rheumatoid arthritis involving multiple joints, with multiple deformities of bilateral MCP's,PIP joints.  -Currently patient has pain in most of his joints worse in his bilateral shoulders and worst joints.  -On exam: Consistent with findings supporting Rheumatoid arthritis  -Prescribed medrol dose Solo for Rheumatoid arthritis flare up.  -Patient will restart his biologic drug today   -Refill for MS contin and Percocet for pain management.    Followup: Return in about 4 weeks (around 4/27/2018).      Signed by: GERI KinseySAda

## 2018-04-12 DIAGNOSIS — G43.009 MIGRAINE WITHOUT AURA AND WITHOUT STATUS MIGRAINOSUS, NOT INTRACTABLE: ICD-10-CM

## 2018-04-13 NOTE — TELEPHONE ENCOUNTER
Last seen: 03/30/18 by Dr. Coronado  Next appt: 04/27/18 with Dr. Gentile    Was the patient seen in the last year in this department? Yes   Does patient have an active prescription for medications requested? No   Received Request Via: Pharmacy

## 2018-04-18 RX ORDER — BUTALBITAL, ACETAMINOPHEN AND CAFFEINE 50; 325; 40 MG/1; MG/1; MG/1
TABLET ORAL
Qty: 30 TAB | Refills: 0 | OUTPATIENT
Start: 2018-04-18 | End: 2018-06-20 | Stop reason: SDUPTHER

## 2018-04-18 RX ORDER — CYCLOBENZAPRINE HCL 10 MG
TABLET ORAL
Qty: 60 TAB | Refills: 0 | Status: SHIPPED | OUTPATIENT
Start: 2018-04-18 | End: 2018-06-03 | Stop reason: SDUPTHER

## 2018-04-27 ENCOUNTER — OFFICE VISIT (OUTPATIENT)
Dept: INTERNAL MEDICINE | Facility: MEDICAL CENTER | Age: 58
End: 2018-04-27
Payer: MEDICAID

## 2018-04-27 VITALS
HEIGHT: 70 IN | SYSTOLIC BLOOD PRESSURE: 146 MMHG | DIASTOLIC BLOOD PRESSURE: 90 MMHG | HEART RATE: 76 BPM | BODY MASS INDEX: 24.05 KG/M2 | OXYGEN SATURATION: 95 % | TEMPERATURE: 97.1 F | WEIGHT: 168 LBS

## 2018-04-27 DIAGNOSIS — M25.511 CHRONIC PAIN OF BOTH SHOULDERS: ICD-10-CM

## 2018-04-27 DIAGNOSIS — G43.009 MIGRAINE WITHOUT AURA AND WITHOUT STATUS MIGRAINOSUS, NOT INTRACTABLE: ICD-10-CM

## 2018-04-27 DIAGNOSIS — Z79.891 CHRONIC USE OF OPIATE FOR THERAPEUTIC PURPOSE: ICD-10-CM

## 2018-04-27 DIAGNOSIS — G89.29 CHRONIC PAIN OF BOTH SHOULDERS: ICD-10-CM

## 2018-04-27 DIAGNOSIS — I10 ESSENTIAL HYPERTENSION: ICD-10-CM

## 2018-04-27 DIAGNOSIS — M05.79 RHEUMATOID ARTHRITIS INVOLVING MULTIPLE SITES WITH POSITIVE RHEUMATOID FACTOR (HCC): ICD-10-CM

## 2018-04-27 DIAGNOSIS — M79.7 FIBROMYALGIA: ICD-10-CM

## 2018-04-27 DIAGNOSIS — M25.512 CHRONIC PAIN OF BOTH SHOULDERS: ICD-10-CM

## 2018-04-27 DIAGNOSIS — M25.561 CHRONIC PAIN OF RIGHT KNEE: ICD-10-CM

## 2018-04-27 DIAGNOSIS — G89.29 CHRONIC PAIN OF RIGHT KNEE: ICD-10-CM

## 2018-04-27 PROCEDURE — 99215 OFFICE O/P EST HI 40 MIN: CPT | Performed by: INTERNAL MEDICINE

## 2018-04-27 RX ORDER — LISINOPRIL 5 MG/1
5 TABLET ORAL DAILY
Qty: 30 TAB | Refills: 1 | Status: SHIPPED | OUTPATIENT
Start: 2018-04-27 | End: 2018-05-16

## 2018-04-27 RX ORDER — SUMATRIPTAN 25 MG/1
25 TABLET, FILM COATED ORAL DAILY
Qty: 10 TAB | Refills: 0 | Status: SHIPPED | OUTPATIENT
Start: 2018-04-27 | End: 2018-05-16

## 2018-04-27 RX ORDER — OXYCODONE AND ACETAMINOPHEN 7.5; 325 MG/1; MG/1
1 TABLET ORAL EVERY 8 HOURS PRN
Qty: 90 TAB | Refills: 0 | Status: SHIPPED | OUTPATIENT
Start: 2018-04-30 | End: 2018-05-30

## 2018-04-27 RX ORDER — DULOXETIN HYDROCHLORIDE 30 MG/1
30 CAPSULE, DELAYED RELEASE ORAL DAILY
Qty: 30 CAP | Refills: 1 | Status: SHIPPED | OUTPATIENT
Start: 2018-04-27 | End: 2018-05-16

## 2018-04-27 RX ORDER — MORPHINE SULFATE 30 MG/1
30 TABLET, FILM COATED, EXTENDED RELEASE ORAL EVERY 12 HOURS
Qty: 60 TAB | Refills: 0 | Status: SHIPPED | OUTPATIENT
Start: 2018-04-30 | End: 2018-05-30

## 2018-04-27 ASSESSMENT — PAIN SCALES - GENERAL: PAINLEVEL: 7=MODERATE-SEVERE PAIN
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
## 2018-04-27 NOTE — PATIENT INSTRUCTIONS
Make appointment to see your primary doctor in 6 weeks          Knee Pain  Knee pain is a very common symptom and can have many causes. Knee pain often goes away when you follow your health care provider's instructions for relieving pain and discomfort at home. However, knee pain can develop into a condition that needs treatment. Some conditions may include:  · Arthritis caused by wear and tear (osteoarthritis).  · Arthritis caused by swelling and irritation (rheumatoid arthritis or gout).  · A cyst or growth in your knee.  · An infection in your knee joint.  · An injury that will not heal.  · Damage, swelling, or irritation of the tissues that support your knee (torn ligaments or tendinitis).  If your knee pain continues, additional tests may be ordered to diagnose your condition. Tests may include X-rays or other imaging studies of your knee. You may also need to have fluid removed from your knee. Treatment for ongoing knee pain depends on the cause, but treatment may include:  · Medicines to relieve pain or swelling.  · Steroid injections in your knee.  · Physical therapy.  · Surgery.  HOME CARE INSTRUCTIONS  · Take medicines only as directed by your health care provider.  · Rest your knee and keep it raised (elevated) while you are resting.  · Do not do things that cause or worsen pain.  · Avoid high-impact activities or exercises, such as running, jumping rope, or doing jumping jacks.  · Apply ice to the knee area:  ¨ Put ice in a plastic bag.  ¨ Place a towel between your skin and the bag.  ¨ Leave the ice on for 20 minutes, 2-3 times a day.  · Ask your health care provider if you should wear an elastic knee support.  · Keep a pillow under your knee when you sleep.  · Lose weight if you are overweight. Extra weight can put pressure on your knee.  · Do not use any tobacco products, including cigarettes, chewing tobacco, or electronic cigarettes. If you need help quitting, ask your health care provider. Smoking  may slow the healing of any bone and joint problems that you may have.  SEEK MEDICAL CARE IF:  · Your knee pain continues, changes, or gets worse.  · You have a fever along with knee pain.  · Your knee cruz or locks up.  · Your knee becomes more swollen.  SEEK IMMEDIATE MEDICAL CARE IF:   · Your knee joint feels hot to the touch.  · You have chest pain or trouble breathing.  This information is not intended to replace advice given to you by your health care provider. Make sure you discuss any questions you have with your health care provider.  Document Released: 10/14/2008 Document Revised: 01/08/2016 Document Reviewed: 08/03/2015  [employer] Interactive Patient Education © 2017 [employer]

## 2018-05-03 ENCOUNTER — TELEPHONE (OUTPATIENT)
Dept: INTERNAL MEDICINE | Facility: MEDICAL CENTER | Age: 58
End: 2018-05-03

## 2018-05-03 NOTE — TELEPHONE ENCOUNTER
DOCUMENTATION OF PAR STATUS:    1. Name of Medication & Dose: oxycodone  7.5/325mg 1 tab po q8hrs prn severe pain 30days    2. Name of Prescription Coverage Company & phone #:  [employer] [phone]    3. Date Prior Auth Submitted: 05/03/18    4. What information was given to obtain insurance decision?     5. Prior Auth Letter Approved or Denied? Approved    6. Action Taken: Pharmacy/Patient Notified: Yes; Pharmacy via Fax

## 2018-05-16 ENCOUNTER — OFFICE VISIT (OUTPATIENT)
Dept: INTERNAL MEDICINE | Facility: MEDICAL CENTER | Age: 58
End: 2018-05-16
Payer: MEDICAID

## 2018-05-16 VITALS
HEIGHT: 70 IN | TEMPERATURE: 97.1 F | HEART RATE: 77 BPM | SYSTOLIC BLOOD PRESSURE: 140 MMHG | DIASTOLIC BLOOD PRESSURE: 100 MMHG | WEIGHT: 168 LBS | BODY MASS INDEX: 24.05 KG/M2 | OXYGEN SATURATION: 99 %

## 2018-05-16 DIAGNOSIS — G43.009 MIGRAINE WITHOUT AURA AND WITHOUT STATUS MIGRAINOSUS, NOT INTRACTABLE: ICD-10-CM

## 2018-05-16 DIAGNOSIS — I10 ESSENTIAL HYPERTENSION: ICD-10-CM

## 2018-05-16 DIAGNOSIS — J45.20 MILD INTERMITTENT REACTIVE AIRWAY DISEASE WITHOUT COMPLICATION: ICD-10-CM

## 2018-05-16 PROBLEM — E86.0 DEHYDRATION: Status: RESOLVED | Noted: 2017-12-29 | Resolved: 2018-05-16

## 2018-05-16 PROCEDURE — 99213 OFFICE O/P EST LOW 20 MIN: CPT | Mod: GE | Performed by: INTERNAL MEDICINE

## 2018-05-16 RX ORDER — LISINOPRIL 20 MG/1
20 TABLET ORAL DAILY
Qty: 90 TAB | Refills: 2 | Status: SHIPPED | OUTPATIENT
Start: 2018-05-16 | End: 2018-06-20

## 2018-05-16 RX ORDER — PROPRANOLOL HYDROCHLORIDE 40 MG/1
40 TABLET ORAL 2 TIMES DAILY
Qty: 120 TAB | Refills: 1 | Status: SHIPPED | OUTPATIENT
Start: 2018-05-16 | End: 2018-06-20

## 2018-05-16 ASSESSMENT — PATIENT HEALTH QUESTIONNAIRE - PHQ9: CLINICAL INTERPRETATION OF PHQ2 SCORE: 0

## 2018-05-16 NOTE — PROGRESS NOTES
Established Patient    Christophe presents today with the following:    CC: Routine follow-up    HPI:     58-year-old male presenting with:    Mild intermittent asthma: Apprised in the ever since patient's admission to the hospital in December for the flu his respiratory symptoms have been markedly improved. He is previously requiring regular Symbicort along with when necessary albuterol inhaler several times a week. Ever since his discharge from the hospital he is not uses Symbicort inhaler and has not required his albuterol inhaler either. He denies any shortness of breath, wheezing, dyspnea, cough.    Essential hypertension: Patient's blood pressure over the last several visits that have been slightly elevated. She currently is only on lisinopril 5 mg. Patient's elevated blood pressure could possibly be contributing to worsening migraine headaches. He denies any abdominal pain, blurry vision, chest pain.    Migraine without aura: Patient states he is mostly taking aspirin and Fioricet when needed for his headaches. He has previously tried Imitrex but this does not work for him and actually makes him more nauseated. His migraine headaches have been known to last for days and occur rather frequently throughout the month. He has never been tried on migraine prophylaxis per his recollection. He currently has an migraine free for 3 days.    Patient Active Problem List    Diagnosis Date Noted   • Reactive airway disease 11/02/2017     Priority: High   • Chronic pain of both shoulders 05/31/2017   • Chronic use of opiate for therapeutic purpose 02/13/2017   • Essential hypertension 09/22/2016   • Right knee pain 09/20/2016   • Headache, migraine 04/29/2016   • Fibromyalgia 04/29/2016   • Acquired hypothyroidism 11/12/2015   • Rheumatoid arthritis (HCC) 07/07/2009   • GERD (gastroesophageal reflux disease) 07/07/2009   • Dyslipidemia 07/07/2009       Current Outpatient Prescriptions   Medication Sig Dispense Refill   •  "lisinopril (PRINIVIL) 20 MG Tab Take 1 Tab by mouth every day. 90 Tab 2   • propranolol (INDERAL) 40 MG Tab Take 1 Tab by mouth 2 times a day. 120 Tab 1   • oxyCODONE-acetaminophen (PERCOCET) 7.5-325 MG per tablet Take 1 Tab by mouth every 8 hours as needed for Severe Pain for up to 30 days. 90 Tab 0   • morphine ER (MS CONTIN) 30 MG Tab CR tablet Take 1 Tab by mouth every 12 hours for 30 days. 60 Tab 0   • levothyroxine (SYNTHROID) 150 MCG Tab TAKE ONE TABLET BY MOUTH EVERY MORNING ON EMPTY STOMACH 90 Tab 0   • ranitidine (ZANTAC) 300 MG tablet TAKE 1 TABLET BY MOUTH TWICE DAILY 180 Tab 0   • CIMZIA PREFILLED 2 X 200 MG/ML Kit      • acetaminophen/caffeine/butalbital 325-40-50 mg (FIORICET) -40 MG Tab TAKE ONE TABLET BY MOUTH EVERY SIX HOURS AS NEEDED FOR HEADACHE 30 Tab 0   • cyclobenzaprine (FLEXERIL) 10 MG Tab TAKE 1 TABLET BY MOUTH TWICE DAILY AS NEEDED FOR MODERATE PAIN OR  MUSCLE SPASMS 60 Tab 0   • psyllium (METAMUCIL SMOOTH TEXTURE) 28 % packet Take 1 Packet by mouth 2 times a day. 100 Each 3   • albuterol 108 (90 Base) MCG/ACT Aero Soln inhalation aerosol Inhale 2 Puffs by mouth every 6 hours as needed for Shortness of Breath. 8.5 g 3   • fluticasone (FLONASE) 50 MCG/ACT nasal spray Spray 1 Spray in nose 1 time daily as needed.     • loratadine (CLARITIN) 10 MG Tab Take 1 Tab by mouth every day. 30 Tab 1     No current facility-administered medications for this visit.        ROS: As per HPI. Additional pertinent symptoms as noted below.    All others negative    /98   Pulse 77   Temp 36.2 °C (97.1 °F)   Ht 1.778 m (5' 10\")   Wt 76.2 kg (168 lb)   SpO2 99%   BMI 24.11 kg/m²     Physical Exam   Constitutional:  oriented to person, place, and time. No distress.   Cardiovascular: Normal rate, regular rhythm and normal heart sounds.  Exam reveals no gallop and no friction rub.  No murmur heard.  Pulmonary/Chest: Breath sounds normal. Chest wall is not dull to percussion.   Neurological: alert " and oriented to person, place, and time.       Note: I have reviewed all pertinent labs and diagnostic tests associated with this visit with specific comments listed under the assessment and plan below    Assessment and Plan    1. Mild intermittent reactive airway disease without complication  - Reactive airway disease has surprisingly improved since his hospitalization for influenza in December 2017  - States he has not been requiring his Symbicort inhaler and has not even used his albuterol inhaler since discharge  - Symbicort inhaler discontinued for the time being and patient advised to call the clinic if he does notice his respiratory status worsening    2. Essential hypertension  -During patient's last several visits patient's systolic blood pressure has been around 140 and diastolic in the high 90s  - He does have requested headaches/migraines which could potentially be worsened by his uncontrolled blood pressure  - Will increase his lisinopril from 5 mg to 20 mg  - Repeat BMP in 2 weeks after having started increased lisinopril dose    3. Migraine without aura and without status migrainosus, not intractable  - Patient has tried Imitrex in the past with no relief  - Patient takes aspirin rather frequently to control his medications. Patient advised about possible side effects such as abdominal pain, nausea, vomiting, black-colored stools with overuse of this medication. Patient advised to take this with meals.  - His migraines frequently last several days and occur at least once to twice a week and Fioricet is able to take the edge off  - He has not yet tried any migraine prophylaxis so will start propanolol 40 mg twice a day      Followup: Return in about 5 weeks (around 6/20/2018), or if symptoms worsen or fail to improve.      Signed by: Itz Hogan M.D.

## 2018-06-01 ENCOUNTER — OFFICE VISIT (OUTPATIENT)
Dept: INTERNAL MEDICINE | Facility: MEDICAL CENTER | Age: 58
End: 2018-06-01
Payer: MEDICAID

## 2018-06-01 VITALS
HEART RATE: 121 BPM | WEIGHT: 163 LBS | OXYGEN SATURATION: 96 % | BODY MASS INDEX: 23.34 KG/M2 | DIASTOLIC BLOOD PRESSURE: 84 MMHG | SYSTOLIC BLOOD PRESSURE: 122 MMHG | HEIGHT: 70 IN | TEMPERATURE: 97.7 F

## 2018-06-01 DIAGNOSIS — Z79.891 CHRONIC USE OF OPIATE FOR THERAPEUTIC PURPOSE: ICD-10-CM

## 2018-06-01 DIAGNOSIS — M25.561 CHRONIC PAIN OF RIGHT KNEE: ICD-10-CM

## 2018-06-01 DIAGNOSIS — K52.9 GASTROENTERITIS, ACUTE: ICD-10-CM

## 2018-06-01 DIAGNOSIS — G89.29 CHRONIC RIGHT SHOULDER PAIN: ICD-10-CM

## 2018-06-01 DIAGNOSIS — G89.29 CHRONIC PAIN OF RIGHT KNEE: ICD-10-CM

## 2018-06-01 DIAGNOSIS — M79.7 FIBROMYALGIA: ICD-10-CM

## 2018-06-01 DIAGNOSIS — M25.511 CHRONIC RIGHT SHOULDER PAIN: ICD-10-CM

## 2018-06-01 PROCEDURE — 99214 OFFICE O/P EST MOD 30 MIN: CPT | Performed by: INTERNAL MEDICINE

## 2018-06-01 RX ORDER — OXYCODONE AND ACETAMINOPHEN 7.5; 325 MG/1; MG/1
1 TABLET ORAL EVERY 8 HOURS PRN
Qty: 90 TAB | Refills: 0 | Status: SHIPPED | OUTPATIENT
Start: 2018-06-03 | End: 2018-06-29 | Stop reason: SDUPTHER

## 2018-06-01 RX ORDER — MORPHINE SULFATE 30 MG/1
TABLET, FILM COATED, EXTENDED RELEASE ORAL
COMMUNITY
Start: 2018-05-02 | End: 2018-06-01 | Stop reason: SDUPTHER

## 2018-06-01 RX ORDER — MORPHINE SULFATE 30 MG/1
30 TABLET, FILM COATED, EXTENDED RELEASE ORAL EVERY 12 HOURS
Qty: 60 TAB | Refills: 0 | Status: SHIPPED | OUTPATIENT
Start: 2018-06-01 | End: 2018-06-29 | Stop reason: SDUPTHER

## 2018-06-01 RX ORDER — OXYCODONE AND ACETAMINOPHEN 7.5; 325 MG/1; MG/1
TABLET ORAL
COMMUNITY
Start: 2018-05-04 | End: 2018-06-01 | Stop reason: SDUPTHER

## 2018-06-01 RX ORDER — LISINOPRIL 5 MG/1
TABLET ORAL
COMMUNITY
Start: 2018-04-27 | End: 2018-06-01

## 2018-06-01 RX ORDER — DULOXETIN HYDROCHLORIDE 30 MG/1
CAPSULE, DELAYED RELEASE ORAL
COMMUNITY
Start: 2018-04-27 | End: 2018-06-20

## 2018-06-01 ASSESSMENT — PAIN SCALES - GENERAL: PAINLEVEL: 8=MODERATE-SEVERE PAIN

## 2018-06-01 NOTE — PATIENT INSTRUCTIONS
Food Choices to Help Relieve Diarrhea, Adult  When you have diarrhea, the foods you eat and your eating habits are very important. Choosing the right foods and drinks can help relieve diarrhea. Also, because diarrhea can last up to 7 days, you need to replace lost fluids and electrolytes (such as sodium, potassium, and chloride) in order to help prevent dehydration.   WHAT GENERAL GUIDELINES DO I NEED TO FOLLOW?  · Slowly drink 1 cup (8 oz) of fluid for each episode of diarrhea. If you are getting enough fluid, your urine will be clear or pale yellow.  · Eat starchy foods. Some good choices include white rice, white toast, pasta, low-fiber cereal, baked potatoes (without the skin), saltine crackers, and bagels.  · Avoid large servings of any cooked vegetables.  · Limit fruit to two servings per day. A serving is ½ cup or 1 small piece.  · Choose foods with less than 2 g of fiber per serving.  · Limit fats to less than 8 tsp (38 g) per day.  · Avoid fried foods.  · Eat foods that have probiotics in them. Probiotics can be found in certain dairy products.  · Avoid foods and beverages that may increase the speed at which food moves through the stomach and intestines (gastrointestinal tract). Things to avoid include:  ¨ High-fiber foods, such as dried fruit, raw fruits and vegetables, nuts, seeds, and whole grain foods.  ¨ Spicy foods and high-fat foods.  ¨ Foods and beverages sweetened with high-fructose corn syrup, honey, or sugar alcohols such as xylitol, sorbitol, and mannitol.  WHAT FOODS ARE RECOMMENDED?  Grains  White rice. White, Cuban, or maximiliano breads (fresh or toasted), including plain rolls, buns, or bagels. White pasta. Saltine, soda, or kal crackers. Pretzels. Low-fiber cereal. Cooked cereals made with water (such as cornmeal, farina, or cream cereals). Plain muffins. Matzo. Padmini toast. Zwieback.   Vegetables  Potatoes (without the skin). Strained tomato and vegetable juices. Most well-cooked and canned  vegetables without seeds. Tender lettuce.  Fruits  Cooked or canned applesauce, apricots, cherries, fruit cocktail, grapefruit, peaches, pears, or plums. Fresh bananas, apples without skin, cherries, grapes, cantaloupe, grapefruit, peaches, oranges, or plums.   Meat and Other Protein Products  Baked or boiled chicken. Eggs. Tofu. Fish. Seafood. Smooth peanut butter. Ground or well-cooked tender beef, ham, veal, lamb, pork, or poultry.   Dairy  Plain yogurt, kefir, and unsweetened liquid yogurt. Lactose-free milk, buttermilk, or soy milk. Plain hard cheese.  Beverages  Sport drinks. Clear broths. Diluted fruit juices (except prune). Regular, caffeine-free sodas such as ginger ale. Water. Decaffeinated teas. Oral rehydration solutions. Sugar-free beverages not sweetened with sugar alcohols.  Other  Bouillon, broth, or soups made from recommended foods.   The items listed above may not be a complete list of recommended foods or beverages. Contact your dietitian for more options.  WHAT FOODS ARE NOT RECOMMENDED?  Grains  Whole grain, whole wheat, bran, or rye breads, rolls, pastas, crackers, and cereals. Wild or brown rice. Cereals that contain more than 2 g of fiber per serving. Corn tortillas or taco shells. Cooked or dry oatmeal. Granola. Popcorn.  Vegetables  Raw vegetables. Cabbage, broccoli, [place] sprouts, artichokes, baked beans, beet greens, corn, kale, legumes, peas, sweet potatoes, and yams. Potato skins. Cooked spinach and cabbage.  Fruits  Dried fruit, including raisins and dates. Raw fruits. Stewed or dried prunes. Fresh apples with skin, apricots, mangoes, pears, raspberries, and strawberries.   Meat and Other Protein Products  [place] peanut butter. Nuts and seeds. Beans and lentils. Kitchen.   Dairy  High-fat cheeses. Milk, chocolate milk, and beverages made with milk, such as milk shakes. Cream. Ice cream.  Sweets and Desserts  Sweet rolls, doughnuts, and sweet breads. Pancakes and waffles.  Fats and  Oils  Butter. Cream sauces. Margarine. Salad oils. Plain salad dressings. Olives. Avocados.   Beverages  Caffeinated beverages (such as coffee, tea, soda, or energy drinks). Alcoholic beverages. Fruit juices with pulp. Prune juice. Soft drinks sweetened with high-fructose corn syrup or sugar alcohols.  Other  Coconut. Hot sauce. Chili powder. Mayonnaise. Gravy. Cream-based or milk-based soups.   The items listed above may not be a complete list of foods and beverages to avoid. Contact your dietitian for more information.  WHAT SHOULD I DO IF I BECOME DEHYDRATED?  Diarrhea can sometimes lead to dehydration. Signs of dehydration include dark urine and dry mouth and skin. If you think you are dehydrated, you should rehydrate with an oral rehydration solution. These solutions can be purchased at pharmacies, retail stores, or online.   Drink ½-1 cup (120-240 mL) of oral rehydration solution each time you have an episode of diarrhea. If drinking this amount makes your diarrhea worse, try drinking smaller amounts more often. For example, drink 1-3 tsp (5-15 mL) every 5-10 minutes.   A general rule for staying hydrated is to drink 1½-2 L of fluid per day. Talk to your health care provider about the specific amount you should be drinking each day. Drink enough fluids to keep your urine clear or pale yellow.     This information is not intended to replace advice given to you by your health care provider. Make sure you discuss any questions you have with your health care provider.     Document Released: 03/09/2005 Document Revised: 01/08/2016 Document Reviewed: 11/10/2014  [employer] Interactive Patient Education ©2016 [employer]

## 2018-06-03 NOTE — PROGRESS NOTES
Established Patient    Christophe presents today with the following:    CC: f/u chronic pain and medication refill    HPI: 57 year old male with PMHx of Rheumatoid arthritis, fibromyalgia,Reactve airway disease,Hypothyroidism,GERD,DLD,Chronic pain of both shoulders is here for follow up chronic pain management.  Denies any restriction of movement and has full range of motion at shoulder and knee joint.   Patient is currently on biologic medications(cimzia) for rheumatoid arthritis.   He is a  and active, and with medications his function is better and quality of life is improved. Does not take other pain medications beside what is given to him.  No other illegal drugs, alcohol intake, or cigerette smoking.   Patient reports that he is compliant with his medications, does not exhibit aberrant or drug-seeking behavior. Patient denies side effects of chronic opiate use such as lightheadedness, excessive drowsiness, vomiting, trouble breathing, constipation.    Patient reports of ongoing diarrhea which started two days back. He ate his dinner which he warmed in the oven- ready made frozen packet. One hour after eating food he started to feel nauseous and abdominal pain with diarrhea ongoing multiple episodes until today. Symptoms have improved since this morning but still has been having watery diarrhea.Denies fever, vomiting, blood or mucous in the stools.        Chronic pain recheck:   Last dose of controlled substance: today  Chronic pain treated with MS Contin 30 mg PO 1 tab BID and percocet 7.5-325 1 tab PO TID.  Current alcohol or substance use: no     Consequences of Chronic Opiate therapy:  (5 A's)  Analgesia:  improved  Activity:  improved  Adverse Events:  None  Aberrant Behaviors: no inappropriate refills requested, lost or stolen meds reported.   Affect/Mood: good grooming     Nonnarcotic treatments that are being used: Cymbalta     Pain management agreement initiated/updated and signed on: 12/1/17  Urine  drug screen done: 3/12/18 and is consistent with prescribed medications.     I have reviewed the medical records, the Prescription Monitoring Program and I have determined that controlled substance treatment is medically indicated.     Patient Active Problem List    Diagnosis Date Noted   • Reactive airway disease 11/02/2017     Priority: High   • Chronic pain of both shoulders 05/31/2017   • Chronic use of opiate for therapeutic purpose 02/13/2017   • Essential hypertension 09/22/2016   • Right knee pain 09/20/2016   • Headache, migraine 04/29/2016   • Fibromyalgia 04/29/2016   • Acquired hypothyroidism 11/12/2015   • Rheumatoid arthritis (HCC) 07/07/2009   • GERD (gastroesophageal reflux disease) 07/07/2009   • Dyslipidemia 07/07/2009       Current Outpatient Prescriptions   Medication Sig Dispense Refill   • morphine ER (MS CONTIN) 30 MG Tab CR tablet Take 1 Tab by mouth every 12 hours for 30 days. 60 Tab 0   • oxyCODONE-acetaminophen (PERCOCET) 7.5-325 MG per tablet Take 1 Tab by mouth every 8 hours as needed for up to 30 days. 90 Tab 0   • CIMZIA PREFILLED 2 X 200 MG/ML Kit      • lisinopril (PRINIVIL) 20 MG Tab Take 1 Tab by mouth every day. 90 Tab 2   • propranolol (INDERAL) 40 MG Tab Take 1 Tab by mouth 2 times a day. 120 Tab 1   • acetaminophen/caffeine/butalbital 325-40-50 mg (FIORICET) -40 MG Tab TAKE ONE TABLET BY MOUTH EVERY SIX HOURS AS NEEDED FOR HEADACHE 30 Tab 0   • cyclobenzaprine (FLEXERIL) 10 MG Tab TAKE 1 TABLET BY MOUTH TWICE DAILY AS NEEDED FOR MODERATE PAIN OR  MUSCLE SPASMS 60 Tab 0   • psyllium (METAMUCIL SMOOTH TEXTURE) 28 % packet Take 1 Packet by mouth 2 times a day. 100 Each 3   • levothyroxine (SYNTHROID) 150 MCG Tab TAKE ONE TABLET BY MOUTH EVERY MORNING ON EMPTY STOMACH 90 Tab 0   • ranitidine (ZANTAC) 300 MG tablet TAKE 1 TABLET BY MOUTH TWICE DAILY 180 Tab 0   • albuterol 108 (90 Base) MCG/ACT Aero Soln inhalation aerosol Inhale 2 Puffs by mouth every 6 hours as needed for  "Shortness of Breath. 8.5 g 3   • fluticasone (FLONASE) 50 MCG/ACT nasal spray Spray 1 Spray in nose 1 time daily as needed.     • loratadine (CLARITIN) 10 MG Tab Take 1 Tab by mouth every day. 30 Tab 1   • DULoxetine (CYMBALTA) 30 MG Cap DR Particles        No current facility-administered medications for this visit.        Social History     Social History   • Marital status: Single     Spouse name: N/A   • Number of children: N/A   • Years of education: N/A     Occupational History   • Not on file.     Social History Main Topics   • Smoking status: Never Smoker   • Smokeless tobacco: Never Used   • Alcohol use 0.0 oz/week      Comment: rarely   • Drug use: No      Comment: METH AMPHETAMINE 20 YRS AGO   • Sexual activity: Yes     Partners: Female     Other Topics Concern   • Not on file     Social History Narrative   • No narrative on file       Family History   Problem Relation Age of Onset   • Heart Disease Father        ROS: As per HPI. Additional pertinent symptoms as noted below.  Negative as stated above.       /84   Pulse (!) 121   Temp 36.5 °C (97.7 °F)   Ht 1.778 m (5' 10\")   Wt 73.9 kg (163 lb)   SpO2 96%   BMI 23.39 kg/m²     Physical Exam  General:  Alert and oriented, No apparent distress.    Eyes: Pupils equal and reactive. No scleral icterus.    Throat: Clear no erythema or exudates noted.    Neck: Supple. No lymphadenopathy noted. Thyroid not enlarged.    Lungs: Clear to auscultation and percussion bilaterally.    Cardiovascular: tachycardia  And normal rhythm. No murmurs, rubs or gallops.    Abdomen:  Benign. No rebound or guarding noted.    Extremities: No clubbing, cyanosis, edema.    Skin: Clear. No rash or suspicious skin lesions noted.    Neurological: Oriented to time, place, and person .Cranial nerves intact. No motor/sensory deficits.Reflexes were normal and symmetrical in both upper and lower extremities     Musculoskeletal : NROM of all extremities. No tenderness or deformity " "noted.     Note: I have reviewed all pertinent labs and diagnostic tests associated with this visit with specific comments listed under the assessment and plan below      Assessment and Plan    1. Gastroenteritis, acute  - infectious etiology- viral vs staph aureus  - advised conservative management  - advised to drink plenty of fluids like gatorade to replace electrolytes  - advised to sip small amounts of fluids along with BRAT diet- rice, banana, apple sauce, toast  - as patient stated his symptoms are improving , will not order loperamide .     2. Chronic use of opiate for therapeutic purpose  On medications as stated below  Side effects  Include occasional constipations, and he takes laxatives but currently has ongoing diarrhea.  No other significant side effects.  Risk assessment done and patient educated on side effects. Currently no smoking, illegal drugs,  reviewed. No alcohol intake.  -continue medications as stated below.    3. Fibromyalgia  Has hx of fibromyalgia States he has pain all over his body, \"from head to toe\". States he sometimes get fatigued, but denies insomnia. Started on medication cymbalta last visit.    4. Chronic right shoulder pain  5. Chronic pain of right knee  Pain is well controlled with following medications and he functions well, with good quality of life  Has tried to see orthopedic surgeon but his current insurance not accepted.   Patient has good range of motion at shoulder joint. Has tried knee brace at home with minimal effect  ORT risk score moderate.   reviewed, UDS reviewed and all appropriate.  - oxyCODONE-acetaminophen (PERCOCET) 7.5-325 MG per tablet; Take 1 Tab by mouth every 8 hours as needed for Severe Pain for up to 30 days.  Dispense: 90 Tab; Refill: 0  - morphine ER (MS CONTIN) 30 MG Tab CR tablet; Take 1 Tab by mouth every 12 hours for 30 days.  Dispense: 60 Tab; Refill:0  - CONSENT FOR OPIATE PRESCRIPTION signed by the patient.     Follow up in 1 " month.    Signed by: Seth Soto M.D.

## 2018-06-04 NOTE — TELEPHONE ENCOUNTER
Patient Seen: 06/01/18 With Dr. Gentile  Next Appointment: 06/20/18 With Dr. CAYLA Hogan  Was the patient seen in the last year in this department? Yes     Does patient have an active prescription for medications requested? No     Received Request Via: Pharmacy

## 2018-06-06 RX ORDER — CYCLOBENZAPRINE HCL 10 MG
TABLET ORAL
Qty: 60 TAB | Refills: 0 | Status: SHIPPED | OUTPATIENT
Start: 2018-06-06 | End: 2018-08-01 | Stop reason: SDUPTHER

## 2018-06-20 ENCOUNTER — APPOINTMENT (OUTPATIENT)
Dept: INTERNAL MEDICINE | Facility: MEDICAL CENTER | Age: 58
End: 2018-06-20
Payer: MEDICAID

## 2018-06-20 ENCOUNTER — OFFICE VISIT (OUTPATIENT)
Dept: INTERNAL MEDICINE | Facility: MEDICAL CENTER | Age: 58
End: 2018-06-20
Payer: MEDICAID

## 2018-06-20 VITALS
BODY MASS INDEX: 23.19 KG/M2 | DIASTOLIC BLOOD PRESSURE: 88 MMHG | TEMPERATURE: 98.9 F | HEIGHT: 70 IN | HEART RATE: 68 BPM | SYSTOLIC BLOOD PRESSURE: 138 MMHG | WEIGHT: 162 LBS | OXYGEN SATURATION: 95 %

## 2018-06-20 DIAGNOSIS — K21.9 GASTROESOPHAGEAL REFLUX DISEASE, ESOPHAGITIS PRESENCE NOT SPECIFIED: ICD-10-CM

## 2018-06-20 DIAGNOSIS — G43.009 MIGRAINE WITHOUT AURA AND WITHOUT STATUS MIGRAINOSUS, NOT INTRACTABLE: ICD-10-CM

## 2018-06-20 DIAGNOSIS — Z12.11 ENCOUNTER FOR SCREENING COLONOSCOPY: ICD-10-CM

## 2018-06-20 DIAGNOSIS — I10 ESSENTIAL HYPERTENSION: ICD-10-CM

## 2018-06-20 DIAGNOSIS — E87.5 HYPERKALEMIA: ICD-10-CM

## 2018-06-20 DIAGNOSIS — N17.9 AKI (ACUTE KIDNEY INJURY) (HCC): ICD-10-CM

## 2018-06-20 PROCEDURE — 99214 OFFICE O/P EST MOD 30 MIN: CPT | Mod: GC | Performed by: INTERNAL MEDICINE

## 2018-06-20 RX ORDER — PROPRANOLOL HYDROCHLORIDE 40 MG/1
40 TABLET ORAL 2 TIMES DAILY
Qty: 120 TAB | Refills: 1 | Status: SHIPPED | OUTPATIENT
Start: 2018-06-20 | End: 2018-11-05

## 2018-06-20 RX ORDER — RANITIDINE 300 MG/1
TABLET ORAL
Qty: 180 TAB | Refills: 0 | Status: SHIPPED | OUTPATIENT
Start: 2018-06-20 | End: 2018-09-30 | Stop reason: SDUPTHER

## 2018-06-20 RX ORDER — BUTALBITAL, ACETAMINOPHEN AND CAFFEINE 50; 325; 40 MG/1; MG/1; MG/1
1 TABLET ORAL EVERY 6 HOURS PRN
Qty: 30 TAB | Refills: 1 | Status: SHIPPED | OUTPATIENT
Start: 2018-06-20 | End: 2018-07-20

## 2018-06-20 NOTE — PROGRESS NOTES
Established Patient    Christophe presents today with the following:    CC: ED follow up, stomach pain, HTN management    HPI: 58 year old male with a PMH of chronic pain, rheumatoid arthritis, daily headaches, GERD and HTN presents to the clinic for a clinic follow up for management of his chronic conditions.    Patient was seen on 5/16 and 6/1 in this office for management of his chronic pain, daily headaches and hypertension. He was initiated on lisinopril and propranolol on 5/16 for hypertension and migraine prophylaxis, respectively. He states that he did not take the lisinopril as he believes the elevation in his BP is secondary to being cut down on his narcotics and is upset about that stating that he will seek medical care elsewhere is his narcotics are titrated down again. He also stopped taking the propranolol for his headaches although he cannot remember why. He does admit to taking 4 pills of 325mg ASA daily for his chronic migraines. Patient was recently seen in the [facility] ED on 6/9/2018 with complaints of abdominal pain. During that encounter basic labs indicated a creatinine of 1.31 and a potassium of 5.2. He felt constipated, but a CT abd with contrast was negative for acute abdominal pathology.      Patient Active Problem List    Diagnosis Date Noted   • Reactive airway disease 11/02/2017     Priority: High   • Chronic pain of both shoulders 05/31/2017   • Chronic use of opiate for therapeutic purpose 02/13/2017   • Essential hypertension 09/22/2016   • Right knee pain 09/20/2016   • Headache, migraine 04/29/2016   • Fibromyalgia 04/29/2016   • Acquired hypothyroidism 11/12/2015   • Rheumatoid arthritis (HCC) 07/07/2009   • GERD (gastroesophageal reflux disease) 07/07/2009   • Dyslipidemia 07/07/2009       Current Outpatient Prescriptions   Medication Sig Dispense Refill   • propranolol (INDERAL) 40 MG Tab Take 1 Tab by mouth 2 times a day. 120 Tab 1   • acetaminophen/caffeine/butalbital 325-40-50 mg  "(FIORICET) -40 MG Tab Take 1 Tab by mouth every 6 hours as needed for up to 30 days. 30 Tab 1   • ranitidine (ZANTAC) 300 MG tablet TAKE 1 TABLET BY MOUTH TWICE DAILY 180 Tab 0   • cyclobenzaprine (FLEXERIL) 10 MG Tab TAKE 1 TABLET BY MOUTH TWICE DAILY AS NEEDED FOR MODERATE PAIN OR  MUSCLE SPASMS 60 Tab 0   • morphine ER (MS CONTIN) 30 MG Tab CR tablet Take 1 Tab by mouth every 12 hours for 30 days. 60 Tab 0   • oxyCODONE-acetaminophen (PERCOCET) 7.5-325 MG per tablet Take 1 Tab by mouth every 8 hours as needed for up to 30 days. 90 Tab 0   • CIMZIA PREFILLED 2 X 200 MG/ML Kit      • psyllium (METAMUCIL SMOOTH TEXTURE) 28 % packet Take 1 Packet by mouth 2 times a day. 100 Each 3   • levothyroxine (SYNTHROID) 150 MCG Tab TAKE ONE TABLET BY MOUTH EVERY MORNING ON EMPTY STOMACH 90 Tab 0   • loratadine (CLARITIN) 10 MG Tab Take 1 Tab by mouth every day. 30 Tab 1     No current facility-administered medications for this visit.        ROS: As per HPI. Additional pertinent symptoms as noted below.      /88   Pulse 68   Temp 37.2 °C (98.9 °F)   Ht 1.778 m (5' 10\")   Wt 73.5 kg (162 lb)   SpO2 95%   BMI 23.24 kg/m²     Physical Exam   Constitutional:  oriented to person, place, and time. No distress. Pressured speech. Mildly disheveled.   Eyes: Pupils are equal, round, and reactive to light. No scleral icterus.  Neck: Neck supple. No thyromegaly present.   Cardiovascular: Normal rate, regular rhythm and normal heart sounds.  Exam reveals no gallop and no friction rub.  No murmur heard.  Pulmonary/Chest: Breath sounds normal. Chest wall is not dull to percussion.   Musculoskeletal:   no edema.   Lymphadenopathy: no cervical adenopathy  Neurological: alert and oriented to person, place, and time.   Skin: No cyanosis. Nails show no clubbing.        Assessment and Plan    1. Migraine without aura and without status migrainosus  - Seen previously by Dr. Ley, neurology. Non-compliant with rec, cannot " remember the medication he was started on  - Take around 1000mg ASA daily for chronic headaches  - Stopped taking propranolol which was initiate don 5/16, but cannot remember why  - Convinced him to retry the propranolol. Instructed to take BID.  - Stop taking aspirin for now    2. Gastroesophageal reflux disease, esophagitis presence not specified  - Stop taking ASA  - Take ranitidine daily    3. Essential hypertension  - Hold lisinopril for now due to hyperkalemia  - Recheck labs in 7 days    4. RICK (acute kidney injury) (HCC)  - Dehydration vs NSAID induced  - Does have a history of rheumatoid arthritis  - Cr 1.31 on recent labs performed at [facility] on 6/9/2018  - Hold lisinopril, hold ASA  - Stay well hydrated  - Recheck kidney function in 7 days    5. Hyperkalemia  - K 5.2  - Hold lisnopril  - May have been 2.2 to kidney issue  - Recheck in 7 days    6. Encounter for screening colonoscopy  - Referral placed at todays visit      Signed by: Deonte Hernandez M.D.

## 2018-06-29 ENCOUNTER — OFFICE VISIT (OUTPATIENT)
Dept: INTERNAL MEDICINE | Facility: MEDICAL CENTER | Age: 58
End: 2018-06-29
Payer: MEDICAID

## 2018-06-29 VITALS
WEIGHT: 161 LBS | BODY MASS INDEX: 23.05 KG/M2 | TEMPERATURE: 97.8 F | SYSTOLIC BLOOD PRESSURE: 152 MMHG | DIASTOLIC BLOOD PRESSURE: 86 MMHG | HEART RATE: 70 BPM | OXYGEN SATURATION: 97 % | HEIGHT: 70 IN

## 2018-06-29 DIAGNOSIS — G89.29 CHRONIC PAIN OF RIGHT KNEE: ICD-10-CM

## 2018-06-29 DIAGNOSIS — M05.712 RHEUMATOID ARTHRITIS INVOLVING BOTH SHOULDERS WITH POSITIVE RHEUMATOID FACTOR (HCC): ICD-10-CM

## 2018-06-29 DIAGNOSIS — M25.532 WRIST PAIN, CHRONIC, LEFT: ICD-10-CM

## 2018-06-29 DIAGNOSIS — Z79.891 CHRONIC USE OF OPIATE FOR THERAPEUTIC PURPOSE: ICD-10-CM

## 2018-06-29 DIAGNOSIS — G89.29 WRIST PAIN, CHRONIC, LEFT: ICD-10-CM

## 2018-06-29 DIAGNOSIS — M25.561 CHRONIC PAIN OF RIGHT KNEE: ICD-10-CM

## 2018-06-29 DIAGNOSIS — M05.711 RHEUMATOID ARTHRITIS INVOLVING BOTH SHOULDERS WITH POSITIVE RHEUMATOID FACTOR (HCC): ICD-10-CM

## 2018-06-29 PROCEDURE — 99214 OFFICE O/P EST MOD 30 MIN: CPT | Performed by: INTERNAL MEDICINE

## 2018-06-29 RX ORDER — MORPHINE SULFATE 30 MG/1
30 TABLET, FILM COATED, EXTENDED RELEASE ORAL EVERY 12 HOURS
Qty: 60 TAB | Refills: 0 | Status: SHIPPED | OUTPATIENT
Start: 2018-07-03 | End: 2018-08-02

## 2018-06-29 RX ORDER — PREGABALIN 25 MG/1
25 CAPSULE ORAL 3 TIMES DAILY
Qty: 90 CAP | Refills: 0 | Status: SHIPPED | OUTPATIENT
Start: 2018-06-29 | End: 2018-07-29

## 2018-06-29 RX ORDER — OXYCODONE AND ACETAMINOPHEN 7.5; 325 MG/1; MG/1
1 TABLET ORAL EVERY 8 HOURS PRN
Qty: 85 TAB | Refills: 0 | Status: SHIPPED | OUTPATIENT
Start: 2018-07-03 | End: 2018-08-02

## 2018-06-29 RX ORDER — SULINDAC 150 MG/1
150 TABLET ORAL
Qty: 60 TAB | Refills: 0 | Status: SHIPPED | OUTPATIENT
Start: 2018-06-29 | End: 2018-11-05

## 2018-06-29 ASSESSMENT — PAIN SCALES - GENERAL: PAINLEVEL: 7=MODERATE-SEVERE PAIN

## 2018-06-29 NOTE — PATIENT INSTRUCTIONS
Chronic Pain, Adult  Chronic pain is a type of pain that lasts or keeps coming back (recurs) for at least six months. You may have chronic headaches, abdominal pain, or body pain. Chronic pain may be related to an illness, such as fibromyalgia or complex regional pain syndrome. Sometimes the cause of chronic pain is not known.  Chronic pain can make it hard for you to do daily activities. If not treated, chronic pain can lead to other health problems, including anxiety and depression. Treatment depends on the cause and severity of your pain. You may need to work with a pain specialist to come up with a treatment plan. The plan may include medicine, counseling, and physical therapy. Many people benefit from a combination of two or more types of treatment to control their pain.  Follow these instructions at home:  Lifestyle  · Consider keeping a pain diary to share with your health care providers.  · Consider talking with a mental health care provider (psychologist) about how to cope with chronic pain.  · Consider joining a chronic pain support group.  · Try to control or lower your stress levels. Talk to your health care provider about strategies to do this.  General instructions  · Take over-the-counter and prescription medicines only as told by your health care provider.  · Follow your treatment plan as told by your health care provider. This may include:  ¨ Gentle, regular exercise.  ¨ Eating a healthy diet that includes foods such as vegetables, fruits, fish, and lean meats.  ¨ Cognitive or behavioral therapy.  ¨ Working with a physical therapist.  ¨ Meditation or yoga.  ¨ Acupuncture or massage therapy.  ¨ Aroma, color, light, or sound therapy.  ¨ Local electrical stimulation.  ¨ Shots (injections) of numbing or pain-relieving medicines into the spine or the area of pain.  · Check your pain level as told by your health care provider. Ask your health care provider if you should use a pain scale.  · Learn as much  as you can about how to manage your chronic pain. Ask your health care provider if an intensive pain rehabilitation program or a chronic pain specialist would be helpful.  · Keep all follow-up visits as told by your health care provider. This is important.  Contact a health care provider if:  · Your pain gets worse.  · You have new pain.  · You have trouble sleeping.  · You have trouble doing your normal activities.  · Your pain is not controlled with treatment.  · Your have side effects from pain medicine.  · You feel weak.  Get help right away if:  · You lose feeling or have numbness in your body.  · You lose control of bowel or bladder function.  · Your pain suddenly gets much worse.  · You develop shaking or chills.  · You develop confusion.  · You develop chest pain.  · You have trouble breathing or shortness of breath.  · You pass out.  · You have thoughts about hurting yourself or others.  This information is not intended to replace advice given to you by your health care provider. Make sure you discuss any questions you have with your health care provider.  Document Released: 09/09/2003 Document Revised: 08/17/2017 Document Reviewed: 06/06/2017  [employer] Interactive Patient Education © 2017 [employer]

## 2018-06-29 NOTE — PROGRESS NOTES
date  Chief Complaint   Patient presents with   • Medication Refill     Percocet, morphine       HISTORY OF PRESENT ILLNESS: Patient is a 58 y.o. male established patient who presents today for the following.      1. Rheumatoid arthritis involving both shoulders with positive rheumatoid factor (HCC)  2. Wrist pain, chronic, left  3. Chronic use of opiate for therapeutic purpose  4. Chronic pain of right knee  Patient has chronic joint pain the kidney to his rheumatoid arthritis and also his job as a  and he needs to bend down and take care of his job that is physically demanding.  He has been taking CIMZiA injections for rheumatoid arthritis through his rheumatologist. Has been on high-dose morphine and Percocet for several years. States he needs his pain medication to be functional so he can get some work done. He denies having any constipation. Today he complains of left wrist pain with a slight skin abrasion secondary to his job. Denies having any direct injury to the wrist. States he is not interested in further tapering his pain medication secondary to his condition and the necessity for him secondary to his job. He also reassures me that he is not using any heavy equipment or machinery.  He also has chronic knee problems and had surgery done on his right knee in the past which has left with big scar tissue.    Chronic pain recheck:   Last dose of controlled substance: this AM morphine and percocet  Chronic pain treated with morphine 30 mg BID and percocet 7.5/325 taken 3 times a day  Current alcohol or substance use: no    Consequences of Chronic Opiate therapy:  (5 A's)  Analgesia:  improved  Activity:  improved  Adverse Events:  no constipation  Aberrant Behaviors: no inappropriate refills requested, lost or stolen meds reported.   Affect/Mood: full facial expressions, normal speech pattern and content, normal thought patterns, normal perception, good insight, normal reasoning    Nonnarcotic  treatments that are being used: flexeril.     ORT score-7    Pain management agreement initiated/updated and signed on: 11/2017  Urine drug screen done:3/12/2018, which was reviewed today and is consistent with prescribed medications.    I have reviewed the medical records, the Prescription Monitoring Program and I have determined that controlled substance treatment is medically indicated.      Past Medical History:   Diagnosis Date   • Fibromyalgia    • GERD (gastroesophageal reflux disease)    • Hypertension     medicated   • Hypothyroidism    • Rheumatoid arthritis(714.0)        Patient Active Problem List    Diagnosis Date Noted   • Reactive airway disease 11/02/2017     Priority: High   • Chronic pain of both shoulders 05/31/2017   • Chronic use of opiate for therapeutic purpose 02/13/2017   • Essential hypertension 09/22/2016   • Chronic pain of right knee 09/20/2016   • Headache, migraine 04/29/2016   • Fibromyalgia 04/29/2016   • Acquired hypothyroidism 11/12/2015   • Rheumatoid arthritis (HCC) 07/07/2009   • GERD (gastroesophageal reflux disease) 07/07/2009   • Dyslipidemia 07/07/2009       Allergies:Cymbalta [duloxetine hcl]    Current Outpatient Prescriptions   Medication Sig Dispense Refill   • pregabalin (LYRICA) 25 MG Cap Take 1 Cap by mouth 3 times a day for 30 days. 90 Cap 0   • sulindac (CLINORIL) 150 MG Tab Take 1 Tab by mouth 2 times daily with meals as needed. 60 Tab 0   • morphine ER (MS CONTIN) 30 MG Tab CR tablet Take 1 Tab by mouth every 12 hours for 30 days. 60 Tab 0   • oxyCODONE-acetaminophen (PERCOCET) 7.5-325 MG per tablet Take 1 Tab by mouth every 8 hours as needed for Severe Pain for up to 30 days. 85 Tab 0   • propranolol (INDERAL) 40 MG Tab Take 1 Tab by mouth 2 times a day. 120 Tab 1   • acetaminophen/caffeine/butalbital 325-40-50 mg (FIORICET) -40 MG Tab Take 1 Tab by mouth every 6 hours as needed for up to 30 days. 30 Tab 1   • ranitidine (ZANTAC) 300 MG tablet TAKE 1 TABLET  "BY MOUTH TWICE DAILY 180 Tab 0   • cyclobenzaprine (FLEXERIL) 10 MG Tab TAKE 1 TABLET BY MOUTH TWICE DAILY AS NEEDED FOR MODERATE PAIN OR  MUSCLE SPASMS 60 Tab 0   • CIMZIA PREFILLED 2 X 200 MG/ML Kit      • psyllium (METAMUCIL SMOOTH TEXTURE) 28 % packet Take 1 Packet by mouth 2 times a day. 100 Each 3   • loratadine (CLARITIN) 10 MG Tab Take 1 Tab by mouth every day. 30 Tab 1   • levothyroxine (SYNTHROID) 150 MCG Tab Take 1 Tab by mouth every morning before breakfast. 30 Tab 0     No current facility-administered medications for this visit.        Social History   Substance Use Topics   • Smoking status: Never Smoker   • Smokeless tobacco: Never Used   • Alcohol use 0.0 oz/week      Comment: rarely       Family History   Problem Relation Age of Onset   • Heart Disease Father          Review of Systems   Patient denies Fevers/chills/nausea/vomiting/chest pain/sob/blood in stools/black stools/blood in urine.all other systems are reviewed See HPI    Exam:  Blood pressure 152/86, pulse 70, temperature 36.6 °C (97.8 °F), height 1.778 m (5' 10\"), weight 73 kg (161 lb), SpO2 97 %. Body mass index is 23.1 kg/m².  Constitutional:  NAD, well appearing.  HEENT:   NC/AT  Cardiovascular: RRR.   No m/r/g. No carotid bruits.       Lungs:   CTAB, no w/r/r, no respiratory distress.  Abdomen: Soft, NT/ND + BS, no masses, no suprapubic tenderness, no hepatomegaly.  Extremities:  2+ DP and radial pulses bilaterally.  No c/c/e. Left wrist a 2 cm skin abrasion which is superficial on the volar aspect. Range of motion slightly limited due to tenderness in the wrist but there is no swelling on exam. He is able to walk without any difficulty and not needing any support. His right knee on exam shows a big scar from prior surgery.  Skin:  Warm and dry.    Neurologic: Alert & oriented x 3, CN II-XII grossly intact, strength and sensation grossly intact.  No focal deficits noted.  Psychiatric:  Affect normal, mood normal, judgment " normal.    Assessment/Plan:     1. Rheumatoid arthritis involving both shoulders with positive rheumatoid factor (HCC)  2. Wrist pain, chronic, left  3. Chronic use of opiate for therapeutic purpose  4. Chronic pain of right knee  Patient in chronic pain secondary to multiple reasons with rheumatoid arthritis and overuse syndrome likely from his job. He also had prior knee surgery and has been in chronic pain for many years. We will continue morphine 30 MG twice a day and Percocet 7.5/325 2-3 times a day but decrease the quantity of Percocet from 90 to 85 pills. Advised patient that he needs to take Percocet only as needed as its short-acting pain pill. Adding Lyrica to help him with his chronic pain overall in multiple areas and starting at low doses. We can titrate up slowly as tolerated. Patient insists that he has a lot of pain and needs help and so we discussed about maybe pain clinic to help him with injections and other modalities for pain control. Also adding sulindac itches and anti-inflammatory to help with his joint pains overall. We will follow up close if he does not get to see the pain specialist by next month or so  We will get a wrist x-ray to evaluate for his left wrist pain  - REFERRAL TO PAIN CLINIC  - sulindac (CLINORIL) 150 MG Tab; Take 1 Tab by mouth 2 times daily with meals as needed.  Dispense: 60 Tab; Refill: 0  - CONSENT FOR OPIATE PRESCRIPTION  - DX-WRIST-COMPLETE 3+ LEFT; Future  - REFERRAL TO PAIN CLINIC  - morphine ER (MS CONTIN) 30 MG Tab CR tablet; Take 1 Tab by mouth every 12 hours for 30 days.  Dispense: 60 Tab; Refill: 0  - oxyCODONE-acetaminophen (PERCOCET) 7.5-325 MG per tablet; Take 1 Tab by mouth every 8 hours as needed for Severe Pain for up to 30 days.  Dispense: 85 Tab; Refill: 0  - CONSENT FOR OPIATE PRESCRIPTION  - pregabalin (LYRICA) 25 MG Cap; Take 1 Cap by mouth 3 times a day for 30 days.  Dispense: 90 Cap; Refill: 0    All imaging results and lab results and consult  notes are reviewed at this visit.  Followup: Return in about 4 weeks (around 7/27/2018), or [facility].    Please note that this dictation was created using voice recognition software. I have made every reasonable attempt to correct obvious errors, but I expect that there are errors of grammar and possibly content that I did not discover before finalizing the note.

## 2018-07-02 RX ORDER — LEVOTHYROXINE SODIUM 0.15 MG/1
150 TABLET ORAL
Qty: 30 TAB | Refills: 0 | Status: SHIPPED | OUTPATIENT
Start: 2018-07-02 | End: 2018-07-31 | Stop reason: SDUPTHER

## 2018-07-02 NOTE — TELEPHONE ENCOUNTER
Last seen: 06/29/18 by Dr. Gentile  Next appt: 07/25/18 with Dr. Hernandez    Was the patient seen in the last year in this department? Yes   Does patient have an active prescription for medications requested? No   Received Request Via: Pharmacy

## 2018-07-03 ENCOUNTER — HOSPITAL ENCOUNTER (OUTPATIENT)
Dept: LAB | Facility: MEDICAL CENTER | Age: 58
End: 2018-07-03
Attending: INTERNAL MEDICINE
Payer: MEDICAID

## 2018-07-03 ENCOUNTER — HOSPITAL ENCOUNTER (OUTPATIENT)
Dept: RADIOLOGY | Facility: MEDICAL CENTER | Age: 58
End: 2018-07-03
Attending: INTERNAL MEDICINE
Payer: MEDICAID

## 2018-07-03 DIAGNOSIS — M25.532 WRIST PAIN, CHRONIC, LEFT: ICD-10-CM

## 2018-07-03 DIAGNOSIS — G89.29 WRIST PAIN, CHRONIC, LEFT: ICD-10-CM

## 2018-07-03 LAB
ALBUMIN SERPL BCP-MCNC: 4.3 G/DL (ref 3.2–4.9)
ALBUMIN/GLOB SERPL: 1.4 G/DL
ALP SERPL-CCNC: 56 U/L (ref 30–99)
ALT SERPL-CCNC: 11 U/L (ref 2–50)
ANION GAP SERPL CALC-SCNC: 9 MMOL/L (ref 0–11.9)
AST SERPL-CCNC: 15 U/L (ref 12–45)
BASOPHILS # BLD AUTO: 1 % (ref 0–1.8)
BASOPHILS # BLD: 0.05 K/UL (ref 0–0.12)
BILIRUB SERPL-MCNC: 0.4 MG/DL (ref 0.1–1.5)
BUN SERPL-MCNC: 16 MG/DL (ref 8–22)
CALCIUM SERPL-MCNC: 9.6 MG/DL (ref 8.5–10.5)
CHLORIDE SERPL-SCNC: 104 MMOL/L (ref 96–112)
CO2 SERPL-SCNC: 28 MMOL/L (ref 20–33)
CREAT SERPL-MCNC: 1.14 MG/DL (ref 0.5–1.4)
EOSINOPHIL # BLD AUTO: 0.78 K/UL (ref 0–0.51)
EOSINOPHIL NFR BLD: 15.4 % (ref 0–6.9)
ERYTHROCYTE [DISTWIDTH] IN BLOOD BY AUTOMATED COUNT: 44.9 FL (ref 35.9–50)
GLOBULIN SER CALC-MCNC: 3 G/DL (ref 1.9–3.5)
GLUCOSE SERPL-MCNC: 83 MG/DL (ref 65–99)
HCT VFR BLD AUTO: 41.8 % (ref 42–52)
HGB BLD-MCNC: 13.3 G/DL (ref 14–18)
IMM GRANULOCYTES # BLD AUTO: 0.01 K/UL (ref 0–0.11)
IMM GRANULOCYTES NFR BLD AUTO: 0.2 % (ref 0–0.9)
LYMPHOCYTES # BLD AUTO: 1.85 K/UL (ref 1–4.8)
LYMPHOCYTES NFR BLD: 36.5 % (ref 22–41)
MCH RBC QN AUTO: 28.2 PG (ref 27–33)
MCHC RBC AUTO-ENTMCNC: 31.8 G/DL (ref 33.7–35.3)
MCV RBC AUTO: 88.7 FL (ref 81.4–97.8)
MONOCYTES # BLD AUTO: 0.32 K/UL (ref 0–0.85)
MONOCYTES NFR BLD AUTO: 6.3 % (ref 0–13.4)
NEUTROPHILS # BLD AUTO: 2.06 K/UL (ref 1.82–7.42)
NEUTROPHILS NFR BLD: 40.6 % (ref 44–72)
NRBC # BLD AUTO: 0 K/UL
NRBC BLD-RTO: 0 /100 WBC
PLATELET # BLD AUTO: 280 K/UL (ref 164–446)
PMV BLD AUTO: 9.3 FL (ref 9–12.9)
POTASSIUM SERPL-SCNC: 4.5 MMOL/L (ref 3.6–5.5)
PROT SERPL-MCNC: 7.3 G/DL (ref 6–8.2)
RBC # BLD AUTO: 4.71 M/UL (ref 4.7–6.1)
SODIUM SERPL-SCNC: 141 MMOL/L (ref 135–145)
WBC # BLD AUTO: 5.1 K/UL (ref 4.8–10.8)

## 2018-07-03 PROCEDURE — 85025 COMPLETE CBC W/AUTO DIFF WBC: CPT

## 2018-07-03 PROCEDURE — 73110 X-RAY EXAM OF WRIST: CPT | Mod: LT

## 2018-07-03 PROCEDURE — 80053 COMPREHEN METABOLIC PANEL: CPT

## 2018-07-03 PROCEDURE — 36415 COLL VENOUS BLD VENIPUNCTURE: CPT

## 2018-07-25 ENCOUNTER — OFFICE VISIT (OUTPATIENT)
Dept: INTERNAL MEDICINE | Facility: MEDICAL CENTER | Age: 58
End: 2018-07-25
Payer: MEDICAID

## 2018-07-25 VITALS
TEMPERATURE: 96.9 F | HEART RATE: 77 BPM | BODY MASS INDEX: 23.05 KG/M2 | SYSTOLIC BLOOD PRESSURE: 124 MMHG | HEIGHT: 70 IN | RESPIRATION RATE: 16 BRPM | WEIGHT: 161 LBS | DIASTOLIC BLOOD PRESSURE: 82 MMHG | OXYGEN SATURATION: 99 %

## 2018-07-25 DIAGNOSIS — G43.009 MIGRAINE WITHOUT AURA AND WITHOUT STATUS MIGRAINOSUS, NOT INTRACTABLE: ICD-10-CM

## 2018-07-25 DIAGNOSIS — K59.00 CONSTIPATION, UNSPECIFIED CONSTIPATION TYPE: ICD-10-CM

## 2018-07-25 DIAGNOSIS — E03.9 ACQUIRED HYPOTHYROIDISM: ICD-10-CM

## 2018-07-25 PROCEDURE — 99214 OFFICE O/P EST MOD 30 MIN: CPT | Mod: GC | Performed by: INTERNAL MEDICINE

## 2018-07-25 RX ORDER — SULINDAC 200 MG/1
TABLET ORAL
COMMUNITY
Start: 2018-06-29 | End: 2018-07-25

## 2018-07-25 RX ORDER — BUTALBITAL, ACETAMINOPHEN AND CAFFEINE 300; 40; 50 MG/1; MG/1; MG/1
1 CAPSULE ORAL EVERY 4 HOURS PRN
Qty: 30 CAP | Refills: 1 | Status: SHIPPED | OUTPATIENT
Start: 2018-07-25 | End: 2018-08-24

## 2018-07-25 RX ORDER — KETOROLAC TROMETHAMINE 30 MG/ML
INJECTION, SOLUTION INTRAMUSCULAR; INTRAVENOUS
COMMUNITY
Start: 2018-06-09 | End: 2018-07-25

## 2018-07-25 RX ORDER — DOCUSATE SODIUM 100 MG/1
100 CAPSULE, LIQUID FILLED ORAL 2 TIMES DAILY
Qty: 60 CAP | Refills: 3 | Status: SHIPPED | OUTPATIENT
Start: 2018-07-25 | End: 2019-08-20

## 2018-07-25 RX ORDER — POLYETHYLENE GLYCOL 3350 17 G/17G
17 POWDER, FOR SOLUTION ORAL
Qty: 30 BOTTLE | Refills: 3 | Status: SHIPPED | OUTPATIENT
Start: 2018-07-25 | End: 2018-11-05

## 2018-07-26 NOTE — PROGRESS NOTES
New Patient to Establish    Reason to establish: PCP switch    CC: Establish care, mgmt migraines, hypothyroid    HPI: 58 year old male with a PMH of chronic pain, rheumatoid arthritis, migraines, GERD and hypothyroid presents to the clinic as a new patient to re-establish care with myself and management of his chronic conditions.    Migraines: Pt was seen previously by Dr. Ley, neurology and non-compliant with recommendations as he was unhappy with the care. He cannot remember the medication he was started on and does not remember why he stopped it. He would like a refill of sumatriptan and is taking it quite frequently up to every other day, but he states that most days he has at least a low grade headache. He has been non-compliant with recently prescribed propranolol, but is agreeable to try this medication again.     Constipation: Induced by chronic narcotics. He states that sometimes he will go a day or two without a bowel movement, and is not on a bowel regimen at this time.    Health maintenance: Colonoscopy was ordered and he was able to set up an appt next month.      Patient Active Problem List    Diagnosis Date Noted   • Reactive airway disease 11/02/2017     Priority: High   • Constipation 07/25/2018   • Chronic pain of both shoulders 05/31/2017   • Chronic use of opiate for therapeutic purpose 02/13/2017   • Chronic pain of right knee 09/20/2016   • Headache, migraine 04/29/2016   • Fibromyalgia 04/29/2016   • Acquired hypothyroidism 11/12/2015   • Rheumatoid arthritis (HCC) 07/07/2009   • GERD (gastroesophageal reflux disease) 07/07/2009   • Dyslipidemia 07/07/2009       Past Medical History:   Diagnosis Date   • Fibromyalgia    • GERD (gastroesophageal reflux disease)    • Hypertension     medicated   • Hypothyroidism    • Rheumatoid arthritis(714.0)        Current Outpatient Prescriptions   Medication Sig Dispense Refill   • docusate sodium (COLACE) 100 MG Cap Take 1 Cap by mouth 2 times a day.  60 Cap 3   • polyethylene glycol 3350 (MIRALAX) Powder Take 17 g by mouth 1 time daily as needed. 30 Bottle 3   • acetaminophen/caffeine/butalbital 300-40-50 mg (FIORICET) -40 MG Cap capsule Take 1 Cap by mouth every four hours as needed for Headache for up to 30 days. 30 Cap 1   • levothyroxine (SYNTHROID) 150 MCG Tab Take 1 Tab by mouth every morning before breakfast. 30 Tab 0   • pregabalin (LYRICA) 25 MG Cap Take 1 Cap by mouth 3 times a day for 30 days. 90 Cap 0   • sulindac (CLINORIL) 150 MG Tab Take 1 Tab by mouth 2 times daily with meals as needed. 60 Tab 0   • morphine ER (MS CONTIN) 30 MG Tab CR tablet Take 1 Tab by mouth every 12 hours for 30 days. 60 Tab 0   • oxyCODONE-acetaminophen (PERCOCET) 7.5-325 MG per tablet Take 1 Tab by mouth every 8 hours as needed for Severe Pain for up to 30 days. 85 Tab 0   • propranolol (INDERAL) 40 MG Tab Take 1 Tab by mouth 2 times a day. 120 Tab 1   • ranitidine (ZANTAC) 300 MG tablet TAKE 1 TABLET BY MOUTH TWICE DAILY 180 Tab 0   • cyclobenzaprine (FLEXERIL) 10 MG Tab TAKE 1 TABLET BY MOUTH TWICE DAILY AS NEEDED FOR MODERATE PAIN OR  MUSCLE SPASMS 60 Tab 0   • CIMZIA PREFILLED 2 X 200 MG/ML Kit      • loratadine (CLARITIN) 10 MG Tab Take 1 Tab by mouth every day. 30 Tab 1     No current facility-administered medications for this visit.        Allergies as of 07/25/2018 - Reviewed 07/25/2018   Allergen Reaction Noted   • Cymbalta [duloxetine hcl]  05/16/2018       Social History     Social History   • Marital status: Single     Spouse name: N/A   • Number of children: N/A   • Years of education: N/A     Occupational History   • Not on file.     Social History Main Topics   • Smoking status: Never Smoker   • Smokeless tobacco: Never Used   • Alcohol use 0.0 oz/week      Comment: rarely   • Drug use: No      Comment: METH AMPHETAMINE 20 YRS AGO   • Sexual activity: Yes     Partners: Female     Other Topics Concern   • Not on file     Social History Narrative   • No  "narrative on file       Family History   Problem Relation Age of Onset   • Heart Disease Father        Past Surgical History:   Procedure Laterality Date   • KNEE ARTHROPLASTY TOTAL  2013    right   • OTHER ORTHOPEDIC SURGERY      knees, elbow, wrist       ROS: As per HPI. Additional pertinent symptoms as noted below.    Constitutional: Denies weakness, fever, chills  Reports fatigue  Eyes: Denies blurry vision, vision loss  ENT: Denies hearing loss, rhinorrhea, odynophagia, dysphagia  Cardiovascular: Denies hypertension, murmur, angina, edema  Respiratory: Denies shortness of breath, wheeze, cough  GI: Denies change in appetite, nausea, vomiting, diarrhea, constipation, blood in stool, abdominal pain  : Denies frequency, hesitancy, urgency, polyuria, dysuria, hematuria, nocturia, incontinence, stones  Musculoskeletal: Denies weakness, myalgia, redness, arthritis  Reports joint pain and swelling  Skin: Denies rash, sores, jaundice, lumps, itching, ecchymosis  Neurological: Denies loss of sensation, numbness, weakness  Psychological: Denies mood changes, anxiety, depression, memory difficulty      /82   Pulse 77   Temp 36.1 °C (96.9 °F)   Resp 16   Ht 1.778 m (5' 10\")   Wt 73 kg (161 lb)   SpO2 99%   BMI 23.10 kg/m²     Physical Exam  Constitutional:  oriented to person, place, and time. No distress. Mildly disheveled in work clothes.   Eyes: Pupils are equal, round, and reactive to light. No scleral icterus.  Neck: Neck supple. No thyromegaly present.   Cardiovascular: Normal rate, regular rhythm and normal heart sounds.  Exam reveals no gallop and no friction rub.  No murmur heard.  Pulmonary/Chest: Breath sounds normal. Chest wall is not dull to percussion.   Musculoskeletal:   no edema.   Lymphadenopathy: no cervical adenopathy  Neurological: alert and oriented to person, place, and time.   Skin: No cyanosis. Nails show no clubbing.         Assessment and Plan    1. Migraine without aura and " without status migrainosus  - Seen previously by Dr. Ley, neurology. Non-compliant with rec, cannot remember the medication  - Take around 1000mg ASA daily for chronic headaches  - Stopped taking propranolol which was initiate don 5/16, but cannot remember why  - Convinced him to retry the propranolol at last visit, but has not started. Instructed to take BID.  - Stop taking aspirin for now  - Refill for sumatriptan     2. Gastroesophageal reflux disease, esophagitis presence not specified  - Stop taking ASA  - Take ranitidine daily     3. Essential hypertension - Resolved  - normotensive at todays visit  - D/C lisinopril  - Likely elevated as patient in pain     4. Constipation  - Bowel regimen with colace, miralax    5. Acquired hypothyroid  - TSH 24.27 on 11/2017  - Patient complains of fatigue. Recheck labs.    6. Preventive care  TD- 8/2009  TDaP - 5/2016  Pneumococcal - 11/2015  Prevnar - Not recieved  Colonoscopy - Ordered, pending. Appt next month  Zostavax- Not of age      Risk Assessment (discuss potential complications a function of chronic problems): High risk    Complexity (discuss number of co-morbidities): 5    Signed by: Deonte Hernandez M.D.

## 2018-07-27 ENCOUNTER — APPOINTMENT (OUTPATIENT)
Dept: INTERNAL MEDICINE | Facility: MEDICAL CENTER | Age: 58
End: 2018-07-27
Payer: MEDICAID

## 2018-08-01 RX ORDER — CYCLOBENZAPRINE HCL 10 MG
TABLET ORAL
Qty: 60 TAB | Refills: 0 | Status: SHIPPED | OUTPATIENT
Start: 2018-08-01 | End: 2018-09-30 | Stop reason: SDUPTHER

## 2018-08-01 NOTE — TELEPHONE ENCOUNTER
Last seen: 07/25/18 by Dr. Hernandez  Next appt: 11/05/18 with Dr. Hernandez    Was the patient seen in the last year in this department? Yes   Does patient have an active prescription for medications requested? No   Received Request Via: Pharmacy

## 2018-08-08 ENCOUNTER — HOSPITAL ENCOUNTER (OUTPATIENT)
Dept: RADIOLOGY | Facility: MEDICAL CENTER | Age: 58
End: 2018-08-08
Attending: PHYSICAL MEDICINE & REHABILITATION
Payer: MEDICAID

## 2018-08-08 DIAGNOSIS — M25.561 RIGHT KNEE PAIN, UNSPECIFIED CHRONICITY: ICD-10-CM

## 2018-08-08 DIAGNOSIS — M25.511 RIGHT SHOULDER PAIN, UNSPECIFIED CHRONICITY: ICD-10-CM

## 2018-08-08 PROCEDURE — 73030 X-RAY EXAM OF SHOULDER: CPT | Mod: RT

## 2018-08-08 PROCEDURE — 73562 X-RAY EXAM OF KNEE 3: CPT | Mod: RT

## 2018-11-05 ENCOUNTER — OFFICE VISIT (OUTPATIENT)
Dept: INTERNAL MEDICINE | Facility: MEDICAL CENTER | Age: 58
End: 2018-11-05
Payer: MEDICAID

## 2018-11-05 VITALS
TEMPERATURE: 98.2 F | BODY MASS INDEX: 23.48 KG/M2 | HEIGHT: 70 IN | HEART RATE: 83 BPM | OXYGEN SATURATION: 98 % | DIASTOLIC BLOOD PRESSURE: 90 MMHG | SYSTOLIC BLOOD PRESSURE: 136 MMHG | WEIGHT: 164 LBS | RESPIRATION RATE: 17 BRPM

## 2018-11-05 DIAGNOSIS — K21.9 GASTROESOPHAGEAL REFLUX DISEASE, ESOPHAGITIS PRESENCE NOT SPECIFIED: ICD-10-CM

## 2018-11-05 DIAGNOSIS — E03.9 ACQUIRED HYPOTHYROIDISM: ICD-10-CM

## 2018-11-05 DIAGNOSIS — M05.712 RHEUMATOID ARTHRITIS INVOLVING BOTH SHOULDERS WITH POSITIVE RHEUMATOID FACTOR (HCC): ICD-10-CM

## 2018-11-05 DIAGNOSIS — K59.00 CONSTIPATION, UNSPECIFIED CONSTIPATION TYPE: ICD-10-CM

## 2018-11-05 DIAGNOSIS — G43.009 MIGRAINE WITHOUT AURA AND WITHOUT STATUS MIGRAINOSUS, NOT INTRACTABLE: ICD-10-CM

## 2018-11-05 DIAGNOSIS — J30.1 ALLERGIC RHINITIS DUE TO POLLEN, UNSPECIFIED SEASONALITY: ICD-10-CM

## 2018-11-05 DIAGNOSIS — Z23 NEED FOR INFLUENZA VACCINATION: ICD-10-CM

## 2018-11-05 DIAGNOSIS — M05.711 RHEUMATOID ARTHRITIS INVOLVING BOTH SHOULDERS WITH POSITIVE RHEUMATOID FACTOR (HCC): ICD-10-CM

## 2018-11-05 PROCEDURE — 99214 OFFICE O/P EST MOD 30 MIN: CPT | Mod: GC,25 | Performed by: INTERNAL MEDICINE

## 2018-11-05 PROCEDURE — 90686 IIV4 VACC NO PRSV 0.5 ML IM: CPT | Performed by: INTERNAL MEDICINE

## 2018-11-05 PROCEDURE — 90471 IMMUNIZATION ADMIN: CPT | Performed by: INTERNAL MEDICINE

## 2018-11-05 RX ORDER — BUTALBITAL, ACETAMINOPHEN AND CAFFEINE 300; 40; 50 MG/1; MG/1; MG/1
1 CAPSULE ORAL EVERY 6 HOURS PRN
Qty: 84 CAP | Refills: 0 | Status: SHIPPED | OUTPATIENT
Start: 2018-11-05 | End: 2018-12-05

## 2018-11-05 RX ORDER — PROPRANOLOL HYDROCHLORIDE 40 MG/1
40 TABLET ORAL 2 TIMES DAILY
Qty: 60 TAB | Refills: 3 | Status: SHIPPED | OUTPATIENT
Start: 2018-11-05 | End: 2019-01-28 | Stop reason: SDUPTHER

## 2018-11-05 RX ORDER — POLYETHYLENE GLYCOL-3350 AND ELECTROLYTES 236; 6.74; 5.86; 2.97; 22.74 G/274.31G; G/274.31G; G/274.31G; G/274.31G; G/274.31G
POWDER, FOR SOLUTION ORAL
COMMUNITY
Start: 2018-09-21 | End: 2018-11-05

## 2018-11-05 RX ORDER — OXYCODONE AND ACETAMINOPHEN 7.5; 325 MG/1; MG/1
TABLET ORAL
COMMUNITY
Start: 2018-10-31 | End: 2019-02-18

## 2018-11-05 RX ORDER — BUTALBITAL, ACETAMINOPHEN AND CAFFEINE 300; 40; 50 MG/1; MG/1; MG/1
CAPSULE ORAL
COMMUNITY
Start: 2018-10-31 | End: 2018-11-05 | Stop reason: SDUPTHER

## 2018-11-05 RX ORDER — MORPHINE SULFATE 30 MG/1
30 TABLET, FILM COATED, EXTENDED RELEASE ORAL EVERY 12 HOURS
COMMUNITY

## 2018-11-05 RX ORDER — SULINDAC 150 MG/1
150 TABLET ORAL 2 TIMES DAILY
Qty: 60 TAB | Refills: 3 | Status: SHIPPED | OUTPATIENT
Start: 2018-11-05 | End: 2019-08-20

## 2018-11-05 RX ORDER — ESOMEPRAZOLE MAGNESIUM 20 MG
CAPSULE,DELAYED RELEASE (ENTERIC COATED) ORAL
COMMUNITY
Start: 2018-09-21 | End: 2018-11-05

## 2018-11-05 RX ORDER — LORATADINE 10 MG/1
10 TABLET ORAL DAILY
Qty: 30 TAB | Refills: 3 | Status: SHIPPED | OUTPATIENT
Start: 2018-11-05 | End: 2019-02-18

## 2018-11-05 NOTE — PROGRESS NOTES
Established Patient    Christophe presents today with the following:    CC: Management of chronic conditions, influenza vaccine    HPI: 58 year old male with a PMH of chronic joint pain and fibromyalgia, rheumatoid arthritis, migraines, GERD and hypothyroid presents to the clinic for management of his chronic conditions.     Rheumatoid arthritis: Managed by Dr. Gomez, missed his last appt and has been unable to obtain refills of his sulindac. He is compliant with injected cimzia. Disease is at baseline with no recent exacerbations.     Chronic pain: Managed by pain management. On a regimen of long acting morphine, oxycodone and lyrica for pain associated with rheumatoid and fibromyalgia. Pain is controlled, no issues.    Migraines: Pt was seen previously by Dr. Ley, neurology and non-compliant with recommendations. He would like a refill of fioricet and is taking it quite frequently up to every other day, but he states that most days he has at least a low grade headache. He has been non-compliant with recently prescribed propranolol, but is agreeable to try this medication again today.     Constipation: Induced by chronic narcotics. Controlled on colace only. He states that he still has miralax, but has not needed this medication.     Health maintenance: He was able to get his colonoscopy this month which showed 1 polyp and further recs to repeat colonscopy in 5 years. Gave influenza injection today.    Patient Active Problem List    Diagnosis Date Noted   • Reactive airway disease 11/02/2017     Priority: High   • Constipation 07/25/2018   • Chronic pain of both shoulders 05/31/2017   • Chronic use of opiate for therapeutic purpose 02/13/2017   • Chronic pain of right knee 09/20/2016   • Headache, migraine 04/29/2016   • Fibromyalgia 04/29/2016   • Acquired hypothyroidism 11/12/2015   • Rheumatoid arthritis (HCC) 07/07/2009   • GERD (gastroesophageal reflux disease) 07/07/2009   • Dyslipidemia 07/07/2009        Current Outpatient Prescriptions   Medication Sig Dispense Refill   • ranitidine (ZANTAC) 300 MG tablet TAKE 1 TABLET BY MOUTH TWICE DAILY 180 Tab 0   • cyclobenzaprine (FLEXERIL) 10 MG Tab TAKE 1 TABLET BY MOUTH TWICE DAILY AS NEEDED FOR MODERATE PAIN OR  MUSCLE SPASMS 60 Tab 0   • levothyroxine (SYNTHROID) 150 MCG Tab TAKE 1 TABLET BY MOUTH ONCE DAILY IN THE MORNING BEFORE BREAKFAST 30 Tab 5   • docusate sodium (COLACE) 100 MG Cap Take 1 Cap by mouth 2 times a day. 60 Cap 3   • polyethylene glycol 3350 (MIRALAX) Powder Take 17 g by mouth 1 time daily as needed. 30 Bottle 3   • sulindac (CLINORIL) 150 MG Tab Take 1 Tab by mouth 2 times daily with meals as needed. 60 Tab 0   • propranolol (INDERAL) 40 MG Tab Take 1 Tab by mouth 2 times a day. 120 Tab 1   • CIMZIA PREFILLED 2 X 200 MG/ML Kit      • loratadine (CLARITIN) 10 MG Tab Take 1 Tab by mouth every day. 30 Tab 1     No current facility-administered medications for this visit.        ROS: As per HPI. Additional pertinent symptoms as noted below.      There were no vitals taken for this visit.    Physical Exam   Constitutional:  oriented to person, place, and time. No distress. Mildly disheveled in work clothes.   Eyes: Pupils are equal, round, and reactive to light. No scleral icterus.  Neck: Neck supple. No thyromegaly present.   Cardiovascular: Normal rate, regular rhythm and normal heart sounds.  Exam reveals no gallop and no friction rub.  No murmur heard.  Pulmonary/Chest: Breath sounds normal. Chest wall is not dull to percussion.   Musculoskeletal:   no edema.   Lymphadenopathy: no cervical adenopathy  Neurological: alert and oriented to person, place, and time.   Skin: No cyanosis. Nails show no clubbing.      Assessment and Plan    1. Rheumatoid arthritis  - Follow by rheumatology  - On IM cimzia, sulindac  - Causing chronic joint pain    2. Migraine without aura and without status migrainosus  - Seen previously by Dr. Ley, neurology  -  Previously took 1000mg ASA daily and sumatriptan PRN  - Stop taking aspirin for now  - Taking fioricet PRN  - Agreeable to try propranolol BID     3. Gastroesophageal reflux disease, esophagitis presence not specified  - Stop taking ASA  - Continue ranitidine daily    4. Constipation  - Bowel regimen with colace daily  - Miralax PRN     5. Acquired hypothyroid  - TSH 24.27 on 11/2017  - Patient complains of fatigue. Recheck labs.     6. Preventive care  TD- 8/2009  TDaP - 5/2016  Pneumococcal - 11/2015  Prevnar - Not recieved  Colonoscopy - 11/2018, recheck in 5 years  Zostavax- Not of age  Influenza: 11/2018    PLAN: Check TSH/T4, give influenza vaccine      Signed by: Deonte Hernandez M.D.

## 2018-11-05 NOTE — NON-PROVIDER
"Christophe Leary is a 58 y.o. male here for a non-provider visit for:   FLU    Reason for immunization: Annual Flu Vaccine  Immunization records indicate need for vaccine: Yes, confirmed with Epic  Minimum interval has been met for this vaccine: Yes  ABN completed: Not Indicated    Order and dose verified by: LALITO  VIS Dated  08/07/15 was given to patient: Yes  All IAC Questionnaire questions were answered \"No.\"    Patient tolerated injection and no adverse effects were observed or reported: Yes    Pt scheduled for next dose in series: Not Indicated  "

## 2018-11-28 ENCOUNTER — HOSPITAL ENCOUNTER (OUTPATIENT)
Dept: LAB | Facility: MEDICAL CENTER | Age: 58
End: 2018-11-28
Attending: INTERNAL MEDICINE
Payer: MEDICAID

## 2018-11-28 LAB
T4 FREE SERPL-MCNC: 1.33 NG/DL (ref 0.53–1.43)
TSH SERPL DL<=0.005 MIU/L-ACNC: 0.81 UIU/ML (ref 0.38–5.33)

## 2018-11-28 PROCEDURE — 84443 ASSAY THYROID STIM HORMONE: CPT

## 2018-11-28 PROCEDURE — 36415 COLL VENOUS BLD VENIPUNCTURE: CPT

## 2018-11-28 PROCEDURE — 84439 ASSAY OF FREE THYROXINE: CPT

## 2018-11-29 DIAGNOSIS — M62.838 MUSCLE SPASM: ICD-10-CM

## 2018-11-30 NOTE — TELEPHONE ENCOUNTER
PT SEEN: 11/5/18 WITH Dr. THOMASON NEXT APPT 2/19/19 WITH Dr. THOMASON  Was the patient seen in the last year in this department? Yes     Does patient have an active prescription for medications requested? No     Received Request Via: Pharmacy

## 2018-12-03 RX ORDER — CYCLOBENZAPRINE HCL 10 MG
TABLET ORAL
Qty: 60 TAB | Refills: 0 | Status: SHIPPED | OUTPATIENT
Start: 2018-12-03 | End: 2019-01-25 | Stop reason: SDUPTHER

## 2019-01-01 NOTE — TELEPHONE ENCOUNTER
Order placed for 5% patch.  
[facility] Name[facility]    Pharmacy Phone#: [phone]    Pharmacy Fax#: [phone]    Request received via: fax    Message: lidocaine patch not available in 4% they are in 5% the cream or solution come in 4% please clarify send new rx if appropriate        
DIANA Stone

## 2019-01-28 DIAGNOSIS — G43.009 MIGRAINE WITHOUT AURA AND WITHOUT STATUS MIGRAINOSUS, NOT INTRACTABLE: ICD-10-CM

## 2019-01-28 RX ORDER — PROPRANOLOL HYDROCHLORIDE 40 MG/1
40 TABLET ORAL 2 TIMES DAILY
Qty: 60 TAB | Refills: 1 | Status: SHIPPED | OUTPATIENT
Start: 2019-01-28 | End: 2019-02-18

## 2019-01-28 RX ORDER — ALBUTEROL SULFATE 90 MCG
2 HFA AEROSOL WITH ADAPTER (GRAM) INHALATION EVERY 4 HOURS PRN
COMMUNITY
End: 2023-08-08

## 2019-02-18 ENCOUNTER — APPOINTMENT (OUTPATIENT)
Dept: RADIOLOGY | Facility: MEDICAL CENTER | Age: 59
DRG: 202 | End: 2019-02-18
Attending: EMERGENCY MEDICINE
Payer: MEDICAID

## 2019-02-18 ENCOUNTER — HOSPITAL ENCOUNTER (INPATIENT)
Facility: MEDICAL CENTER | Age: 59
LOS: 1 days | DRG: 202 | End: 2019-02-19
Attending: EMERGENCY MEDICINE | Admitting: HOSPITALIST
Payer: MEDICAID

## 2019-02-18 DIAGNOSIS — J96.01 ACUTE RESPIRATORY FAILURE WITH HYPOXIA (HCC): ICD-10-CM

## 2019-02-18 DIAGNOSIS — J45.52: ICD-10-CM

## 2019-02-18 PROBLEM — R79.89 AZOTEMIA: Status: ACTIVE | Noted: 2019-02-18

## 2019-02-18 PROBLEM — E87.1 HYPONATREMIA: Status: ACTIVE | Noted: 2019-02-18

## 2019-02-18 LAB
ALBUMIN SERPL BCP-MCNC: 4.8 G/DL (ref 3.2–4.9)
ALBUMIN/GLOB SERPL: 1.4 G/DL
ALP SERPL-CCNC: 68 U/L (ref 30–99)
ALT SERPL-CCNC: 13 U/L (ref 2–50)
ANION GAP SERPL CALC-SCNC: 7 MMOL/L (ref 0–11.9)
APPEARANCE UR: CLEAR
AST SERPL-CCNC: 15 U/L (ref 12–45)
BASOPHILS # BLD AUTO: 0.8 % (ref 0–1.8)
BASOPHILS # BLD: 0.04 K/UL (ref 0–0.12)
BILIRUB SERPL-MCNC: 0.4 MG/DL (ref 0.1–1.5)
BILIRUB UR QL STRIP.AUTO: NEGATIVE
BUN SERPL-MCNC: 27 MG/DL (ref 8–22)
CALCIUM SERPL-MCNC: 10.2 MG/DL (ref 8.5–10.5)
CHLORIDE SERPL-SCNC: 99 MMOL/L (ref 96–112)
CO2 SERPL-SCNC: 27 MMOL/L (ref 20–33)
COLOR UR: YELLOW
CREAT SERPL-MCNC: 1.21 MG/DL (ref 0.5–1.4)
EKG IMPRESSION: NORMAL
EKG IMPRESSION: NORMAL
EOSINOPHIL # BLD AUTO: 0.39 K/UL (ref 0–0.51)
EOSINOPHIL NFR BLD: 7.5 % (ref 0–6.9)
ERYTHROCYTE [DISTWIDTH] IN BLOOD BY AUTOMATED COUNT: 42.5 FL (ref 35.9–50)
FLUAV RNA SPEC QL NAA+PROBE: NEGATIVE
FLUBV RNA SPEC QL NAA+PROBE: NEGATIVE
GLOBULIN SER CALC-MCNC: 3.5 G/DL (ref 1.9–3.5)
GLUCOSE SERPL-MCNC: 107 MG/DL (ref 65–99)
GLUCOSE UR STRIP.AUTO-MCNC: NEGATIVE MG/DL
HCT VFR BLD AUTO: 44.8 % (ref 42–52)
HGB BLD-MCNC: 15 G/DL (ref 14–18)
IMM GRANULOCYTES # BLD AUTO: 0.02 K/UL (ref 0–0.11)
IMM GRANULOCYTES NFR BLD AUTO: 0.4 % (ref 0–0.9)
KETONES UR STRIP.AUTO-MCNC: NEGATIVE MG/DL
LACTATE BLD-SCNC: 1.8 MMOL/L (ref 0.5–2)
LEUKOCYTE ESTERASE UR QL STRIP.AUTO: NEGATIVE
LYMPHOCYTES # BLD AUTO: 0.43 K/UL (ref 1–4.8)
LYMPHOCYTES NFR BLD: 8.3 % (ref 22–41)
MCH RBC QN AUTO: 29.6 PG (ref 27–33)
MCHC RBC AUTO-ENTMCNC: 33.5 G/DL (ref 33.7–35.3)
MCV RBC AUTO: 88.5 FL (ref 81.4–97.8)
MICRO URNS: NORMAL
MONOCYTES # BLD AUTO: 0.37 K/UL (ref 0–0.85)
MONOCYTES NFR BLD AUTO: 7.1 % (ref 0–13.4)
NEUTROPHILS # BLD AUTO: 3.94 K/UL (ref 1.82–7.42)
NEUTROPHILS NFR BLD: 75.9 % (ref 44–72)
NITRITE UR QL STRIP.AUTO: NEGATIVE
NRBC # BLD AUTO: 0 K/UL
NRBC BLD-RTO: 0 /100 WBC
PH UR STRIP.AUTO: 6 [PH]
PLATELET # BLD AUTO: 244 K/UL (ref 164–446)
PMV BLD AUTO: 8.5 FL (ref 9–12.9)
POTASSIUM SERPL-SCNC: 4.2 MMOL/L (ref 3.6–5.5)
PROT SERPL-MCNC: 8.3 G/DL (ref 6–8.2)
PROT UR QL STRIP: NEGATIVE MG/DL
RBC # BLD AUTO: 5.06 M/UL (ref 4.7–6.1)
RBC UR QL AUTO: NEGATIVE
SODIUM SERPL-SCNC: 133 MMOL/L (ref 135–145)
SP GR UR STRIP.AUTO: 1.01
TROPONIN I SERPL-MCNC: <0.01 NG/ML (ref 0–0.04)
UROBILINOGEN UR STRIP.AUTO-MCNC: 0.2 MG/DL
WBC # BLD AUTO: 5.2 K/UL (ref 4.8–10.8)

## 2019-02-18 PROCEDURE — 94640 AIRWAY INHALATION TREATMENT: CPT

## 2019-02-18 PROCEDURE — 700111 HCHG RX REV CODE 636 W/ 250 OVERRIDE (IP): Performed by: EMERGENCY MEDICINE

## 2019-02-18 PROCEDURE — 85025 COMPLETE CBC W/AUTO DIFF WBC: CPT

## 2019-02-18 PROCEDURE — 93005 ELECTROCARDIOGRAM TRACING: CPT | Performed by: HOSPITALIST

## 2019-02-18 PROCEDURE — 700101 HCHG RX REV CODE 250: Performed by: HOSPITALIST

## 2019-02-18 PROCEDURE — 700101 HCHG RX REV CODE 250: Performed by: EMERGENCY MEDICINE

## 2019-02-18 PROCEDURE — 36415 COLL VENOUS BLD VENIPUNCTURE: CPT

## 2019-02-18 PROCEDURE — 87040 BLOOD CULTURE FOR BACTERIA: CPT

## 2019-02-18 PROCEDURE — 83605 ASSAY OF LACTIC ACID: CPT

## 2019-02-18 PROCEDURE — 700102 HCHG RX REV CODE 250 W/ 637 OVERRIDE(OP): Performed by: INTERNAL MEDICINE

## 2019-02-18 PROCEDURE — 700111 HCHG RX REV CODE 636 W/ 250 OVERRIDE (IP): Performed by: STUDENT IN AN ORGANIZED HEALTH CARE EDUCATION/TRAINING PROGRAM

## 2019-02-18 PROCEDURE — 99223 1ST HOSP IP/OBS HIGH 75: CPT | Performed by: HOSPITALIST

## 2019-02-18 PROCEDURE — 81003 URINALYSIS AUTO W/O SCOPE: CPT

## 2019-02-18 PROCEDURE — 96375 TX/PRO/DX INJ NEW DRUG ADDON: CPT

## 2019-02-18 PROCEDURE — 87086 URINE CULTURE/COLONY COUNT: CPT

## 2019-02-18 PROCEDURE — 93005 ELECTROCARDIOGRAM TRACING: CPT

## 2019-02-18 PROCEDURE — 700105 HCHG RX REV CODE 258: Performed by: HOSPITALIST

## 2019-02-18 PROCEDURE — 93010 ELECTROCARDIOGRAM REPORT: CPT | Performed by: INTERNAL MEDICINE

## 2019-02-18 PROCEDURE — A9270 NON-COVERED ITEM OR SERVICE: HCPCS | Performed by: INTERNAL MEDICINE

## 2019-02-18 PROCEDURE — 700102 HCHG RX REV CODE 250 W/ 637 OVERRIDE(OP): Performed by: HOSPITALIST

## 2019-02-18 PROCEDURE — 87502 INFLUENZA DNA AMP PROBE: CPT

## 2019-02-18 PROCEDURE — 700101 HCHG RX REV CODE 250

## 2019-02-18 PROCEDURE — 700105 HCHG RX REV CODE 258: Performed by: STUDENT IN AN ORGANIZED HEALTH CARE EDUCATION/TRAINING PROGRAM

## 2019-02-18 PROCEDURE — 700102 HCHG RX REV CODE 250 W/ 637 OVERRIDE(OP): Performed by: STUDENT IN AN ORGANIZED HEALTH CARE EDUCATION/TRAINING PROGRAM

## 2019-02-18 PROCEDURE — 84484 ASSAY OF TROPONIN QUANT: CPT

## 2019-02-18 PROCEDURE — 99285 EMERGENCY DEPT VISIT HI MDM: CPT

## 2019-02-18 PROCEDURE — 770006 HCHG ROOM/CARE - MED/SURG/GYN SEMI*

## 2019-02-18 PROCEDURE — 80053 COMPREHEN METABOLIC PANEL: CPT

## 2019-02-18 PROCEDURE — 96365 THER/PROPH/DIAG IV INF INIT: CPT

## 2019-02-18 PROCEDURE — A9270 NON-COVERED ITEM OR SERVICE: HCPCS | Performed by: STUDENT IN AN ORGANIZED HEALTH CARE EDUCATION/TRAINING PROGRAM

## 2019-02-18 PROCEDURE — 71045 X-RAY EXAM CHEST 1 VIEW: CPT

## 2019-02-18 PROCEDURE — A9270 NON-COVERED ITEM OR SERVICE: HCPCS | Performed by: HOSPITALIST

## 2019-02-18 PROCEDURE — 700111 HCHG RX REV CODE 636 W/ 250 OVERRIDE (IP): Performed by: HOSPITALIST

## 2019-02-18 RX ORDER — IPRATROPIUM BROMIDE AND ALBUTEROL SULFATE 2.5; .5 MG/3ML; MG/3ML
3 SOLUTION RESPIRATORY (INHALATION)
Status: DISCONTINUED | OUTPATIENT
Start: 2019-02-18 | End: 2019-02-19 | Stop reason: HOSPADM

## 2019-02-18 RX ORDER — PROMETHAZINE HYDROCHLORIDE 25 MG/1
12.5-25 TABLET ORAL EVERY 4 HOURS PRN
Status: DISCONTINUED | OUTPATIENT
Start: 2019-02-18 | End: 2019-02-19 | Stop reason: HOSPADM

## 2019-02-18 RX ORDER — PROMETHAZINE HYDROCHLORIDE 25 MG/1
12.5-25 SUPPOSITORY RECTAL EVERY 4 HOURS PRN
Status: DISCONTINUED | OUTPATIENT
Start: 2019-02-18 | End: 2019-02-19 | Stop reason: HOSPADM

## 2019-02-18 RX ORDER — PREGABALIN 75 MG/1
75 CAPSULE ORAL 2 TIMES DAILY
Status: DISCONTINUED | OUTPATIENT
Start: 2019-02-18 | End: 2019-02-18

## 2019-02-18 RX ORDER — AMOXICILLIN 250 MG
2 CAPSULE ORAL 2 TIMES DAILY
Status: DISCONTINUED | OUTPATIENT
Start: 2019-02-18 | End: 2019-02-19 | Stop reason: HOSPADM

## 2019-02-18 RX ORDER — CYCLOBENZAPRINE HCL 10 MG
10 TABLET ORAL 3 TIMES DAILY PRN
Status: DISCONTINUED | OUTPATIENT
Start: 2019-02-18 | End: 2019-02-19 | Stop reason: HOSPADM

## 2019-02-18 RX ORDER — SODIUM CHLORIDE 9 MG/ML
2000 INJECTION, SOLUTION INTRAVENOUS ONCE
Status: COMPLETED | OUTPATIENT
Start: 2019-02-18 | End: 2019-02-19

## 2019-02-18 RX ORDER — BUTALBITAL, ACETAMINOPHEN AND CAFFEINE 50; 325; 40 MG/1; MG/1; MG/1
2 TABLET ORAL EVERY 6 HOURS PRN
Status: DISCONTINUED | OUTPATIENT
Start: 2019-02-18 | End: 2019-02-19 | Stop reason: HOSPADM

## 2019-02-18 RX ORDER — IPRATROPIUM BROMIDE AND ALBUTEROL SULFATE 2.5; .5 MG/3ML; MG/3ML
3 SOLUTION RESPIRATORY (INHALATION)
Status: DISCONTINUED | OUTPATIENT
Start: 2019-02-18 | End: 2019-02-18

## 2019-02-18 RX ORDER — ALBUTEROL SULFATE 90 UG/1
2 AEROSOL, METERED RESPIRATORY (INHALATION)
Status: DISCONTINUED | OUTPATIENT
Start: 2019-02-18 | End: 2019-02-18

## 2019-02-18 RX ORDER — DOCUSATE SODIUM 100 MG/1
100 CAPSULE, LIQUID FILLED ORAL 2 TIMES DAILY
Status: DISCONTINUED | OUTPATIENT
Start: 2019-02-18 | End: 2019-02-19 | Stop reason: HOSPADM

## 2019-02-18 RX ORDER — OXYCODONE AND ACETAMINOPHEN 10; 325 MG/1; MG/1
1 TABLET ORAL 3 TIMES DAILY
COMMUNITY
Start: 2019-01-25

## 2019-02-18 RX ORDER — DOXYCYCLINE 100 MG/1
100 TABLET ORAL EVERY 12 HOURS
Status: DISCONTINUED | OUTPATIENT
Start: 2019-02-18 | End: 2019-02-19 | Stop reason: HOSPADM

## 2019-02-18 RX ORDER — METHYLPREDNISOLONE SODIUM SUCCINATE 40 MG/ML
40 INJECTION, POWDER, LYOPHILIZED, FOR SOLUTION INTRAMUSCULAR; INTRAVENOUS EVERY 6 HOURS
Status: DISCONTINUED | OUTPATIENT
Start: 2019-02-18 | End: 2019-02-19 | Stop reason: HOSPADM

## 2019-02-18 RX ORDER — DOXYCYCLINE 100 MG/1
100 TABLET ORAL ONCE
Status: COMPLETED | OUTPATIENT
Start: 2019-02-18 | End: 2019-02-18

## 2019-02-18 RX ORDER — MORPHINE SULFATE 15 MG/1
30 TABLET, FILM COATED, EXTENDED RELEASE ORAL EVERY 12 HOURS
Status: DISCONTINUED | OUTPATIENT
Start: 2019-02-18 | End: 2019-02-19 | Stop reason: HOSPADM

## 2019-02-18 RX ORDER — FAMOTIDINE 20 MG/1
20 TABLET, FILM COATED ORAL 2 TIMES DAILY
Status: DISCONTINUED | OUTPATIENT
Start: 2019-02-18 | End: 2019-02-19 | Stop reason: HOSPADM

## 2019-02-18 RX ORDER — ONDANSETRON 2 MG/ML
4 INJECTION INTRAMUSCULAR; INTRAVENOUS EVERY 4 HOURS PRN
Status: DISCONTINUED | OUTPATIENT
Start: 2019-02-18 | End: 2019-02-19 | Stop reason: HOSPADM

## 2019-02-18 RX ORDER — SULINDAC 150 MG/1
150 TABLET ORAL 2 TIMES DAILY
Status: DISCONTINUED | OUTPATIENT
Start: 2019-02-18 | End: 2019-02-19 | Stop reason: HOSPADM

## 2019-02-18 RX ORDER — POLYETHYLENE GLYCOL 3350 17 G/17G
1 POWDER, FOR SOLUTION ORAL
Status: DISCONTINUED | OUTPATIENT
Start: 2019-02-18 | End: 2019-02-19 | Stop reason: HOSPADM

## 2019-02-18 RX ORDER — ONDANSETRON 4 MG/1
4 TABLET, ORALLY DISINTEGRATING ORAL EVERY 4 HOURS PRN
Status: DISCONTINUED | OUTPATIENT
Start: 2019-02-18 | End: 2019-02-19 | Stop reason: HOSPADM

## 2019-02-18 RX ORDER — BUTALBITAL, ACETAMINOPHEN AND CAFFEINE 50; 325; 40 MG/1; MG/1; MG/1
1-2 TABLET ORAL EVERY 4 HOURS PRN
COMMUNITY
End: 2019-08-20

## 2019-02-18 RX ORDER — METHYLPREDNISOLONE SODIUM SUCCINATE 125 MG/2ML
125 INJECTION, POWDER, LYOPHILIZED, FOR SOLUTION INTRAMUSCULAR; INTRAVENOUS ONCE
Status: COMPLETED | OUTPATIENT
Start: 2019-02-18 | End: 2019-02-18

## 2019-02-18 RX ORDER — ALBUTEROL SULFATE 90 UG/1
2 AEROSOL, METERED RESPIRATORY (INHALATION) EVERY 4 HOURS PRN
Status: DISCONTINUED | OUTPATIENT
Start: 2019-02-18 | End: 2019-02-19 | Stop reason: HOSPADM

## 2019-02-18 RX ORDER — OXYCODONE AND ACETAMINOPHEN 10; 325 MG/1; MG/1
1 TABLET ORAL EVERY 8 HOURS PRN
Status: DISCONTINUED | OUTPATIENT
Start: 2019-02-18 | End: 2019-02-19 | Stop reason: HOSPADM

## 2019-02-18 RX ORDER — PREGABALIN 75 MG/1
75 CAPSULE ORAL 2 TIMES DAILY
Status: DISCONTINUED | OUTPATIENT
Start: 2019-02-18 | End: 2019-02-19 | Stop reason: HOSPADM

## 2019-02-18 RX ORDER — LEVOTHYROXINE SODIUM 0.15 MG/1
150 TABLET ORAL
Status: DISCONTINUED | OUTPATIENT
Start: 2019-02-19 | End: 2019-02-19 | Stop reason: HOSPADM

## 2019-02-18 RX ORDER — RANITIDINE 300 MG/1
300 TABLET ORAL 2 TIMES DAILY
Status: DISCONTINUED | OUTPATIENT
Start: 2019-02-18 | End: 2019-02-18

## 2019-02-18 RX ORDER — BISACODYL 10 MG
10 SUPPOSITORY, RECTAL RECTAL
Status: DISCONTINUED | OUTPATIENT
Start: 2019-02-18 | End: 2019-02-19 | Stop reason: HOSPADM

## 2019-02-18 RX ORDER — ACETAMINOPHEN 325 MG/1
650 TABLET ORAL EVERY 6 HOURS PRN
Status: DISCONTINUED | OUTPATIENT
Start: 2019-02-18 | End: 2019-02-19 | Stop reason: HOSPADM

## 2019-02-18 RX ORDER — ENALAPRILAT 1.25 MG/ML
1.25 INJECTION INTRAVENOUS EVERY 6 HOURS PRN
Status: DISCONTINUED | OUTPATIENT
Start: 2019-02-18 | End: 2019-02-19 | Stop reason: HOSPADM

## 2019-02-18 RX ADMIN — OXYCODONE HYDROCHLORIDE AND ACETAMINOPHEN 1 TABLET: 10; 325 TABLET ORAL at 17:16

## 2019-02-18 RX ADMIN — ENOXAPARIN SODIUM 40 MG: 100 INJECTION SUBCUTANEOUS at 18:27

## 2019-02-18 RX ADMIN — METHYLPREDNISOLONE SODIUM SUCCINATE 40 MG: 40 INJECTION, POWDER, FOR SOLUTION INTRAMUSCULAR; INTRAVENOUS at 18:21

## 2019-02-18 RX ADMIN — CYCLOBENZAPRINE 10 MG: 10 TABLET, FILM COATED ORAL at 19:54

## 2019-02-18 RX ADMIN — ACETAMINOPHEN 650 MG: 325 TABLET, FILM COATED ORAL at 19:54

## 2019-02-18 RX ADMIN — ALBUTEROL SULFATE 2 PUFF: 90 AEROSOL, METERED RESPIRATORY (INHALATION) at 21:43

## 2019-02-18 RX ADMIN — METHYLPREDNISOLONE SODIUM SUCCINATE 40 MG: 40 INJECTION, POWDER, FOR SOLUTION INTRAMUSCULAR; INTRAVENOUS at 23:32

## 2019-02-18 RX ADMIN — IPRATROPIUM BROMIDE AND ALBUTEROL SULFATE 3 ML: .5; 3 SOLUTION RESPIRATORY (INHALATION) at 18:44

## 2019-02-18 RX ADMIN — PREGABALIN 75 MG: 75 CAPSULE ORAL at 18:19

## 2019-02-18 RX ADMIN — IPRATROPIUM BROMIDE AND ALBUTEROL SULFATE 3 ML: .5; 3 SOLUTION RESPIRATORY (INHALATION) at 13:09

## 2019-02-18 RX ADMIN — METHYLPREDNISOLONE SODIUM SUCCINATE 125 MG: 125 INJECTION, POWDER, FOR SOLUTION INTRAMUSCULAR; INTRAVENOUS at 13:40

## 2019-02-18 RX ADMIN — FAMOTIDINE 20 MG: 20 TABLET ORAL at 18:20

## 2019-02-18 RX ADMIN — DOXYCYCLINE 100 MG: 100 TABLET ORAL at 14:14

## 2019-02-18 RX ADMIN — CEFTRIAXONE SODIUM 2 G: 2 INJECTION, POWDER, FOR SOLUTION INTRAMUSCULAR; INTRAVENOUS at 14:13

## 2019-02-18 RX ADMIN — BUTALBITAL, ACETAMINOPHEN, AND CAFFEINE 2 TABLET: 50; 325; 40 TABLET ORAL at 22:13

## 2019-02-18 RX ADMIN — SULINDAC 150 MG: 150 TABLET ORAL at 23:32

## 2019-02-18 RX ADMIN — SODIUM CHLORIDE 2000 ML: 9 INJECTION, SOLUTION INTRAVENOUS at 18:37

## 2019-02-18 RX ADMIN — DOXYCYCLINE 100 MG: 100 TABLET ORAL at 18:21

## 2019-02-18 RX ADMIN — MORPHINE SULFATE 30 MG: 15 TABLET, EXTENDED RELEASE ORAL at 18:19

## 2019-02-18 RX ADMIN — IPRATROPIUM BROMIDE AND ALBUTEROL SULFATE 3 ML: .5; 3 SOLUTION RESPIRATORY (INHALATION) at 22:32

## 2019-02-18 RX ADMIN — IPRATROPIUM BROMIDE AND ALBUTEROL SULFATE 3 ML: .5; 3 SOLUTION RESPIRATORY (INHALATION) at 14:15

## 2019-02-18 ASSESSMENT — COGNITIVE AND FUNCTIONAL STATUS - GENERAL
WALKING IN HOSPITAL ROOM: A LITTLE
STANDING UP FROM CHAIR USING ARMS: A LITTLE
SUGGESTED CMS G CODE MODIFIER DAILY ACTIVITY: CI
CLIMB 3 TO 5 STEPS WITH RAILING: A LITTLE
SUGGESTED CMS G CODE MODIFIER MOBILITY: CJ
MOBILITY SCORE: 20
MOVING FROM LYING ON BACK TO SITTING ON SIDE OF FLAT BED: A LITTLE
TOILETING: A LITTLE
DAILY ACTIVITIY SCORE: 23

## 2019-02-18 ASSESSMENT — ENCOUNTER SYMPTOMS
BACK PAIN: 1
DIARRHEA: 0
PALPITATIONS: 0
FEVER: 0
SINUS PAIN: 0
NAUSEA: 0
WEAKNESS: 1
CHILLS: 0
SHORTNESS OF BREATH: 1
SORE THROAT: 1
HEADACHES: 1
COUGH: 1
ABDOMINAL PAIN: 0
DIZZINESS: 0
NERVOUS/ANXIOUS: 0
VOMITING: 0
BLURRED VISION: 0
SPUTUM PRODUCTION: 0
CONSTIPATION: 0

## 2019-02-18 ASSESSMENT — COPD QUESTIONNAIRES
DO YOU EVER COUGH UP ANY MUCUS OR PHLEGM?: YES, A FEW DAYS A WEEK OR MONTH
COPD SCREENING SCORE: 3
DURING THE PAST 4 WEEKS HOW MUCH DID YOU FEEL SHORT OF BREATH: SOME OF THE TIME
HAVE YOU SMOKED AT LEAST 100 CIGARETTES IN YOUR ENTIRE LIFE: NO/DON'T KNOW

## 2019-02-18 ASSESSMENT — LIFESTYLE VARIABLES
EVER_SMOKED: NEVER
DO YOU DRINK ALCOHOL: NO

## 2019-02-18 NOTE — ED NOTES
Patient roomed to [facility].  Patient no respiratory history.  Expiratory and inspiratory wheezes.  Moderate work of breathing. Placed on O2, respiratory treatment given.

## 2019-02-18 NOTE — H&P
Hospital Medicine History & Physical Note    Date of Service  2/18/2019    Primary Care Physician  Deonte Hernandez M.D.    Consultants  None    Code Status  Full    Chief Complaint  Short of breath times 3 days    History of Presenting Illness  58 y.o. male with rheumatoid arthritis, fibromyalgia, hypertension, hypothyroid who presented 2/18/2019 with increasing shortness of breath and nonproductive cough for the last week.  The past 3 days has had increasing shortness of breath.  Patient denies any recent fevers chills nor any sick contacts that he is aware of.  Patient denies any nausea vomiting diarrhea.  Patient is a non-smoker although he has history of secondhand smoke growing up and he is a .    Review of Systems  Review of Systems   Constitutional: Negative for chills and fever.   HENT: Positive for sore throat. Negative for congestion and sinus pain.    Eyes: Negative for blurred vision.   Respiratory: Positive for cough and shortness of breath. Negative for sputum production.    Cardiovascular: Negative for chest pain, palpitations and leg swelling.   Gastrointestinal: Negative for abdominal pain, constipation, diarrhea, nausea and vomiting.   Genitourinary: Negative for dysuria.   Musculoskeletal: Positive for back pain (chronic) and joint pain (chronic).   Neurological: Positive for weakness and headaches (chronic). Negative for dizziness.   Psychiatric/Behavioral: The patient is not nervous/anxious.        Past Medical History   has a past medical history of Fibromyalgia; GERD (gastroesophageal reflux disease); Hypertension; Hypothyroidism; and Rheumatoid arthritis(714.0).    Surgical History   has a past surgical history that includes other orthopedic surgery and knee arthroplasty total (2013).     Family History  family history includes Heart Disease in his father.     Social History   reports that he has never smoked. He has never used smokeless tobacco. He reports that he drinks alcohol. He  reports that he does not use drugs.    Allergies  Allergies   Allergen Reactions   • Cymbalta [Duloxetine Hcl]      Extreme fatigue        Medications  Prior to Admission Medications   Prescriptions Last Dose Informant Patient Reported? Taking?   LYRICA 75 MG Cap 2/18/2019 at 0800 Patient Yes No   Sig: Take 75 mg by mouth 2 times a day.   PROVENTIL  (90 Base) MCG/ACT Aero Soln inhalation aerosol 2/18/2019 at 1030 Patient Yes No   Sig: Inhale 2 Puffs by mouth every four hours as needed for Shortness of Breath.   cyclobenzaprine (FLEXERIL) 10 MG Tab 2/17/2019 at 2230 Patient No No   Sig: TAKE 1 TABLET BY MOUTH TWICE DAILY AS NEEDED FOR MODERATE PAIN OR  MUSCLE SPASMS   docusate sodium (COLACE) 100 MG Cap 2/17/2019 at 2230 Patient No No   Sig: Take 1 Cap by mouth 2 times a day.   levothyroxine (SYNTHROID) 150 MCG Tab 2/18/2019 at 0830 Patient No No   Sig: TAKE 1 TABLET BY MOUTH ONCE DAILY IN THE MORNING BEFORE BREAKFAST   morphine ER (MS CONTIN) 30 MG Tab CR tablet 2/18/2019 at 0800 Patient Yes No   Sig: Take 30 mg by mouth every 12 hours.   oxyCODONE-acetaminophen (PERCOCET-10)  MG Tab 2/18/2019 at 0900 Patient Yes No   Sig: Take 1 Tab by mouth 3 times a day.   ranitidine (ZANTAC) 300 MG tablet 2/17/2019 at 2230 Patient No No   Sig: TAKE 1 TABLET BY MOUTH TWICE DAILY   sulindac (CLINORIL) 150 MG Tab 2/18/2019 at 0800 Patient No No   Sig: Take 1 Tab by mouth 2 times a day.      Facility-Administered Medications: None       Physical Exam  Temp:  [36.7 °C (98.1 °F)] 36.7 °C (98.1 °F)  Pulse:  [109-124] 120  Resp:  [10-34] 10  BP: (144-150)/() 150/89  SpO2:  [87 %-97 %] 89 %    Physical Exam   Constitutional: He is oriented to person, place, and time. He appears well-developed. No distress.   HENT:   Head: Normocephalic and atraumatic.   Nose: Nose normal.   Mouth/Throat: No oropharyngeal exudate.   Eyes: Conjunctivae and EOM are normal. Right eye exhibits no discharge. Left eye exhibits no discharge.    Neck: No tracheal deviation present.   Cardiovascular: Normal rate, regular rhythm and normal heart sounds.    No murmur heard.  Pulses:       Radial pulses are 2+ on the right side, and 2+ on the left side.        Dorsalis pedis pulses are 2+ on the right side, and 2+ on the left side.   Pulmonary/Chest: No stridor. No respiratory distress. He has decreased breath sounds (tight and decreased throughout). He has wheezes.   Abdominal: Soft. Bowel sounds are normal. He exhibits no distension. There is no tenderness.   Musculoskeletal: He exhibits no edema.   Lymphadenopathy:     He has no cervical adenopathy.   Neurological: He is alert and oriented to person, place, and time. No cranial nerve deficit.   Skin: Skin is warm. He is not diaphoretic. No erythema.   Psychiatric: He has a normal mood and affect. His behavior is normal.   Vitals reviewed.      Laboratory:  Recent Labs      02/18/19   1310   WBC  5.2   RBC  5.06   HEMOGLOBIN  15.0   HEMATOCRIT  44.8   MCV  88.5   MCH  29.6   MCHC  33.5*   RDW  42.5   PLATELETCT  244   MPV  8.5*     Recent Labs      02/18/19   1310   SODIUM  133*   POTASSIUM  4.2   CHLORIDE  99   CO2  27   GLUCOSE  107*   BUN  27*   CREATININE  1.21   CALCIUM  10.2     Recent Labs      02/18/19   1310   ALTSGPT  13   ASTSGOT  15   ALKPHOSPHAT  68   TBILIRUBIN  0.4   GLUCOSE  107*                 No results for input(s): TROPONINI in the last 72 hours.    Urinalysis:    Recent Labs      02/18/19   1508   SPECGRAVITY  1.015   GLUCOSEUR  Negative   KETONES  Negative   NITRITE  Negative   LEUKESTERAS  Negative        Imaging:  DX-CHEST-PORTABLE (1 VIEW)   Final Result      No acute cardiac or pulmonary abnormality is noted.      DX-CHEST-2 VIEWS    (Results Pending)         Assessment/Plan:  I anticipate this patient will require at least two midnights for appropriate medical management, necessitating inpatient admission.    Reactive airway disease- (present on admission)   Assessment & Plan     Solu-Medrol 40 mg every 8 hours  RT protocol with as needed bronchodilators  Suspect viral etiology exacerbation  Supplemental oxygen to keep sats greater than 90%     Azotemia   Assessment & Plan    Likely prerenal  IV fluids monitor BMP     Hyponatremia   Assessment & Plan    Likely hypovolemia hyponatremia  IV fluids  Follow-up BMP     Chronic use of opiate for therapeutic purpose- (present on admission)   Assessment & Plan    Watch for constipation.  Continue outpatient medications hold if lethargy.     Fibromyalgia- (present on admission)   Assessment & Plan    Continue outpatient medications ongoing active medication treatment     Acquired hypothyroidism- (present on admission)   Assessment & Plan    Continue Synthroid 150 mcg   TSH 0.81 in past 3 months     Dyslipidemia- (present on admission)   Assessment & Plan    Statin therapy as needed     Rheumatoid arthritis (HCC)- (present on admission)   Assessment & Plan    Continue with outpatient pain medications follow-up with outpatient rheumatologist as needed         VTE prophylaxis: lovenox

## 2019-02-18 NOTE — ED PROVIDER NOTES
CHIEF COMPLAINT(1/4)  Chief Complaint   Patient presents with   • Shortness of Breath   • Cough       HPI  Christophe Jeevan Leary is a 58 y.o. Male, with  PMHx of RA, fibromyalgia, chronic pain and hypothyroidism, who presents with worsening dyspnea at rest and with exertion.   It has been happening for the past 2 months, but has worsened over the past 1 day.  Associated with increasing wheezing, cough and increased white/green sputum production.  Denies any associated chest pain, palpitations, dizziness, fever or chills.  He does not have any home oxygen, but uses Symbicort and albuterol at home.  He has been non-compliant for the past 1 year and only uses inhalers sometimes.  He has no history of COPD or asthma.  Denies history of CAD or diabetes.  He is a non-smoker.  Denies recent illness or sick contacts.  He does not use home O2, and was given duoneb and O2 NC upon arrival to the ED.  He is taking Sulindac and Cimzia for his RA, managed by Dr. Gomez.      REVIEW OF SYSTEMS(1/10)  Pertinent positives include: cough, sputum production, dyspnea, arthralgia.  Pertinent negatives include: chest pain, palpitations, dizziness, fever, chills, myalgias.   All other systems are negative.     PAST MEDICAL HISTORY(PFS1,2)  Past Medical History:   Diagnosis Date   • Fibromyalgia    • GERD (gastroesophageal reflux disease)    • Hypertension     medicated   • Hypothyroidism    • Rheumatoid arthritis(714.0)        FAMILY HISTORY  Family History   Problem Relation Age of Onset   • Heart Disease Father        SOCIAL HISTORY  Social History   Substance Use Topics   • Smoking status: Never Smoker   • Smokeless tobacco: Never Used   • Alcohol use 0.0 oz/week      Comment: rarely     History   Drug Use No     Comment: METH AMPHETAMINE 20 YRS AGO       SURGICAL HISTORY  Past Surgical History:   Procedure Laterality Date   • KNEE ARTHROPLASTY TOTAL  2013    right   • OTHER ORTHOPEDIC SURGERY      knees, elbow, wrist       CURRENT  "MEDICATIONS  Home Medications    **Home medications have not yet been reviewed for this encounter**         ALLERGIES  Allergies   Allergen Reactions   • Cymbalta [Duloxetine Hcl]      Extreme fatigue        PHYSICAL EXAM  VITAL SIGNS: /89   Pulse (!) 109   Temp 36.7 °C (98.1 °F) (Temporal)   Resp 20   Ht 1.778 m (5' 10\")   Wt 76.2 kg (167 lb 15.9 oz)   SpO2 94%   BMI 24.10 kg/m²  Reviewed.  Constitutional: Well developed, Well nourished, no acute distress.  HENT: Normocephalic, atraumatic, bilateral external ears normal, oropharynx moist, No exudates or erythema.   Eyes: PERRLA, conjunctiva pink, no scleral icterus.   Cardiovascular: Tachycardic, regular rhythm.  No murmurs, rubs or gallops.  No lower extremity edema.  Respiratory: normal effort, diffuse wheezing, no rales, no crackles.  Gastrointestinal: abdomen is soft, normoactive bowel sounds, no tenderness.  Skin: No erythema, no rash.   Genitourinary:  No costovertebral angle tenderness.   Neurologic: Alert & oriented x 3, cranial nerves 2-12 intact by passive exam.  No focal deficit noted.  Psychiatric: Affect normal, Judgment normal, Mood normal.     DIFFERENTIAL DIAGNOSIS:  COPD exacerbation  Non-specific reactive airway disease  Pneumonia  Influenza     EKG  Sinus tachycardia  Probable left atrial abnormality     RADIOLOGY/PROCEDURES  DX-CHEST-PORTABLE (1 VIEW)   Final Result      No acute cardiac or pulmonary abnormality is noted.          LABORATORY: Reviewed as below.  Results for orders placed or performed during the hospital encounter of 02/18/19   Lactic acid (lactate)   Result Value Ref Range    Lactic Acid 1.8 0.5 - 2.0 mmol/L   CBC WITH DIFFERENTIAL   Result Value Ref Range    WBC 5.2 4.8 - 10.8 K/uL    RBC 5.06 4.70 - 6.10 M/uL    Hemoglobin 15.0 14.0 - 18.0 g/dL    Hematocrit 44.8 42.0 - 52.0 %    MCV 88.5 81.4 - 97.8 fL    MCH 29.6 27.0 - 33.0 pg    MCHC 33.5 (L) 33.7 - 35.3 g/dL    RDW 42.5 35.9 - 50.0 fL    Platelet Count 244 " 164 - 446 K/uL    MPV 8.5 (L) 9.0 - 12.9 fL    Neutrophils-Polys 75.90 (H) 44.00 - 72.00 %    Lymphocytes 8.30 (L) 22.00 - 41.00 %    Monocytes 7.10 0.00 - 13.40 %    Eosinophils 7.50 (H) 0.00 - 6.90 %    Basophils 0.80 0.00 - 1.80 %    Immature Granulocytes 0.40 0.00 - 0.90 %    Nucleated RBC 0.00 /100 WBC    Neutrophils (Absolute) 3.94 1.82 - 7.42 K/uL    Lymphs (Absolute) 0.43 (L) 1.00 - 4.80 K/uL    Monos (Absolute) 0.37 0.00 - 0.85 K/uL    Eos (Absolute) 0.39 0.00 - 0.51 K/uL    Baso (Absolute) 0.04 0.00 - 0.12 K/uL    Immature Granulocytes (abs) 0.02 0.00 - 0.11 K/uL    NRBC (Absolute) 0.00 K/uL   EKG   Result Value Ref Range    Report       [facility] Emergency Dept.    Test Date:  2019  Pt Name:    STEVENSON DEE               Department: ER  MRN:        [id number]                      Room:        10  Gender:     Male                         Technician: [id number]  :        1960                   Requested By:BRIEN ROMERO  Order #:    [id number]                    Reading MD:    Measurements  Intervals                                Axis  Rate:       110                          P:          56  IN:         188                          QRS:        -74  QRSD:       98                           T:          43  QT:         316  QTc:        428    Interpretive Statements  SINUS TACHYCARDIA  PROBABLE LEFT ATRIAL ABNORMALITY  BORDERLINE INFERIOR Q WAVES  Compared to ECG 2017 18:40:27  Myocardial infarct finding no longer present         INTERVENTIONS:  Medications   ipratropium-albuterol (DUONEB) nebulizer solution (3 mL Nebulization Given 19 1309)   ipratropium-albuterol (DUONEB) nebulizer solution (not administered)   cefTRIAXone (ROCEPHIN) 2 g in  mL IVPB (not administered)   doxycycline monohydrate (ADOXA) tablet 100 mg (not administered)   methylPREDNISolone sod succ (SOLU-MEDROL) 125 MG injection 125 mg (125 mg Intravenous Given 19 1340)     Response:  Patient responded well to duoneb and oxygen.    COURSE & MEDICAL DECISION MAKING  Discussed with hospitalist.    Patient was given Duoneb and O2 NC.  Patient's dyspnea somewhat improved, and was started on IV methylprednisone for COPD exacerbation or reactive airway disease.  He is now stable on 3L NC saturating at 91%, but continues to wheeze.  EKG was done and showed sinus tachycardia.  Empiric ceftriaxone and doxycycline was started for possible pneumonia.  CXR showed no signs of pneumonia or hyperinflation to suggest COPD.  Troponin was collected and is pending.  No leukocytosis.  Cr somewhat increased to 1.21 from baseline of 0.80 to 1.10.  Lactate 1.8. TSH and T4 WNL.  Na 133, K 4.2.  Influenza A and B negative.  Blood cultures collected and pending.    Review nursing notes and vital signs a final time 1:39 PM    PLAN:  Patient continues to be tachycardic and dependant on O2 NC.  We will admit him for observation, while treating for reactive airway disease exacerbation.  Not currently septic, but blood cultures are pending.  Follow up with troponin to rule out cardiac disease.      CONDITION: Tachycardic, dependant on O2 NC.  No longer having tachypnea or hypoxia.    FINAL IMPRESSION  1. Acute respiratory failure with hypoxia (HCC)    2. Severe persistent reactive airway disease with wheezing with status asthmaticus        Electronically signed by: Satish Payton, 2/18/2019 1:39 PM

## 2019-02-18 NOTE — ED NOTES
Med rec updated and complete.  Allergies reviewed. Met with pt at bedside and dicussed current  Medications and last doses taken.  Friend currently at bedside.  With permission from pt dicussed with  Friend present.  No oral antibiotics in last 30 days .

## 2019-02-18 NOTE — ED TRIAGE NOTES
"PT ambulated to triage c/o increased SOB, pt reports he has been \"coughing up some stuff\".  PT has increased work of breathing.  PT roomed immediately   Chief Complaint   Patient presents with   • Shortness of Breath   • Cough     Blood pressure 144/102, pulse (!) 120, temperature 36.7 °C (98.1 °F), temperature source Temporal, resp. rate (!) 30, height 1.778 m (5' 10\"), weight 76.2 kg (167 lb 15.9 oz), SpO2 95 %.      "

## 2019-02-19 ENCOUNTER — APPOINTMENT (OUTPATIENT)
Dept: INTERNAL MEDICINE | Facility: MEDICAL CENTER | Age: 59
End: 2019-02-19
Payer: MEDICAID

## 2019-02-19 ENCOUNTER — APPOINTMENT (OUTPATIENT)
Dept: RADIOLOGY | Facility: MEDICAL CENTER | Age: 59
DRG: 202 | End: 2019-02-19
Attending: HOSPITALIST
Payer: MEDICAID

## 2019-02-19 VITALS
BODY MASS INDEX: 24.05 KG/M2 | HEART RATE: 99 BPM | RESPIRATION RATE: 18 BRPM | DIASTOLIC BLOOD PRESSURE: 79 MMHG | TEMPERATURE: 97.3 F | HEIGHT: 70 IN | SYSTOLIC BLOOD PRESSURE: 133 MMHG | OXYGEN SATURATION: 97 % | WEIGHT: 167.99 LBS

## 2019-02-19 LAB
ANION GAP SERPL CALC-SCNC: 10 MMOL/L (ref 0–11.9)
BASOPHILS # BLD AUTO: 0 % (ref 0–1.8)
BASOPHILS # BLD: 0 K/UL (ref 0–0.12)
BUN SERPL-MCNC: 23 MG/DL (ref 8–22)
CALCIUM SERPL-MCNC: 9.5 MG/DL (ref 8.5–10.5)
CHLORIDE SERPL-SCNC: 103 MMOL/L (ref 96–112)
CO2 SERPL-SCNC: 21 MMOL/L (ref 20–33)
CREAT SERPL-MCNC: 0.88 MG/DL (ref 0.5–1.4)
EOSINOPHIL # BLD AUTO: 0 K/UL (ref 0–0.51)
EOSINOPHIL NFR BLD: 0 % (ref 0–6.9)
ERYTHROCYTE [DISTWIDTH] IN BLOOD BY AUTOMATED COUNT: 42.6 FL (ref 35.9–50)
GLUCOSE SERPL-MCNC: 173 MG/DL (ref 65–99)
HCT VFR BLD AUTO: 39.8 % (ref 42–52)
HGB BLD-MCNC: 13 G/DL (ref 14–18)
IMM GRANULOCYTES # BLD AUTO: 0.02 K/UL (ref 0–0.11)
IMM GRANULOCYTES NFR BLD AUTO: 0.5 % (ref 0–0.9)
LYMPHOCYTES # BLD AUTO: 0.38 K/UL (ref 1–4.8)
LYMPHOCYTES NFR BLD: 10 % (ref 22–41)
MCH RBC QN AUTO: 29 PG (ref 27–33)
MCHC RBC AUTO-ENTMCNC: 32.7 G/DL (ref 33.7–35.3)
MCV RBC AUTO: 88.8 FL (ref 81.4–97.8)
MONOCYTES # BLD AUTO: 0.07 K/UL (ref 0–0.85)
MONOCYTES NFR BLD AUTO: 1.8 % (ref 0–13.4)
NEUTROPHILS # BLD AUTO: 3.34 K/UL (ref 1.82–7.42)
NEUTROPHILS NFR BLD: 87.7 % (ref 44–72)
NRBC # BLD AUTO: 0 K/UL
NRBC BLD-RTO: 0 /100 WBC
PLATELET # BLD AUTO: 220 K/UL (ref 164–446)
PMV BLD AUTO: 8.9 FL (ref 9–12.9)
POTASSIUM SERPL-SCNC: 3.9 MMOL/L (ref 3.6–5.5)
RBC # BLD AUTO: 4.48 M/UL (ref 4.7–6.1)
SODIUM SERPL-SCNC: 134 MMOL/L (ref 135–145)
WBC # BLD AUTO: 3.8 K/UL (ref 4.8–10.8)

## 2019-02-19 PROCEDURE — 700105 HCHG RX REV CODE 258: Performed by: STUDENT IN AN ORGANIZED HEALTH CARE EDUCATION/TRAINING PROGRAM

## 2019-02-19 PROCEDURE — A9270 NON-COVERED ITEM OR SERVICE: HCPCS | Performed by: INTERNAL MEDICINE

## 2019-02-19 PROCEDURE — 80048 BASIC METABOLIC PNL TOTAL CA: CPT

## 2019-02-19 PROCEDURE — 700111 HCHG RX REV CODE 636 W/ 250 OVERRIDE (IP): Performed by: HOSPITALIST

## 2019-02-19 PROCEDURE — 700101 HCHG RX REV CODE 250: Performed by: HOSPITALIST

## 2019-02-19 PROCEDURE — 99239 HOSP IP/OBS DSCHRG MGMT >30: CPT | Performed by: HOSPITALIST

## 2019-02-19 PROCEDURE — 700102 HCHG RX REV CODE 250 W/ 637 OVERRIDE(OP): Performed by: INTERNAL MEDICINE

## 2019-02-19 PROCEDURE — 36415 COLL VENOUS BLD VENIPUNCTURE: CPT

## 2019-02-19 PROCEDURE — 85025 COMPLETE CBC W/AUTO DIFF WBC: CPT

## 2019-02-19 PROCEDURE — 94640 AIRWAY INHALATION TREATMENT: CPT

## 2019-02-19 PROCEDURE — A9270 NON-COVERED ITEM OR SERVICE: HCPCS | Performed by: HOSPITALIST

## 2019-02-19 PROCEDURE — 700102 HCHG RX REV CODE 250 W/ 637 OVERRIDE(OP): Performed by: HOSPITALIST

## 2019-02-19 PROCEDURE — 700111 HCHG RX REV CODE 636 W/ 250 OVERRIDE (IP): Performed by: STUDENT IN AN ORGANIZED HEALTH CARE EDUCATION/TRAINING PROGRAM

## 2019-02-19 PROCEDURE — 700112 HCHG RX REV CODE 229: Performed by: HOSPITALIST

## 2019-02-19 PROCEDURE — 71046 X-RAY EXAM CHEST 2 VIEWS: CPT

## 2019-02-19 RX ORDER — DOXYCYCLINE 100 MG/1
100 TABLET ORAL EVERY 12 HOURS
Qty: 10 TAB | Refills: 0 | Status: SHIPPED | OUTPATIENT
Start: 2019-02-19 | End: 2019-02-24

## 2019-02-19 RX ORDER — IPRATROPIUM BROMIDE AND ALBUTEROL SULFATE 2.5; .5 MG/3ML; MG/3ML
3 SOLUTION RESPIRATORY (INHALATION) EVERY 6 HOURS PRN
Qty: 50 BULLET | Refills: 1 | Status: SHIPPED | OUTPATIENT
Start: 2019-02-19 | End: 2019-02-19

## 2019-02-19 RX ORDER — PREDNISONE 20 MG/1
40 TABLET ORAL DAILY
Qty: 8 TAB | Refills: 0 | Status: SHIPPED | OUTPATIENT
Start: 2019-02-19 | End: 2019-02-23

## 2019-02-19 RX ADMIN — IPRATROPIUM BROMIDE AND ALBUTEROL SULFATE 3 ML: .5; 3 SOLUTION RESPIRATORY (INHALATION) at 08:54

## 2019-02-19 RX ADMIN — MORPHINE SULFATE 30 MG: 15 TABLET, EXTENDED RELEASE ORAL at 05:59

## 2019-02-19 RX ADMIN — METHYLPREDNISOLONE SODIUM SUCCINATE 40 MG: 40 INJECTION, POWDER, FOR SOLUTION INTRAMUSCULAR; INTRAVENOUS at 12:30

## 2019-02-19 RX ADMIN — DOCUSATE SODIUM 100 MG: 100 CAPSULE, LIQUID FILLED ORAL at 05:59

## 2019-02-19 RX ADMIN — OXYCODONE HYDROCHLORIDE AND ACETAMINOPHEN 1 TABLET: 10; 325 TABLET ORAL at 12:32

## 2019-02-19 RX ADMIN — SULINDAC 150 MG: 150 TABLET ORAL at 06:00

## 2019-02-19 RX ADMIN — FAMOTIDINE 20 MG: 20 TABLET ORAL at 05:59

## 2019-02-19 RX ADMIN — PREGABALIN 75 MG: 75 CAPSULE ORAL at 06:00

## 2019-02-19 RX ADMIN — LEVOTHYROXINE SODIUM 150 MCG: 150 TABLET ORAL at 05:58

## 2019-02-19 RX ADMIN — CEFTRIAXONE SODIUM 2 G: 2 INJECTION, POWDER, FOR SOLUTION INTRAMUSCULAR; INTRAVENOUS at 06:01

## 2019-02-19 RX ADMIN — METHYLPREDNISOLONE SODIUM SUCCINATE 40 MG: 40 INJECTION, POWDER, FOR SOLUTION INTRAMUSCULAR; INTRAVENOUS at 06:02

## 2019-02-19 RX ADMIN — CYCLOBENZAPRINE 10 MG: 10 TABLET, FILM COATED ORAL at 12:32

## 2019-02-19 RX ADMIN — BUTALBITAL, ACETAMINOPHEN, AND CAFFEINE 2 TABLET: 50; 325; 40 TABLET ORAL at 06:08

## 2019-02-19 RX ADMIN — IPRATROPIUM BROMIDE AND ALBUTEROL SULFATE 3 ML: .5; 3 SOLUTION RESPIRATORY (INHALATION) at 11:41

## 2019-02-19 RX ADMIN — DOXYCYCLINE 100 MG: 100 TABLET ORAL at 05:59

## 2019-02-19 ASSESSMENT — PATIENT HEALTH QUESTIONNAIRE - PHQ9
1. LITTLE INTEREST OR PLEASURE IN DOING THINGS: NOT AT ALL
SUM OF ALL RESPONSES TO PHQ9 QUESTIONS 1 AND 2: 0
2. FEELING DOWN, DEPRESSED, IRRITABLE, OR HOPELESS: NOT AT ALL

## 2019-02-19 NOTE — DISCHARGE INSTRUCTIONS
Discharge Instructions    Discharged to home by car with friend. Discharged via wheelchair, hospital escort: Yes.  Special equipment needed: Not Applicable    Be sure to schedule a follow-up appointment with your primary care doctor or any specialists as instructed.     Discharge Plan:        I understand that a diet low in cholesterol, fat, and sodium is recommended for good health. Unless I have been given specific instructions below for another diet, I accept this instruction as my diet prescription.   Other diet: resume prior diet     Special Instructions: None    · Is patient discharged on Warfarin / Coumadin?   No     Depression / Suicide Risk    As you are discharged from this [facility] facility, it is important to learn how to keep safe from harming yourself.    Recognize the warning signs:  · Abrupt changes in personality, positive or negative- including increase in energy   · Giving away possessions  · Change in eating patterns- significant weight changes-  positive or negative  · Change in sleeping patterns- unable to sleep or sleeping all the time   · Unwillingness or inability to communicate  · Depression  · Unusual sadness, discouragement and loneliness  · Talk of wanting to die  · Neglect of personal appearance   · Rebelliousness- reckless behavior  · Withdrawal from people/activities they love  · Confusion- inability to concentrate     If you or a loved one observes any of these behaviors or has concerns about self-harm, here's what you can do:  · Talk about it- your feelings and reasons for harming yourself  · Remove any means that you might use to hurt yourself (examples: pills, rope, extension cords, firearm)  · Get professional help from the community (Mental Health, Substance Abuse, psychological counseling)  · Do not be alone:Call your Safe Contact- someone whom you trust who will be there for you.  · Call your local CRISIS HOTLINE 480-5255 or 978-344-7777  · Call your local Children's  Mobile Crisis Response Team [place] (670) 072-0169 or www.Diomics  · Call the toll free National Suicide Prevention Hotlines   · National Suicide Prevention Lifeline 023-503-TGMJ (7188)  · National Hope Line Network 800-SUICIDE (109-9603)      Antibiotic Medicine  Antibiotic medicines are used to treat infections caused by bacteria. They work by injuring or killing the bacteria that is making you sick.  HOW IS AN ANTIBIOTIC CHOSEN?  An antibiotic is chosen based on many factors. To help your health care provider choose one for you, tell your health care provider if:  · You have any allergies.  · You are pregnant or plan to get pregnant.  · You are breastfeeding.  · You are taking any medicines. These include over-the-counter medicines, prescription medicines, and herbal remedies.  · You have a medical condition or problem you have not already discussed.  Your health care provider will also consider:  · How often the medicine has to be taken.  · Common side effects of the medicine.  · The cost of the medicine.  · The taste of the medicine.  If you have questions about why an antibiotic was chosen, make sure to ask.  FOR HOW LONG SHOULD I TAKE MY ANTIBIOTIC?  Continue to take your antibiotic for as long as told by your health care provider. Do not stop taking it when you feel better. If you stop taking it too soon:  · You may start to feel sick again.  · Your infection may become harder to treat.  · Complications may develop.  WHAT IF I MISS A DOSE?  Try not to miss any doses of medicine. If you miss a dose, take it as soon as possible. However, if it is almost time for the next dose:  · If you are taking 2 doses per day, take the missed dose and the next dose 5 to 6 hours apart.  · If you are taking 3 or more doses per day, take the missed dose and the next dose 2 to 4 hours apart, then go back to the normal schedule.  If you cannot make up a missed dose, take the next scheduled dose on time. Then take  the missed dose after you have taken all the doses as recommended by your health care provider, as if you had one more dose left.  DO ANTIBIOTICS AFFECT BIRTH CONTROL?  Birth control pills may not work while you are on antibiotics. If you are taking birth control pills, continue taking them as usual and use a second form of birth control, such as a condom, to avoid unwanted pregnancy. Continue using the second form of birth control until you are finished with your current 1 month cycle of birth control pills.  OTHER INFORMATION  · If there is any medicine left over, throw it away.  · Never take someone else's antibiotics.  · Never take leftover antibiotics.  SEEK MEDICAL CARE IF:  · You get worse.  · You do not feel better within a few days of starting the antibiotic medicine.  · You vomit.  · White patches appear in your mouth.  · You have new joint pain that begins after starting the antibiotic.  · You have new muscle aches that begin after starting the antibiotic.  · You had a fever before starting the antibiotic and it returns.  · You have any symptoms of an allergic reaction, such as an itchy rash. If this happens, stop taking the antibiotic.  SEEK IMMEDIATE MEDICAL CARE IF:  · Your urine turns dark or becomes blood-colored.  · Your skin turns yellow.  · You bruise or bleed easily.  · You have severe diarrhea and abdominal cramps.  · You have a severe headache.  · You have signs of a severe allergic reaction, such as:  ¨ Trouble breathing.  ¨ Wheezing.  ¨ Swelling of the lips, tongue, or face.  ¨ Fainting.  ¨ Blisters on the skin or in the mouth.  If you have signs of a severe allergic reaction, stop taking the antibiotic right away.  This information is not intended to replace advice given to you by your health care provider. Make sure you discuss any questions you have with your health care provider.  Document Released: 08/30/2005 Document Revised: 09/07/2016 Document Reviewed: 05/04/2016  [employer]  Interactive Patient Education © 2017 [employer]

## 2019-02-19 NOTE — RESPIRATORY CARE
COPD EDUCATION by COPD CLINICAL EDUCATOR  2/19/2019 at 7:10 AM by Bisi Sommers     Patient reviewed by COPD education team. Patient does not qualify for COPD program.

## 2019-02-19 NOTE — ASSESSMENT & PLAN NOTE
Solu-Medrol 40 mg every 8 hours  RT protocol with as needed bronchodilators  Suspect viral etiology exacerbation  Supplemental oxygen to keep sats greater than 90%

## 2019-02-19 NOTE — PROGRESS NOTES
Pt AOx4, medicated for generalized pain, see MAR. On 3 LO2 via NC, RT following q4H, increased work of breathing. On regular diet, tolerating thin liquids. NS running at 75 ml/hr order is for 2 L only. Bed alarm on, call light w/in reach.

## 2019-02-19 NOTE — DISCHARGE PLANNING
Care Transition Team Assessment    Met with pt at bedside. According to pt he lives alone, independent at baseline and does not use assistive devices.    No obvious needs identified at this time, pt said that he will have either of his friends Errol or Juan provide transport at discharge.    Information Source  Orientation : Oriented x 4  Information Given By: Patient    Readmission Evaluation  Is this a readmission?: No    Interdisciplinary Discharge Planning  Does Admitting Nurse Feel This Could be a Complex Discharge?: No  Primary Care Physician: Deonte Hernandez  Lives with - Patient's Self Care Capacity: Alone and Able to Care For Self  Support Systems: Friends / Neighbors (Errol Skaggs and Juan Milan)  Do You Take your Prescribed Medications Regularly: Yes  Able to Return to Previous ADL's: Yes  Prior Services: None  Patient Expects to be Discharged to:: Home  Assistance Needed: No  Durable Medical Equipment: Not Applicable    Discharge Preparedness  What is your plan after discharge?:  (Anticipate discharge to home)  What are your discharge supports?: Other (comment) (Friends Errol Skaggs and Juan Milan)  Prior Functional Level: Ambulatory, Drives Self, Independent with Activities of Daily Living, Independent with Medication Management  Difficulity with ADLs: None  Difficulity with IADLs: None    Functional Assesment  Prior Functional Level: Ambulatory, Drives Self, Independent with Activities of Daily Living, Independent with Medication Management    Finances  Prescription Coverage: Yes    Discharge Risks or Barriers  Discharge risks or barriers?: No    Anticipated Discharge Information  Anticipated discharge disposition: Home  Discharge Address: [address] [place] [address]  Discharge Contact Phone Number: [phone]

## 2019-02-19 NOTE — CARE PLAN
Problem: Safety  Goal: Will remain free from injury  Outcome: PROGRESSING AS EXPECTED  Bed alarm on, treaded socks in use    Problem: Respiratory:  Goal: Respiratory status will improve  Outcome: PROGRESSING SLOWER THAN EXPECTED  Complains about SOB,  reads 98%

## 2019-02-19 NOTE — ED NOTES
Patient in [facility], , O2 97%, non-exertional work of breathing.  Patient states he is in pain generalized, medicated per MAR. Friend at bedside, pillow provided.

## 2019-02-19 NOTE — PROGRESS NOTES
Paged MD regarding headache. Pt says he normally takes Fioricet at home. Orders received.    Called Pharm to confirm that the contraindicated allergy message was ok to administer med because not true allergy.

## 2019-02-20 LAB
BACTERIA UR CULT: NORMAL
SIGNIFICANT IND 70042: NORMAL
SITE SITE: NORMAL
SOURCE SOURCE: NORMAL

## 2019-02-20 NOTE — DISCHARGE SUMMARY
Hospital Medicine Discharge Note     Admit Date:  2/18/2019       Discharge Date:   2/19/2019    Attending Physician:  Boone Velasquez M.D.      Diagnoses (includes active and resolved):     Active Problems:    Reactive airway disease POA: Yes    Rheumatoid arthritis (HCC) POA: Yes      Overview: Dr. Gomez    Dyslipidemia POA: Yes    Acquired hypothyroidism POA: Yes    Fibromyalgia POA: Yes    Chronic use of opiate for therapeutic purpose POA: Yes    Chronic pain of both shoulders POA: Yes    Hyponatremia POA: Unknown    Azotemia POA: Unknown          Hospital Summary (Brief Narrative):         Patient 58-year-old male history of rheumatoid arthritis, fiber myalgia, hypertension, hypothyroidism who was admitted with increased shortness of breath and nonproductive cough.  Patient had wheezing on exam and findings consistent with bronchospasm.  He is a  and states he exposed to particles.  He admitted to working without a mask and was strongly instructed to wear protective gear.  Patient was treated with IV steroids, bronchodilator therapy, empiric doxycycline for possible bacterial bronchitis.  Following day examination was markedly improved without wheezing.  He is feeling comfortable.. Patient was discharged home with 4-day course of prednisone, Atrovent added to his albuterol and complete doxycycline 5-day course for possible bronchitis.  On day of discharge I examined patient.  He had no wheezing or respiratory distress.  Discussed plan of care and findings.  Patient discharged home in stable condition.     Consultants:        None     Imaging/ Testing:      DX-CHEST-2 VIEWS   Final Result      No evidence of acute cardiopulmonary process.      DX-CHEST-PORTABLE (1 VIEW)   Final Result      No acute cardiac or pulmonary abnormality is noted.            Procedures:          None     Discharge Medications:             Medication List      START taking these medications      Instructions   doxycycline  monohydrate 100 MG tablet  Commonly known as:  ADOXA   Take 1 Tab by mouth every 12 hours for 5 days.  Dose:  100 mg     ipratropium 17 MCG/ACT Aers  Commonly known as:  ATROVENT   Inhale 2 Puffs by mouth every 6 hours.  Dose:  2 Puff     predniSONE 20 MG Tabs  Commonly known as:  DELTASONE   Take 2 Tabs by mouth every day for 4 days.  Dose:  40 mg        CONTINUE taking these medications      Instructions   acetaminophen/caffeine/butalbital 325-40-50 mg -40 MG Tabs  Commonly known as:  FIORICET   Take 1-2 Tabs by mouth every four hours as needed for Headache or Migraine.  Dose:  1-2 Tab     cyclobenzaprine 10 MG Tabs  Commonly known as:  FLEXERIL   TAKE 1 TABLET BY MOUTH TWICE DAILY AS NEEDED FOR MODERATE PAIN OR  MUSCLE SPASMS     docusate sodium 100 MG Caps  Commonly known as:  COLACE   Take 1 Cap by mouth 2 times a day.  Dose:  100 mg     levothyroxine 150 MCG Tabs  Commonly known as:  SYNTHROID   TAKE 1 TABLET BY MOUTH ONCE DAILY IN THE MORNING BEFORE BREAKFAST     LYRICA 75 MG Caps  Generic drug:  pregabalin   Take 75 mg by mouth 2 times a day.  Dose:  75 mg     morphine ER 30 MG Tbcr tablet  Commonly known as:  MS CONTIN   Take 30 mg by mouth every 12 hours.  Dose:  30 mg     oxyCODONE-acetaminophen  MG Tabs  Commonly known as:  PERCOCET-10   Take 1 Tab by mouth 3 times a day.  Dose:  1 Tab     PROVENTIL  (90 Base) MCG/ACT Aers inhalation aerosol  Generic drug:  albuterol   Inhale 2 Puffs by mouth every four hours as needed for Shortness of Breath.  Dose:  2 Puff     ranitidine 300 MG tablet  Commonly known as:  ZANTAC   TAKE 1 TABLET BY MOUTH TWICE DAILY     sulindac 150 MG Tabs  Commonly known as:  CLINORIL   Take 1 Tab by mouth 2 times a day.  Dose:  150 mg               Diet:       DIET ORDERS (Through next 24h)    Start     Ordered    02/18/19 1552  Diet Order Regular  ALL MEALS     Question:  Diet:  Answer:  Regular    02/18/19 1553            Activity:   As tolerated.      Code  status:   Full code    Primary Care Provider:    Deonte Hernandez M.D.    Follow up appointment details :      .  Deonte Hernandez M.D.  [address]  [address]  [place] [address]  [phone]    In 2 weeks      Mynor Gomez M.D.  [address] [facility] [address]  [place] [address]  [phone]    In 2 weeks      Future Appointments  Date Time Provider Department [place]   3/25/2019 10:30 AM Deonte Hernandez M.D. [facility] None           Time spent on discharge day patient visit: 40 minutes    #################################################

## 2019-02-20 NOTE — DISCHARGE PLANNING
Pt was discussed in ITD rounds. No  needs @ this time.    Care Transition Team Assessment    Information Source  Orientation : Oriented x 4  Information Given By: Patient    Readmission Evaluation  Is this a readmission?: No    Elopement Risk  Legal Hold: No  Ambulatory or Self Mobile in Wheelchair: Yes  Disoriented: No  Psychiatric Symptoms: None  History of Wandering: No  Elopement this Admit: No  Vocalizing Wanting to Leave: No  Displays Behaviors, Body Language Wanting to Leave: No-Not at Risk for Elopement  Elopement Risk: Not at Risk for Elopement    Interdisciplinary Discharge Planning  Does Admitting Nurse Feel This Could be a Complex Discharge?: No  Primary Care Physician: Deonte Hernandez  Lives with - Patient's Self Care Capacity: Alone and Able to Care For Self  Patient or legal guardian wants to designate a caregiver (see row info): No  Support Systems: Friends / Neighbors (Errol Skaggs and Juan Milan)  Do You Take your Prescribed Medications Regularly: Yes  Able to Return to Previous ADL's: Yes  Prior Services: None  Patient Expects to be Discharged to:: Home  Assistance Needed: No  Durable Medical Equipment: Not Applicable    Discharge Preparedness  What is your plan after discharge?:  (Anticipate discharge to home)  What are your discharge supports?: Other (comment) (Friends Errol Skaggs and Juan Milan)  Prior Functional Level: Ambulatory, Drives Self, Independent with Activities of Daily Living, Independent with Medication Management  Difficulity with ADLs: None  Difficulity with IADLs: None    Functional Assesment  Prior Functional Level: Ambulatory, Drives Self, Independent with Activities of Daily Living, Independent with Medication Management    Finances  Prescription Coverage: Yes              Advance Directive  Advance Directive?: None    Domestic Abuse  Have you ever been the victim of abuse or violence?: No         Discharge Risks or Barriers  Discharge risks or barriers?:  No    Anticipated Discharge Information  Anticipated discharge disposition: Home  Discharge Address: [address] [place] [address] [address], [place]  Discharge Contact Phone Number: [phone]

## 2019-04-28 DIAGNOSIS — M62.838 MUSCLE SPASM: ICD-10-CM

## 2019-04-29 RX ORDER — CYCLOBENZAPRINE HCL 10 MG
TABLET ORAL
Qty: 60 TAB | Refills: 0 | Status: SHIPPED | OUTPATIENT
Start: 2019-04-29 | End: 2022-03-16 | Stop reason: SDUPTHER

## 2019-04-29 NOTE — TELEPHONE ENCOUNTER
Last seen: 11/05/18 by Dr. Hernandez                     Next appt: 03/25/19 with Dr. Hernandez     Was the patient seen in the last year in this department? Yes   Does patient have an active prescription for medications requested? No   Received Request Via: Pharmacy

## 2019-05-28 DIAGNOSIS — K21.9 GASTROESOPHAGEAL REFLUX DISEASE, ESOPHAGITIS PRESENCE NOT SPECIFIED: ICD-10-CM

## 2019-05-29 RX ORDER — RANITIDINE 300 MG/1
TABLET ORAL
Refills: 0 | OUTPATIENT
Start: 2019-05-29

## 2019-08-13 ENCOUNTER — HOSPITAL ENCOUNTER (OUTPATIENT)
Dept: LAB | Facility: MEDICAL CENTER | Age: 59
End: 2019-08-13
Attending: STUDENT IN AN ORGANIZED HEALTH CARE EDUCATION/TRAINING PROGRAM
Payer: MEDICAID

## 2019-08-13 LAB
ALBUMIN SERPL BCP-MCNC: 3.9 G/DL (ref 3.2–4.9)
ALBUMIN/GLOB SERPL: 1.3 G/DL
ALP SERPL-CCNC: 51 U/L (ref 30–99)
ALT SERPL-CCNC: 8 U/L (ref 2–50)
ANION GAP SERPL CALC-SCNC: 8 MMOL/L (ref 0–11.9)
AST SERPL-CCNC: 12 U/L (ref 12–45)
BASOPHILS # BLD AUTO: 1.3 % (ref 0–1.8)
BASOPHILS # BLD: 0.08 K/UL (ref 0–0.12)
BILIRUB SERPL-MCNC: 0.4 MG/DL (ref 0.1–1.5)
BUN SERPL-MCNC: 15 MG/DL (ref 8–22)
CALCIUM SERPL-MCNC: 9.5 MG/DL (ref 8.5–10.5)
CHLORIDE SERPL-SCNC: 105 MMOL/L (ref 96–112)
CHOLEST SERPL-MCNC: 157 MG/DL (ref 100–199)
CO2 SERPL-SCNC: 30 MMOL/L (ref 20–33)
CREAT SERPL-MCNC: 1.23 MG/DL (ref 0.5–1.4)
CRP SERPL HS-MCNC: 0.9 MG/DL (ref 0–0.75)
EOSINOPHIL # BLD AUTO: 0.9 K/UL (ref 0–0.51)
EOSINOPHIL NFR BLD: 14.6 % (ref 0–6.9)
ERYTHROCYTE [DISTWIDTH] IN BLOOD BY AUTOMATED COUNT: 43.8 FL (ref 35.9–50)
ERYTHROCYTE [SEDIMENTATION RATE] IN BLOOD BY WESTERGREN METHOD: 11 MM/HOUR (ref 0–20)
FASTING STATUS PATIENT QL REPORTED: NORMAL
GLOBULIN SER CALC-MCNC: 3.1 G/DL (ref 1.9–3.5)
GLUCOSE SERPL-MCNC: 93 MG/DL (ref 65–99)
HCT VFR BLD AUTO: 43.8 % (ref 42–52)
HDLC SERPL-MCNC: 42 MG/DL
HGB BLD-MCNC: 13.7 G/DL (ref 14–18)
IMM GRANULOCYTES # BLD AUTO: 0.02 K/UL (ref 0–0.11)
IMM GRANULOCYTES NFR BLD AUTO: 0.3 % (ref 0–0.9)
LDLC SERPL CALC-MCNC: 93 MG/DL
LYMPHOCYTES # BLD AUTO: 1.55 K/UL (ref 1–4.8)
LYMPHOCYTES NFR BLD: 25.2 % (ref 22–41)
MCH RBC QN AUTO: 27.9 PG (ref 27–33)
MCHC RBC AUTO-ENTMCNC: 31.3 G/DL (ref 33.7–35.3)
MCV RBC AUTO: 89.2 FL (ref 81.4–97.8)
MONOCYTES # BLD AUTO: 0.44 K/UL (ref 0–0.85)
MONOCYTES NFR BLD AUTO: 7.2 % (ref 0–13.4)
NEUTROPHILS # BLD AUTO: 3.16 K/UL (ref 1.82–7.42)
NEUTROPHILS NFR BLD: 51.4 % (ref 44–72)
NRBC # BLD AUTO: 0 K/UL
NRBC BLD-RTO: 0 /100 WBC
PLATELET # BLD AUTO: 243 K/UL (ref 164–446)
PMV BLD AUTO: 9.4 FL (ref 9–12.9)
POTASSIUM SERPL-SCNC: 4.1 MMOL/L (ref 3.6–5.5)
PROT SERPL-MCNC: 7 G/DL (ref 6–8.2)
RBC # BLD AUTO: 4.91 M/UL (ref 4.7–6.1)
SODIUM SERPL-SCNC: 143 MMOL/L (ref 135–145)
TRIGL SERPL-MCNC: 111 MG/DL (ref 0–149)
WBC # BLD AUTO: 6.2 K/UL (ref 4.8–10.8)

## 2019-08-13 PROCEDURE — 85025 COMPLETE CBC W/AUTO DIFF WBC: CPT

## 2019-08-13 PROCEDURE — 85652 RBC SED RATE AUTOMATED: CPT

## 2019-08-13 PROCEDURE — 80061 LIPID PANEL: CPT

## 2019-08-13 PROCEDURE — 83036 HEMOGLOBIN GLYCOSYLATED A1C: CPT

## 2019-08-13 PROCEDURE — 86140 C-REACTIVE PROTEIN: CPT

## 2019-08-13 PROCEDURE — 36415 COLL VENOUS BLD VENIPUNCTURE: CPT

## 2019-08-13 PROCEDURE — 80053 COMPREHEN METABOLIC PANEL: CPT

## 2019-08-14 LAB
EST. AVERAGE GLUCOSE BLD GHB EST-MCNC: 111 MG/DL
HBA1C MFR BLD: 5.5 % (ref 0–5.6)

## 2019-08-20 ENCOUNTER — HOSPITAL ENCOUNTER (OUTPATIENT)
Facility: MEDICAL CENTER | Age: 59
End: 2019-08-20
Attending: NURSE PRACTITIONER
Payer: MEDICAID

## 2019-08-20 ENCOUNTER — OFFICE VISIT (OUTPATIENT)
Dept: URGENT CARE | Facility: CLINIC | Age: 59
End: 2019-08-20
Payer: MEDICAID

## 2019-08-20 VITALS
DIASTOLIC BLOOD PRESSURE: 86 MMHG | OXYGEN SATURATION: 96 % | HEART RATE: 104 BPM | SYSTOLIC BLOOD PRESSURE: 132 MMHG | TEMPERATURE: 98.9 F | HEIGHT: 70 IN | WEIGHT: 154 LBS | BODY MASS INDEX: 22.05 KG/M2 | RESPIRATION RATE: 18 BRPM

## 2019-08-20 DIAGNOSIS — H60.02 ABSCESS OF LEFT EAR CANAL: ICD-10-CM

## 2019-08-20 PROCEDURE — 87186 SC STD MICRODIL/AGAR DIL: CPT

## 2019-08-20 PROCEDURE — 87070 CULTURE OTHR SPECIMN AEROBIC: CPT

## 2019-08-20 PROCEDURE — 69020 DRG XTRNL AUD CANAL ABSCESS: CPT | Mod: LT | Performed by: NURSE PRACTITIONER

## 2019-08-20 PROCEDURE — 87077 CULTURE AEROBIC IDENTIFY: CPT

## 2019-08-20 PROCEDURE — 87205 SMEAR GRAM STAIN: CPT

## 2019-08-20 PROCEDURE — 87075 CULTR BACTERIA EXCEPT BLOOD: CPT

## 2019-08-20 RX ORDER — SULFAMETHOXAZOLE AND TRIMETHOPRIM 800; 160 MG/1; MG/1
1 TABLET ORAL 2 TIMES DAILY
Qty: 14 TAB | Refills: 0 | Status: SHIPPED | OUTPATIENT
Start: 2019-08-20 | End: 2019-08-27

## 2019-08-20 RX ORDER — CEPHALEXIN 500 MG/1
500 CAPSULE ORAL 3 TIMES DAILY
Qty: 21 CAP | Refills: 0 | Status: SHIPPED | OUTPATIENT
Start: 2019-08-20 | End: 2019-08-27

## 2019-08-20 ASSESSMENT — ENCOUNTER SYMPTOMS
NAUSEA: 0
COUGH: 0
VOMITING: 0
FEVER: 0
EYE PAIN: 0
SORE THROAT: 0
NECK PAIN: 0
DIZZINESS: 0
CHILLS: 0
SHORTNESS OF BREATH: 0
DIARRHEA: 0
MYALGIAS: 0

## 2019-08-21 LAB
GRAM STN SPEC: NORMAL
SIGNIFICANT IND 70042: NORMAL
SITE SITE: NORMAL
SOURCE SOURCE: NORMAL
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
## 2019-08-23 ENCOUNTER — TELEPHONE (OUTPATIENT)
Dept: URGENT CARE | Facility: CLINIC | Age: 59
End: 2019-08-23

## 2019-08-23 LAB
BACTERIA WND AEROBE CULT: ABNORMAL
BACTERIA WND AEROBE CULT: ABNORMAL
GRAM STN SPEC: ABNORMAL
SIGNIFICANT IND 70042: ABNORMAL
SITE SITE: ABNORMAL
SOURCE SOURCE: ABNORMAL

## 2019-08-24 LAB
BACTERIA SPEC ANAEROBE CULT: NORMAL
SIGNIFICANT IND 70042: NORMAL
SITE SITE: NORMAL
SOURCE SOURCE: NORMAL

## 2019-09-04 ENCOUNTER — OFFICE VISIT (OUTPATIENT)
Dept: URGENT CARE | Facility: CLINIC | Age: 59
End: 2019-09-04
Payer: MEDICAID

## 2019-09-04 ENCOUNTER — APPOINTMENT (OUTPATIENT)
Dept: RADIOLOGY | Facility: IMAGING CENTER | Age: 59
End: 2019-09-04
Attending: PHYSICIAN ASSISTANT
Payer: MEDICAID

## 2019-09-04 ENCOUNTER — HOSPITAL ENCOUNTER (OUTPATIENT)
Dept: LAB | Facility: MEDICAL CENTER | Age: 59
End: 2019-09-04
Attending: PHYSICIAN ASSISTANT
Payer: MEDICAID

## 2019-09-04 VITALS
WEIGHT: 155 LBS | BODY MASS INDEX: 22.19 KG/M2 | TEMPERATURE: 98.3 F | OXYGEN SATURATION: 96 % | HEART RATE: 102 BPM | DIASTOLIC BLOOD PRESSURE: 90 MMHG | HEIGHT: 70 IN | SYSTOLIC BLOOD PRESSURE: 142 MMHG | RESPIRATION RATE: 18 BRPM

## 2019-09-04 DIAGNOSIS — M79.642 LEFT HAND PAIN: ICD-10-CM

## 2019-09-04 DIAGNOSIS — M25.532 LEFT WRIST PAIN: ICD-10-CM

## 2019-09-04 DIAGNOSIS — M25.40 SWOLLEN JOINT: ICD-10-CM

## 2019-09-04 DIAGNOSIS — M79.641 RIGHT HAND PAIN: ICD-10-CM

## 2019-09-04 DIAGNOSIS — M25.531 RIGHT WRIST PAIN: ICD-10-CM

## 2019-09-04 LAB
BASOPHILS # BLD AUTO: 1.1 % (ref 0–1.8)
BASOPHILS # BLD: 0.09 K/UL (ref 0–0.12)
EOSINOPHIL # BLD AUTO: 1.01 K/UL (ref 0–0.51)
EOSINOPHIL NFR BLD: 12.2 % (ref 0–6.9)
ERYTHROCYTE [DISTWIDTH] IN BLOOD BY AUTOMATED COUNT: 42.7 FL (ref 35.9–50)
HCT VFR BLD AUTO: 39.7 % (ref 42–52)
HGB BLD-MCNC: 12.2 G/DL (ref 14–18)
IMM GRANULOCYTES # BLD AUTO: 0.02 K/UL (ref 0–0.11)
IMM GRANULOCYTES NFR BLD AUTO: 0.2 % (ref 0–0.9)
LYMPHOCYTES # BLD AUTO: 1.93 K/UL (ref 1–4.8)
LYMPHOCYTES NFR BLD: 23.4 % (ref 22–41)
MCH RBC QN AUTO: 27.5 PG (ref 27–33)
MCHC RBC AUTO-ENTMCNC: 30.7 G/DL (ref 33.7–35.3)
MCV RBC AUTO: 89.6 FL (ref 81.4–97.8)
MONOCYTES # BLD AUTO: 0.74 K/UL (ref 0–0.85)
MONOCYTES NFR BLD AUTO: 9 % (ref 0–13.4)
NEUTROPHILS # BLD AUTO: 4.46 K/UL (ref 1.82–7.42)
NEUTROPHILS NFR BLD: 54.1 % (ref 44–72)
NRBC # BLD AUTO: 0 K/UL
NRBC BLD-RTO: 0 /100 WBC
PLATELET # BLD AUTO: 299 K/UL (ref 164–446)
PMV BLD AUTO: 9 FL (ref 9–12.9)
RBC # BLD AUTO: 4.43 M/UL (ref 4.7–6.1)
URATE SERPL-MCNC: 4.3 MG/DL (ref 2.5–8.3)
WBC # BLD AUTO: 8.3 K/UL (ref 4.8–10.8)

## 2019-09-04 PROCEDURE — 73130 X-RAY EXAM OF HAND: CPT | Mod: LT | Performed by: FAMILY MEDICINE

## 2019-09-04 PROCEDURE — 73110 X-RAY EXAM OF WRIST: CPT | Mod: LT | Performed by: FAMILY MEDICINE

## 2019-09-04 PROCEDURE — 99214 OFFICE O/P EST MOD 30 MIN: CPT | Mod: 25 | Performed by: PHYSICIAN ASSISTANT

## 2019-09-04 PROCEDURE — 36415 COLL VENOUS BLD VENIPUNCTURE: CPT

## 2019-09-04 PROCEDURE — 84550 ASSAY OF BLOOD/URIC ACID: CPT

## 2019-09-04 PROCEDURE — 85025 COMPLETE CBC W/AUTO DIFF WBC: CPT

## 2019-09-04 RX ORDER — KETOROLAC TROMETHAMINE 30 MG/ML
30 INJECTION, SOLUTION INTRAMUSCULAR; INTRAVENOUS ONCE
Status: COMPLETED | OUTPATIENT
Start: 2019-09-04 | End: 2019-09-04

## 2019-09-04 RX ADMIN — KETOROLAC TROMETHAMINE 30 MG: 30 INJECTION, SOLUTION INTRAMUSCULAR; INTRAVENOUS at 17:03

## 2019-09-05 DIAGNOSIS — M25.532 LEFT WRIST PAIN: ICD-10-CM

## 2019-09-05 RX ORDER — MELOXICAM 7.5 MG/1
7.5 TABLET ORAL DAILY
Qty: 14 TAB | Refills: 0 | Status: SHIPPED | OUTPATIENT
Start: 2019-09-05 | End: 2023-08-08

## 2019-09-05 NOTE — PROGRESS NOTES
Mobic was written for this patient after labs reviewed. He is to follow up with PCP or pain management ASAP for recheck.

## 2019-09-07 ENCOUNTER — TELEPHONE (OUTPATIENT)
Dept: URGENT CARE | Facility: CLINIC | Age: 59
End: 2019-09-07

## 2019-09-07 ASSESSMENT — ENCOUNTER SYMPTOMS
CHILLS: 0
SENSORY CHANGE: 0
FEVER: 0
FALLS: 0
MUSCLE WEAKNESS: 0
BACK PAIN: 0
TINGLING: 0
NUMBNESS: 0

## 2019-09-07 NOTE — PROGRESS NOTES
"Subjective:      Christophe Leary is a 59 y.o. male who presents with Wrist Injury ((L)swollen and hurts-x2 days- hx of compound fracture,something pops in and out)            Patient is a 59-year-old male who presents to urgent care with left wrist pain, swelling and slight redness for the last 2 days.  He does report history of significant compound fracture to the wrist more than 30 years ago of which he does report slight discomfort and prior deformity.  He also reports history of RA however he is never had swelling to the wrist joint mainly to his finger joints.  He denies new trauma, injury or fall.  He has been taking his standard pain medication written by [employer] without relief his symptoms.  He denies any fevers or chills.  He also denies significant painful movement of the wrist or hand.    Wrist Injury    The incident occurred 2 days ago. There was no injury mechanism. Pain location: Left wrist and hand. The quality of the pain is described as aching. The pain is moderate. The pain has been constant since the incident. Pertinent negatives include no muscle weakness, numbness or tingling. The symptoms are aggravated by palpation, lifting and movement. He has tried ice and acetaminophen (Currently on pain management takes opiates) for the symptoms. The treatment provided no relief.       Review of Systems   Constitutional: Negative for chills and fever.   Musculoskeletal: Positive for joint pain. Negative for back pain and falls.   Skin: Negative for itching and rash.   Neurological: Negative for tingling, sensory change and numbness.   All other systems reviewed and are negative.         Objective:     /90 (BP Location: Right arm)   Pulse (!) 102   Temp 36.8 °C (98.3 °F) (Temporal)   Resp 18   Ht 1.778 m (5' 10\")   Wt 70.3 kg (155 lb)   SpO2 96%   BMI 22.24 kg/m²    PMH:  has a past medical history of Fibromyalgia, GERD (gastroesophageal reflux disease), Hypertension, Hypothyroidism, " and Rheumatoid arthritis(714.0).  MEDS:   Current Outpatient Medications:   •  meloxicam (MOBIC) 7.5 MG Tab, Take 1 Tab by mouth every day. Avoid other NSAID use., Disp: 14 Tab, Rfl: 0  •  cyclobenzaprine (FLEXERIL) 10 MG Tab, TAKE 1 TABLET BY MOUTH TWICE DAILY AS NEEDED FOR MODERATE PAIN OR  MUSCLE SPASMS, Disp: 60 Tab, Rfl: 0  •  levothyroxine (SYNTHROID) 150 MCG Tab, TAKE 1 TABLET BY MOUTH ONCE DAILY IN THE MORNING BEFORE BREAKFAST, Disp: 30 Tab, Rfl: 4  •  ipratropium (ATROVENT) 17 MCG/ACT Aero Soln, Inhale 2 Puffs by mouth every 6 hours., Disp: 1 Inhaler, Rfl: 3  •  oxyCODONE-acetaminophen (PERCOCET-10)  MG Tab, Take 1 Tab by mouth 3 times a day., Disp: , Rfl:   •  PROVENTIL  (90 Base) MCG/ACT Aero Soln inhalation aerosol, Inhale 2 Puffs by mouth every four hours as needed for Shortness of Breath., Disp: , Rfl:   •  ranitidine (ZANTAC) 300 MG tablet, TAKE 1 TABLET BY MOUTH TWICE DAILY, Disp: 180 Tab, Rfl: 0  •  morphine ER (MS CONTIN) 30 MG Tab CR tablet, Take 30 mg by mouth every 12 hours., Disp: , Rfl:   ALLERGIES:   Allergies   Allergen Reactions   • Cymbalta [Duloxetine Hcl]      Extreme fatigue      SURGHX:   Past Surgical History:   Procedure Laterality Date   • KNEE ARTHROPLASTY TOTAL  2013    right   • OTHER ORTHOPEDIC SURGERY      knees, elbow, wrist     SOCHX:  reports that he has never smoked. He has never used smokeless tobacco. He reports that he drinks alcohol. He reports that he does not use drugs.  FH: Family history was reviewed, no pertinent findings to report    Physical Exam   Constitutional: He is oriented to person, place, and time. He appears well-developed and well-nourished. No distress.   HENT:   Head: Normocephalic and atraumatic.   Eyes: Pupils are equal, round, and reactive to light. Conjunctivae and EOM are normal.   Neck: Normal range of motion. Neck supple. No tracheal deviation present.   Cardiovascular: Normal rate.   No murmur heard.  Pulmonary/Chest: Effort  normal. No respiratory distress.   Musculoskeletal:        Left wrist: He exhibits decreased range of motion, tenderness, bony tenderness, swelling and deformity. He exhibits no crepitus.        Arms:       Left hand: He exhibits tenderness, bony tenderness and swelling. Decreased sensation noted. Normal strength noted.   Neurological: He is alert and oriented to person, place, and time. Coordination normal.   Skin: Skin is warm. No rash noted. There is erythema.   Psychiatric: He has a normal mood and affect. His behavior is normal. Judgment and thought content normal.   Vitals reviewed.              Assessment/Plan:     1. Left wrist pain  - DX-WRIST-COMPLETE 3+ LEFT; Future  - DX-HAND 3+ LEFT; Future  - CBC WITH DIFFERENTIAL; Future  - URIC ACID, SERUM    2. Left hand pain  - DX-WRIST-COMPLETE 3+ LEFT; Future  - DX-HAND 3+ LEFT; Future  - CBC WITH DIFFERENTIAL; Future  - URIC ACID, SERUM    3. Swollen joint  - CBC WITH DIFFERENTIAL; Future  - URIC ACID, SERUM  - ketorolac (TORADOL) injection 30 mg    Pt is with otherwise unchanged wrist and hand xrays- without noted bony erosion to areas of redness and swelling- will pursue the above labs- R/O gout or noted cellulitis vs. Septic joint.   Pt. Was given Toradol in clinic- will follow up with this patient as results return.   Patient given precautionary s/sx that mandate immediate follow up and evaluation in the ED. Advised of risks of not doing so.    DDX, Supportive care, and indications for immediate follow-up discussed with patient.    Instructed to return to clinic or nearest emergency department if we are not available for any change in condition, further concerns, or worsening of symptoms.    The patient demonstrated a good understanding and agreed with the treatment plan.  Please note that this dictation was created using voice recognition software. I have made every reasonable attempt to correct obvious errors, but I expect that there are errors of grammar  and possibly content that I did not discover before finalizing the note.

## 2019-09-07 NOTE — TELEPHONE ENCOUNTER
----- Message from Toan Mehta P.A.-C. sent at 9/5/2019  8:14 AM PDT -----  Please alert this patient of normal uric acid levels, and normal white count- at this time I suspect inflammatory arthritis.   I would like to send a medication to his pharmacy that he takes 1/daily.   He is to follow up with his primary care provider or pain management ASAP.   Thanks!  Toan

## 2019-09-07 NOTE — TELEPHONE ENCOUNTER
Phone Number called: [phone] (home)   Message: Left pt a mess to call back regarding test results.

## 2019-09-07 NOTE — TELEPHONE ENCOUNTER
Pt returned the call and I informed about his test results.   Pt will  his rx and schedule an quintin with his PCP in the near future.

## 2019-11-25 ENCOUNTER — HOSPITAL ENCOUNTER (OUTPATIENT)
Dept: RADIOLOGY | Facility: MEDICAL CENTER | Age: 59
End: 2019-11-25
Attending: PHYSICAL MEDICINE & REHABILITATION
Payer: MEDICAID

## 2019-11-25 DIAGNOSIS — M54.16 LUMBAR RADICULOPATHY: ICD-10-CM

## 2019-11-25 PROCEDURE — 72148 MRI LUMBAR SPINE W/O DYE: CPT

## 2021-05-26 ENCOUNTER — HOSPITAL ENCOUNTER (OUTPATIENT)
Dept: LAB | Facility: MEDICAL CENTER | Age: 61
End: 2021-05-26
Attending: STUDENT IN AN ORGANIZED HEALTH CARE EDUCATION/TRAINING PROGRAM
Payer: MEDICAID

## 2021-05-26 ENCOUNTER — HOSPITAL ENCOUNTER (OUTPATIENT)
Dept: LAB | Facility: MEDICAL CENTER | Age: 61
End: 2021-05-26
Attending: INTERNAL MEDICINE
Payer: MEDICAID

## 2021-05-26 LAB
ANION GAP SERPL CALC-SCNC: 10 MMOL/L (ref 7–16)
APPEARANCE UR: CLEAR
BASOPHILS # BLD AUTO: 0.6 % (ref 0–1.8)
BASOPHILS # BLD: 0.04 K/UL (ref 0–0.12)
BILIRUB UR QL STRIP.AUTO: NEGATIVE
BUN SERPL-MCNC: 17 MG/DL (ref 8–22)
CALCIUM SERPL-MCNC: 9.4 MG/DL (ref 8.5–10.5)
CHLORIDE SERPL-SCNC: 102 MMOL/L (ref 96–112)
CHOLEST SERPL-MCNC: 158 MG/DL (ref 100–199)
CO2 SERPL-SCNC: 28 MMOL/L (ref 20–33)
COLOR UR: YELLOW
CREAT SERPL-MCNC: 0.97 MG/DL (ref 0.5–1.4)
EOSINOPHIL # BLD AUTO: 0.12 K/UL (ref 0–0.51)
EOSINOPHIL NFR BLD: 1.8 % (ref 0–6.9)
ERYTHROCYTE [DISTWIDTH] IN BLOOD BY AUTOMATED COUNT: 42.7 FL (ref 35.9–50)
GLUCOSE SERPL-MCNC: 99 MG/DL (ref 65–99)
GLUCOSE UR STRIP.AUTO-MCNC: NEGATIVE MG/DL
HCT VFR BLD AUTO: 40.8 % (ref 42–52)
HDLC SERPL-MCNC: 46 MG/DL
HGB BLD-MCNC: 12.6 G/DL (ref 14–18)
IMM GRANULOCYTES # BLD AUTO: 0.02 K/UL (ref 0–0.11)
IMM GRANULOCYTES NFR BLD AUTO: 0.3 % (ref 0–0.9)
KETONES UR STRIP.AUTO-MCNC: NEGATIVE MG/DL
LDLC SERPL CALC-MCNC: 99 MG/DL
LEUKOCYTE ESTERASE UR QL STRIP.AUTO: NEGATIVE
LYMPHOCYTES # BLD AUTO: 1.41 K/UL (ref 1–4.8)
LYMPHOCYTES NFR BLD: 21.2 % (ref 22–41)
MCH RBC QN AUTO: 26.8 PG (ref 27–33)
MCHC RBC AUTO-ENTMCNC: 30.9 G/DL (ref 33.7–35.3)
MCV RBC AUTO: 86.6 FL (ref 81.4–97.8)
MICRO URNS: NORMAL
MONOCYTES # BLD AUTO: 0.47 K/UL (ref 0–0.85)
MONOCYTES NFR BLD AUTO: 7.1 % (ref 0–13.4)
NEUTROPHILS # BLD AUTO: 4.59 K/UL (ref 1.82–7.42)
NEUTROPHILS NFR BLD: 69 % (ref 44–72)
NITRITE UR QL STRIP.AUTO: NEGATIVE
NRBC # BLD AUTO: 0 K/UL
NRBC BLD-RTO: 0 /100 WBC
PH UR STRIP.AUTO: 5.5 [PH] (ref 5–8)
PLATELET # BLD AUTO: 405 K/UL (ref 164–446)
PMV BLD AUTO: 8.9 FL (ref 9–12.9)
POTASSIUM SERPL-SCNC: 4.5 MMOL/L (ref 3.6–5.5)
PROT UR QL STRIP: NEGATIVE MG/DL
RBC # BLD AUTO: 4.71 M/UL (ref 4.7–6.1)
RBC UR QL AUTO: NEGATIVE
SODIUM SERPL-SCNC: 140 MMOL/L (ref 135–145)
SP GR UR STRIP.AUTO: 1.03
TRIGL SERPL-MCNC: 64 MG/DL (ref 0–149)
UROBILINOGEN UR STRIP.AUTO-MCNC: 0.2 MG/DL
WBC # BLD AUTO: 6.7 K/UL (ref 4.8–10.8)

## 2021-05-26 PROCEDURE — 80048 BASIC METABOLIC PNL TOTAL CA: CPT

## 2021-05-26 PROCEDURE — 82043 UR ALBUMIN QUANTITATIVE: CPT

## 2021-05-26 PROCEDURE — 86431 RHEUMATOID FACTOR QUANT: CPT

## 2021-05-26 PROCEDURE — 80061 LIPID PANEL: CPT

## 2021-05-26 PROCEDURE — 81003 URINALYSIS AUTO W/O SCOPE: CPT

## 2021-05-26 PROCEDURE — 36415 COLL VENOUS BLD VENIPUNCTURE: CPT

## 2021-05-26 PROCEDURE — 86140 C-REACTIVE PROTEIN: CPT

## 2021-05-26 PROCEDURE — 82570 ASSAY OF URINE CREATININE: CPT

## 2021-05-26 PROCEDURE — 85025 COMPLETE CBC W/AUTO DIFF WBC: CPT

## 2021-05-26 PROCEDURE — 85652 RBC SED RATE AUTOMATED: CPT

## 2021-05-26 PROCEDURE — 84443 ASSAY THYROID STIM HORMONE: CPT

## 2021-05-27 LAB
CREAT UR-MCNC: 241.53 MG/DL
CRP SERPL HS-MCNC: 3.13 MG/DL (ref 0–0.75)
ERYTHROCYTE [SEDIMENTATION RATE] IN BLOOD BY WESTERGREN METHOD: 17 MM/HOUR (ref 0–20)
MICROALBUMIN UR-MCNC: <1.2 MG/DL
MICROALBUMIN/CREAT UR: NORMAL MG/G (ref 0–30)
RHEUMATOID FACT SER IA-ACNC: 282 IU/ML (ref 0–14)
TSH SERPL DL<=0.005 MIU/L-ACNC: 4.62 UIU/ML (ref 0.38–5.33)

## 2021-07-19 ENCOUNTER — APPOINTMENT (OUTPATIENT)
Dept: RADIOLOGY | Facility: MEDICAL CENTER | Age: 61
End: 2021-07-19
Attending: PAIN MEDICINE
Payer: MEDICAID

## 2021-07-19 DIAGNOSIS — G89.29 CHRONIC NECK PAIN: ICD-10-CM

## 2021-07-19 DIAGNOSIS — M54.2 CHRONIC NECK PAIN: ICD-10-CM

## 2021-07-19 PROCEDURE — 72141 MRI NECK SPINE W/O DYE: CPT

## 2022-03-16 DIAGNOSIS — M62.838 MUSCLE SPASM: ICD-10-CM

## 2022-03-16 RX ORDER — CYCLOBENZAPRINE HCL 10 MG
10 TABLET ORAL
Qty: 30 TABLET | Refills: 1 | Status: SHIPPED | OUTPATIENT
Start: 2022-03-16 | End: 2023-03-03

## 2022-03-17 NOTE — TELEPHONE ENCOUNTER
Last seen: 05/26/21 by Dr. Cadena   Next appt: None     Was the patient seen in the last year in this department? Yes   Does patient have an active prescription for medications requested? No   Received Request Via: Pharmacy

## 2022-06-24 RX ORDER — LEVOTHYROXINE SODIUM 0.15 MG/1
TABLET ORAL
Qty: 90 TABLET | Refills: 3 | Status: SHIPPED | OUTPATIENT
Start: 2022-06-24 | End: 2023-09-21 | Stop reason: SDUPTHER

## 2022-06-24 NOTE — TELEPHONE ENCOUNTER
Last seen: unknown in last system  by D Next appt:  None with Dr. mott    Was the patient seen in the last year in this department? Yes   Does patient have an active prescription for medications requested? No   Received Request Via: Pharmacy

## 2023-03-03 ENCOUNTER — HOSPITAL ENCOUNTER (EMERGENCY)
Facility: MEDICAL CENTER | Age: 63
End: 2023-03-03
Attending: EMERGENCY MEDICINE
Payer: MEDICAID

## 2023-03-03 VITALS
BODY MASS INDEX: 22.9 KG/M2 | TEMPERATURE: 97.5 F | SYSTOLIC BLOOD PRESSURE: 181 MMHG | OXYGEN SATURATION: 98 % | HEART RATE: 70 BPM | RESPIRATION RATE: 16 BRPM | DIASTOLIC BLOOD PRESSURE: 97 MMHG | WEIGHT: 160 LBS | HEIGHT: 70 IN

## 2023-03-03 DIAGNOSIS — S39.012A STRAIN OF LUMBAR REGION, INITIAL ENCOUNTER: ICD-10-CM

## 2023-03-03 PROCEDURE — 700111 HCHG RX REV CODE 636 W/ 250 OVERRIDE (IP): Performed by: EMERGENCY MEDICINE

## 2023-03-03 PROCEDURE — 96372 THER/PROPH/DIAG INJ SC/IM: CPT

## 2023-03-03 PROCEDURE — 700102 HCHG RX REV CODE 250 W/ 637 OVERRIDE(OP): Performed by: EMERGENCY MEDICINE

## 2023-03-03 PROCEDURE — A9270 NON-COVERED ITEM OR SERVICE: HCPCS | Performed by: EMERGENCY MEDICINE

## 2023-03-03 PROCEDURE — 99283 EMERGENCY DEPT VISIT LOW MDM: CPT

## 2023-03-03 RX ORDER — CYCLOBENZAPRINE HCL 10 MG
10 TABLET ORAL ONCE
Status: COMPLETED | OUTPATIENT
Start: 2023-03-03 | End: 2023-03-03

## 2023-03-03 RX ORDER — CYCLOBENZAPRINE HCL 5 MG
5-10 TABLET ORAL 3 TIMES DAILY PRN
Qty: 30 TABLET | Refills: 0 | Status: SHIPPED | OUTPATIENT
Start: 2023-03-03 | End: 2023-08-08

## 2023-03-03 RX ORDER — METHYLPREDNISOLONE 4 MG/1
TABLET ORAL
Qty: 21 EACH | Refills: 0 | Status: SHIPPED | OUTPATIENT
Start: 2023-03-03 | End: 2023-08-08

## 2023-03-03 RX ORDER — KETOROLAC TROMETHAMINE 30 MG/ML
30 INJECTION, SOLUTION INTRAMUSCULAR; INTRAVENOUS ONCE
Status: COMPLETED | OUTPATIENT
Start: 2023-03-03 | End: 2023-03-03

## 2023-03-03 RX ADMIN — KETOROLAC TROMETHAMINE 30 MG: 30 INJECTION, SOLUTION INTRAMUSCULAR at 13:35

## 2023-03-03 RX ADMIN — CYCLOBENZAPRINE 10 MG: 10 TABLET, FILM COATED ORAL at 13:35

## 2023-03-03 NOTE — ED PROVIDER NOTES
ED Provider Note    CHIEF COMPLAINT  Chief Complaint   Patient presents with    Back Pain     Lower - lifting heavy object       EXTERNAL RECORDS REVIEWED  Outpatient Notes patient on chronic narcotics    HPI/ROS  LIMITATION TO HISTORY   Select: : None  OUTSIDE HISTORIAN(S):  None    Christophe Leary is a 62 y.o. male who presents to the ED with low back pain.  The patient has a history of chronic narcotics, the patient states he lifted something heavy felt a sharp pain on his right lower back, does radiate down his leg, seems to be progressively been getting worse, no abdominal pains, no numbness, tingling, weakness.    PAST MEDICAL HISTORY   has a past medical history of Fibromyalgia, GERD (gastroesophageal reflux disease), Hypertension, Hypothyroidism, and Rheumatoid arthritis(714.0).    SURGICAL HISTORY   has a past surgical history that includes other orthopedic surgery and knee arthroplasty total (2013).    FAMILY HISTORY  Family History   Problem Relation Age of Onset    Heart Disease Father        SOCIAL HISTORY  Social History     Tobacco Use    Smoking status: Never    Smokeless tobacco: Never   Substance and Sexual Activity    Alcohol use: Yes     Alcohol/week: 0.0 oz     Comment: rarely    Drug use: No     Comment: METH AMPHETAMINE 20 YRS AGO    Sexual activity: Yes     Partners: Female       CURRENT MEDICATIONS  Home Medications       Reviewed by Matilda French R.N. (Registered Nurse) on 03/03/23 at 1132  Med List Status: Not Addressed     Medication Last Dose Status   cyclobenzaprine (FLEXERIL) 10 mg Tab  Active   ipratropium (ATROVENT) 17 MCG/ACT Aero Soln  Active   levothyroxine (SYNTHROID) 150 MCG Tab  Active   meloxicam (MOBIC) 7.5 MG Tab  Active   morphine ER (MS CONTIN) 30 MG Tab CR tablet  Active   oxyCODONE-acetaminophen (PERCOCET-10)  MG Tab  Active   PROVENTIL  (90 Base) MCG/ACT Aero Soln inhalation aerosol  Active   ranitidine (ZANTAC) 300 MG tablet  Active               "      ALLERGIES  No Known Allergies    PHYSICAL EXAM  VITAL SIGNS: BP (!) 168/93   Pulse 73   Temp 36.6 °C (97.8 °F) (Temporal)   Resp 16   Ht 1.778 m (5' 10\")   Wt 72.6 kg (160 lb)   SpO2 98%   BMI 22.96 kg/m²    Constitutional: , Mild distress,    HENT: Normocephalic, Atraumatic.  Oropharynx moist.   Eyes: EOMI, Conjunctiva normal, No discharge.   Back: Tenderness palpation on the right upper lumbar area.  No soft tissue swelling, no rashes  Skin: Warm, Dry, No rash noted    Musculoskeletal: No major deformities noted.   Neurologic: Awake, alert. Moves all extremities spontaneously.  Psychiatric: Affect normal, Mood normal.       INITIAL ASSESSMENT, COURSE AND PLAN  Care Narrative: Patient is coming in with low back strain, I do not see any red flags, reviewed the patient's  and the patient is on chronic narcotics which the patient did not disclose to me in the beginning.  Discussed with him that I cannot prescribe him any narcotic pain medicines.  We will give the patient a shot of Toradol here, will give the patient some Flexeril, put the patient on Flexeril and a Medrol Dosepak, have the patient return with worsening symptoms.        ADDITIONAL PROBLEM LIST  Chronic opiates  DISPOSITION AND DISCUSSIONS  Escalation of care considered, and ultimately not performed:diagnostic imaging    Decision tools and prescription drugs considered including, but not limited to: Pain Medications     The patient will return for new or worsening symptoms and is stable at the time of discharge.    The patient is referred to a primary physician for blood pressure management, diabetic screening, and for all other preventative health concerns.        DISPOSITION:  Patient will be discharged home in stable condition.    FOLLOW UP:  [facility], Emergency Dept  [address] [address] [phone]    If symptoms worsen, In 1-2 days for recheck if not improved.      OUTPATIENT " MEDICATIONS:  New Prescriptions    CYCLOBENZAPRINE (FLEXERIL) 5 MG TABLET    Take 1-2 Tablets by mouth 3 times a day as needed for Moderate Pain.    METHYLPREDNISOLONE (MEDROL DOSEPAK) 4 MG TABLET THERAPY PACK    Use as directed     .    FINAL DIAGNOSIS  1. Strain of lumbar region, initial encounter           Electronically signed by: David Lawrence M.D., 3/3/2023 1:14 PM

## 2023-03-03 NOTE — ED TRIAGE NOTES
"Chief Complaint   Patient presents with    Back Pain     Lower - lifting heavy object     Pt ambulatory to triage for complaints of lower back pain after lifting a heavy trash can 3 days ago.     Pt is alert and oriented, speaking in full sentences, follows commands and responds appropriately to questions.      Pt placed in lobby. Pt educated on triage process and apologized for wait time. Pt encouraged to alert staff for any changes.     Patient and staff wearing appropriate PPE      BP (!) 168/93   Pulse 73   Temp 36.6 °C (97.8 °F) (Temporal)   Resp 16   Ht 1.778 m (5' 10\")   Wt 72.6 kg (160 lb)   SpO2 98%       "

## 2023-05-07 ENCOUNTER — OFFICE VISIT (OUTPATIENT)
Dept: URGENT CARE | Facility: PHYSICIAN GROUP | Age: 63
End: 2023-05-07
Payer: MEDICAID

## 2023-05-07 VITALS
RESPIRATION RATE: 18 BRPM | DIASTOLIC BLOOD PRESSURE: 80 MMHG | TEMPERATURE: 96.5 F | OXYGEN SATURATION: 94 % | WEIGHT: 157 LBS | SYSTOLIC BLOOD PRESSURE: 130 MMHG | BODY MASS INDEX: 22.48 KG/M2 | HEIGHT: 70 IN | HEART RATE: 84 BPM

## 2023-05-07 DIAGNOSIS — S05.01XA ABRASION OF RIGHT CORNEA, INITIAL ENCOUNTER: ICD-10-CM

## 2023-05-07 PROCEDURE — 99213 OFFICE O/P EST LOW 20 MIN: CPT | Performed by: NURSE PRACTITIONER

## 2023-05-07 RX ORDER — ERYTHROMYCIN 5 MG/G
1 OINTMENT OPHTHALMIC 4 TIMES DAILY
Qty: 3.5 G | Refills: 0 | Status: SHIPPED | OUTPATIENT
Start: 2023-05-07 | End: 2023-05-17

## 2023-05-07 ASSESSMENT — ENCOUNTER SYMPTOMS
RESPIRATORY NEGATIVE: 1
GASTROINTESTINAL NEGATIVE: 1
EYE DISCHARGE: 0
BLURRED VISION: 1
FOREIGN BODY SENSATION: 1
CONSTITUTIONAL NEGATIVE: 1
FEVER: 0
DOUBLE VISION: 0
CARDIOVASCULAR NEGATIVE: 1
EYE PAIN: 1

## 2023-05-07 NOTE — PROGRESS NOTES
"Subjective:   Christophe Leary is a 63 y.o. male who presents for Eye Injury (Pt states he was wearing glasses but still thinks a metal got in his (R) eye. Pt states eye is swollen and pretty painful)      Patient presents for evaluation of foreign body sensation in the right eye beginning yesterday after he believes a piece of metal got in it from the side.  Patient was wearing safety glasses at the time. Patient states that he did flush the eye out immediately, and he did feel better. Then after his shower last night he felt like more of the foreign body came out and felt better.  Unfortunately, he woke up at 4:00 am this morning with worsening pain to the eye.  He went back to sleep after some time and woke with pain, which did get better after flushing, but now reports worsening pain again.  He reports blurred vision, but denies double vision.      Eye Injury   The right eye is affected. This is a new problem. The current episode started yesterday. The problem occurs constantly. The problem has been waxing and waning. Injury mechanism: Piece of metal/welding. The pain is at a severity of 5/10. The pain is moderate. There is No known exposure to pink eye. He Does not wear contacts. Associated symptoms include blurred vision and a foreign body sensation. Pertinent negatives include no eye discharge, double vision or fever. He has tried commercial eye wash for the symptoms. The treatment provided mild relief.     Review of Systems   Constitutional: Negative.  Negative for fever.   HENT: Negative.     Eyes:  Positive for blurred vision and pain. Negative for double vision and discharge.   Respiratory: Negative.     Cardiovascular: Negative.    Gastrointestinal: Negative.    Skin: Negative.      Medications, Allergies, and current problem list reviewed today in Epic.     Objective:     /80   Pulse 84   Temp 35.8 °C (96.5 °F) (Temporal)   Resp 18   Ht 1.778 m (5' 10\")   Wt 71.2 kg (157 lb)   SpO2 94% "     Physical Exam  Vitals reviewed.   Constitutional:       Appearance: Normal appearance.   HENT:      Head: Normocephalic and atraumatic.      Nose: Nose normal.      Mouth/Throat:      Mouth: Mucous membranes are moist.      Pharynx: Oropharynx is clear.   Eyes:      General: Lids are normal. Allergic shiner present. No visual field deficit or scleral icterus.     Extraocular Movements: Extraocular movements intact.      Conjunctiva/sclera:      Right eye: Right conjunctiva is injected.      Pupils: Pupils are equal, round, and reactive to light.        Comments: On fluorescein staining, patient does have an area of fluorescence in the right conjunctiva and scleral area at about nine o'clock.  This area is about 2-3 mm.  No foreign body is identified.  There is scleral injection to the right eye.  Patient's eye was flushed with normal saline afterward.     Cardiovascular:      Rate and Rhythm: Normal rate and regular rhythm.      Pulses: Normal pulses.      Heart sounds: Normal heart sounds.   Pulmonary:      Effort: Pulmonary effort is normal.      Breath sounds: Normal breath sounds.   Abdominal:      General: Abdomen is flat. Bowel sounds are normal.      Palpations: Abdomen is soft.   Musculoskeletal:         General: Normal range of motion.      Cervical back: Normal range of motion and neck supple.   Skin:     General: Skin is warm and dry.      Capillary Refill: Capillary refill takes less than 2 seconds.   Neurological:      General: No focal deficit present.      Mental Status: He is alert and oriented to person, place, and time.   Psychiatric:         Mood and Affect: Mood normal.         Behavior: Behavior normal.       Assessment/Plan:     Diagnosis and associated orders:     1. Abrasion of right cornea, initial encounter  erythromycin 5 MG/GM Ointment         Comments/MDM:     Patient will be treated with erythromycin ophthalmic ointment. He was given strict ER precautions that if he feels his vision  worsen, or his pain/foreign body sensation worsen despite the antibiotic ointment, he should proceed to the ER for further evaluation and treatment. Otherwise, he will return here in 3 to 4 days for reevaluation.  Patient verbalizes understanding and agrees with the plan.          Differential diagnosis, natural history, supportive care, and indications for immediate follow-up discussed.    Advised the patient to follow-up with the primary care physician for recheck, reevaluation, and consideration of further management.    Please note that this dictation was created using voice recognition software. I have made a reasonable attempt to correct obvious errors, but I expect that there are errors of grammar and possibly content that I did not discover before finalizing the note.    This note was electronically signed by ELLEN Boswell

## 2023-06-14 ENCOUNTER — HOSPITAL ENCOUNTER (OUTPATIENT)
Dept: RADIOLOGY | Facility: MEDICAL CENTER | Age: 63
End: 2023-06-14
Attending: NURSE PRACTITIONER
Payer: MEDICAID

## 2023-06-14 DIAGNOSIS — M79.641 RIGHT HAND PAIN: ICD-10-CM

## 2023-06-14 PROCEDURE — 73130 X-RAY EXAM OF HAND: CPT | Mod: RT

## 2023-08-08 ENCOUNTER — OFFICE VISIT (OUTPATIENT)
Dept: INTERNAL MEDICINE | Facility: OTHER | Age: 63
End: 2023-08-08
Payer: MEDICAID

## 2023-08-08 VITALS
HEIGHT: 70 IN | TEMPERATURE: 98.5 F | DIASTOLIC BLOOD PRESSURE: 69 MMHG | HEART RATE: 54 BPM | WEIGHT: 149.2 LBS | BODY MASS INDEX: 21.36 KG/M2 | OXYGEN SATURATION: 97 % | SYSTOLIC BLOOD PRESSURE: 135 MMHG

## 2023-08-08 DIAGNOSIS — R53.83 OTHER FATIGUE: ICD-10-CM

## 2023-08-08 DIAGNOSIS — Z13.228 SCREENING FOR METABOLIC DISORDER: ICD-10-CM

## 2023-08-08 DIAGNOSIS — M05.742 RHEUMATOID ARTHRITIS INVOLVING BOTH HANDS WITH POSITIVE RHEUMATOID FACTOR (HCC): ICD-10-CM

## 2023-08-08 DIAGNOSIS — K21.9 GASTROESOPHAGEAL REFLUX DISEASE WITHOUT ESOPHAGITIS: ICD-10-CM

## 2023-08-08 DIAGNOSIS — E03.8 OTHER SPECIFIED HYPOTHYROIDISM: ICD-10-CM

## 2023-08-08 DIAGNOSIS — M05.741 RHEUMATOID ARTHRITIS INVOLVING BOTH HANDS WITH POSITIVE RHEUMATOID FACTOR (HCC): ICD-10-CM

## 2023-08-08 PROBLEM — E87.1 HYPONATREMIA: Status: RESOLVED | Noted: 2019-02-18 | Resolved: 2023-08-08

## 2023-08-08 PROBLEM — R79.89 AZOTEMIA: Status: RESOLVED | Noted: 2019-02-18 | Resolved: 2023-08-08

## 2023-08-08 PROCEDURE — 99204 OFFICE O/P NEW MOD 45 MIN: CPT | Mod: GC

## 2023-08-08 RX ORDER — LEVOTHYROXINE SODIUM 0.15 MG/1
TABLET ORAL
Qty: 90 TABLET | Refills: 3 | Status: CANCELLED | OUTPATIENT
Start: 2023-08-08

## 2023-08-08 RX ORDER — FAMOTIDINE 20 MG/1
20 TABLET, FILM COATED ORAL 2 TIMES DAILY
Qty: 60 TABLET | Refills: 1 | Status: SHIPPED | OUTPATIENT
Start: 2023-08-08 | End: 2023-10-23

## 2023-08-08 ASSESSMENT — PATIENT HEALTH QUESTIONNAIRE - PHQ9: CLINICAL INTERPRETATION OF PHQ2 SCORE: 0

## 2023-08-08 NOTE — PROGRESS NOTES
New Patient    Christophe Leary is a 63 y.o. male with past medical history of hypothyroidism, fibromyalgia, GERD, and rheumatoid arthritis who presents today with the following:    CC: abdominal pain    HPI:    Patient was an established patient at the clinic but has not followed up in a couple of years. He is here to re-establish care.    Abdominal pain  Patient states he has had on and off abdominal pain for 1 year that is worse after eating food. He describes it as a dull ache that is diffuse over his abdomen. He denies associated nausea or vomiting. He reports some acid reflux and has been taking over the counter antacids with some relief. He also reports weight loss of 15 pounds over 1-2 years and decreased appetite for the past month.     Fatigue  Patient reports fatigue for the past year that has been worsening recently over the past couple of months. 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 He also reports constipation for the last year that occurs every couple of days. When he does have bowel movements, they are hard stools. He has been taking over the counter stool softeners with minimal relief. Patient also endorses feelings of his heart beating hard at night when watching TV. This has been occurring on and off for the past year. When it does happen, he states it lasts for a while but eventually resolves on its own.     Past Medical History:   Diagnosis Date    Fibromyalgia     GERD (gastroesophageal reflux disease)     Hypertension     medicated    Hypothyroidism     Rheumatoid arthritis(714.0)        Past Surgical History:   Procedure Laterality Date    KNEE ARTHROPLASTY TOTAL  2013    right    OTHER ORTHOPEDIC SURGERY      knees, elbow, wrist       Family History   Problem Relation Age of Onset    Heart Disease Father        Social History     Socioeconomic History    Marital status: Single     Spouse name: Not on file    Number  of children: Not on file    Years of education: Not on file    Highest education level: Not on file   Occupational History    Occupation:    Tobacco Use    Smoking status: Never    Smokeless tobacco: Never   Vaping Use    Vaping Use: Never used   Substance and Sexual Activity    Alcohol use: Yes     Alcohol/week: 0.0 oz     Comment: rarely    Drug use: No     Comment: METH AMPHETAMINE 20 YRS AGO    Sexual activity: Yes     Partners: Female   Other Topics Concern    Not on file   Social History Narrative    Not on file     Social Determinants of Health     Financial Resource Strain: Not on file   Food Insecurity: Not on file   Transportation Needs: Not on file   Physical Activity: Not on file   Stress: Not on file   Social Connections: Not on file   Intimate Partner Violence: Not on file   Housing Stability: Not on file       Current Outpatient Medications   Medication Sig Dispense Refill    famotidine (PEPCID) 20 MG Tab Take 1 Tablet by mouth 2 times a day. 60 Tablet 1    levothyroxine (SYNTHROID) 150 MCG Tab TAKE 1 TABLET BY MOUTH ONCE DAILY IN THE MORNING BEFORE BREAKFAST 90 Tablet 3    oxyCODONE-acetaminophen (PERCOCET-10)  MG Tab Take 1 Tab by mouth 3 times a day.      morphine ER (MS CONTIN) 30 MG Tab CR tablet Take 30 mg by mouth every 12 hours.       No current facility-administered medications for this visit.       No Known Allergies      ROS:   As per HPI. Additional pertinent systems as noted below.  Constitutional: Negative for chills and fever.   Respiratory: Negative for cough, wheezing, shortness of breath.    Cardiovascular: Negative for chest pain, palpitations and leg swelling.   Gastrointestinal: Negative for abdominal pain, constipation, diarrhea, heartburn, nausea and vomiting.   Genitourinary: Negative for dysuria, flank pain and hematuria.   Musculoskeletal: Negative for falls and myalgias.   Skin: Negative for itching and rash.   Neurological: Negative for dizziness,  "seizures, loss of consciousness and headaches.     Physical Exam:  /69 (BP Location: Right arm, Patient Position: Sitting, BP Cuff Size: Adult)   Pulse (!) 54   Temp 36.9 °C (98.5 °F) (Temporal)   Ht 1.778 m (5' 10\")   Wt 67.7 kg (149 lb 3.2 oz)   SpO2 97%   BMI 21.41 kg/m²   General:  Alert and oriented, No apparent distress.  HEENT: Normocephalic, atraumatic. No scleral icterus. Mucous membranes clear and moist without erythema or exudates.   Neck: Supple. No lymphadenopathy noted. Thyroid not enlarged.  Lungs: Clear to auscultation. No wheezes, rales, or rhonchi.   Cardiovascular: Regular rate and rhythm. No murmurs, rubs or gallops.  Abdomen:  Regular bowel sounds, slightly tender to palpation in midline, no rebound or guarding noted.  Extremities: No clubbing, cyanosis, edema. Mild ulnar deviation of left hand, nodule on right ring finger  Skin: Clear. No rash or suspicious skin lesions noted.  Neuro: Aox4, strength 5/5 bilat upper and lower extremities.       Assessment and Plan:     #abdominal pain  #GERD  #weight loss  Patient with abdominal pain on and off for the past year worse after eating. He has history of acid reflux and was historically on ranitidine until it was taken off the market. He also has weight loss, which may be due to his abdominal pain and acid reflux. He had an EGD and colonoscopy done in 2018 that had normal findings. He is due for another colonoscopy in October 2023. Will prescribe famotidine 20 mg bid and order H pylori stool antigen test. Once patient completes H pylori test, will consider placing patient on Nexium as it seems that he was on it in the past with good relief of his symptoms.   -Start famotidine 20 mg bid  -H pylori stool antigen    #constipation  Patient with constipation that has been occurring every couple of days for the past year. He is currently on Morphine and Percocet which could be contributing to his constipation. In addition, patient has history " "of hypothyroidism and has not been taking his synthroid. As he has not had prolonged periods of constipation, will advise patient to start a high fiber diet and drink plenty of water. If his symptoms worsen, will consider starting Miralax  -Advise high fiber diet and plenty of water    #hypothyroidism  #fatigue  TSH (5/26/21): 4.620  Patient with history of hypothyroidism historically on synthroid 150 mg daily. He has not been following up with the clinic and so has not been taking his medications for a couple of years due to running out. He reports symptoms of fatigue and constipation. He has a heart rate of 56 today in the clinic. Will recheck TSH and then consider starting synthroid based on the results. Will also check vitamin B12 level for his fatigue.  -TSH with reflex T4  -consider synthroid dose after getting TSH results  -vitamin B12    #rheumatoid arthritis  Rheumatoid factor (5/26/21): 282  CRP (5/26/21): 3.13  Patient with history of rheumatoid arthritis previously following Dr. Gomez (rheumatologist). He was on treatment, but discontinued it during COVID because he did not want to catch COVID while being immunosuppressed. He has a new nodule on his right ring finger and mild ulnar deviation of the digits of his left hand. He has associated pain in both his hands. He is agreeable to start treatment again. Will refer him to rheumatology for continued management.  -Referral to rheumatology    #screening for metabolic disorders  Patient lost to follow up with clinic a couple of years ago. Has not had recent labs done within the past year. Will obtain CMP and lipid panel to screen for metabolic disorders  -CMP  -Lipid panel    #arrhythmia  Patient with \"hard\" heart beat feeling at night occasionally when watching TV that lasts for a while. No associated chest pain or shortness of breath. May be a component of his untreated GERD. Will re-assess patient at next visit for continued symptoms and consider heart " monitor to evaluate for any arrhythmias.     Follow up: 5 weeks    Signed by: Valerio Garcia D.O.

## 2023-08-15 ENCOUNTER — HOSPITAL ENCOUNTER (OUTPATIENT)
Dept: LAB | Facility: MEDICAL CENTER | Age: 63
End: 2023-08-15
Payer: MEDICAID

## 2023-08-15 DIAGNOSIS — E03.8 OTHER SPECIFIED HYPOTHYROIDISM: ICD-10-CM

## 2023-08-15 DIAGNOSIS — R53.83 OTHER FATIGUE: ICD-10-CM

## 2023-08-15 DIAGNOSIS — Z13.228 SCREENING FOR METABOLIC DISORDER: ICD-10-CM

## 2023-08-15 LAB
ALBUMIN SERPL BCP-MCNC: 4.1 G/DL (ref 3.2–4.9)
ALBUMIN/GLOB SERPL: 1.5 G/DL
ALP SERPL-CCNC: 61 U/L (ref 30–99)
ALT SERPL-CCNC: 7 U/L (ref 2–50)
ANION GAP SERPL CALC-SCNC: 13 MMOL/L (ref 7–16)
AST SERPL-CCNC: 14 U/L (ref 12–45)
BILIRUB SERPL-MCNC: 0.2 MG/DL (ref 0.1–1.5)
BUN SERPL-MCNC: 18 MG/DL (ref 8–22)
CALCIUM ALBUM COR SERPL-MCNC: 9 MG/DL (ref 8.5–10.5)
CALCIUM SERPL-MCNC: 9.1 MG/DL (ref 8.5–10.5)
CHLORIDE SERPL-SCNC: 105 MMOL/L (ref 96–112)
CHOLEST SERPL-MCNC: 158 MG/DL (ref 100–199)
CO2 SERPL-SCNC: 24 MMOL/L (ref 20–33)
CREAT SERPL-MCNC: 0.86 MG/DL (ref 0.5–1.4)
FASTING STATUS PATIENT QL REPORTED: NORMAL
GFR SERPLBLD CREATININE-BSD FMLA CKD-EPI: 98 ML/MIN/1.73 M 2
GLOBULIN SER CALC-MCNC: 2.8 G/DL (ref 1.9–3.5)
GLUCOSE SERPL-MCNC: 100 MG/DL (ref 65–99)
HDLC SERPL-MCNC: 39 MG/DL
LDLC SERPL CALC-MCNC: 100 MG/DL
POTASSIUM SERPL-SCNC: 4.2 MMOL/L (ref 3.6–5.5)
PROT SERPL-MCNC: 6.9 G/DL (ref 6–8.2)
SODIUM SERPL-SCNC: 142 MMOL/L (ref 135–145)
T4 FREE SERPL-MCNC: 0.65 NG/DL (ref 0.93–1.7)
TRIGL SERPL-MCNC: 94 MG/DL (ref 0–149)
TSH SERPL DL<=0.005 MIU/L-ACNC: 30.1 UIU/ML (ref 0.38–5.33)
VIT B12 SERPL-MCNC: 496 PG/ML (ref 211–911)

## 2023-08-15 PROCEDURE — 80061 LIPID PANEL: CPT

## 2023-08-15 PROCEDURE — 84443 ASSAY THYROID STIM HORMONE: CPT

## 2023-08-15 PROCEDURE — 82607 VITAMIN B-12: CPT

## 2023-08-15 PROCEDURE — 36415 COLL VENOUS BLD VENIPUNCTURE: CPT

## 2023-08-15 PROCEDURE — 84439 ASSAY OF FREE THYROXINE: CPT

## 2023-08-15 PROCEDURE — 80053 COMPREHEN METABOLIC PANEL: CPT

## 2023-08-16 DIAGNOSIS — M05.711 RHEUMATOID ARTHRITIS INVOLVING BOTH SHOULDERS WITH POSITIVE RHEUMATOID FACTOR (HCC): ICD-10-CM

## 2023-08-16 DIAGNOSIS — M05.712 RHEUMATOID ARTHRITIS INVOLVING BOTH SHOULDERS WITH POSITIVE RHEUMATOID FACTOR (HCC): ICD-10-CM

## 2023-09-21 ENCOUNTER — OFFICE VISIT (OUTPATIENT)
Dept: INTERNAL MEDICINE | Facility: OTHER | Age: 63
End: 2023-09-21
Payer: MEDICAID

## 2023-09-21 VITALS
WEIGHT: 145.6 LBS | TEMPERATURE: 97.2 F | BODY MASS INDEX: 20.84 KG/M2 | HEART RATE: 63 BPM | HEIGHT: 70 IN | SYSTOLIC BLOOD PRESSURE: 153 MMHG | DIASTOLIC BLOOD PRESSURE: 83 MMHG

## 2023-09-21 DIAGNOSIS — R63.4 WEIGHT LOSS: ICD-10-CM

## 2023-09-21 DIAGNOSIS — K21.9 GASTROESOPHAGEAL REFLUX DISEASE WITHOUT ESOPHAGITIS: ICD-10-CM

## 2023-09-21 DIAGNOSIS — M05.711 RHEUMATOID ARTHRITIS INVOLVING BOTH SHOULDERS WITH POSITIVE RHEUMATOID FACTOR (HCC): ICD-10-CM

## 2023-09-21 DIAGNOSIS — K59.00 CONSTIPATION, UNSPECIFIED CONSTIPATION TYPE: ICD-10-CM

## 2023-09-21 DIAGNOSIS — E03.9 ACQUIRED HYPOTHYROIDISM: ICD-10-CM

## 2023-09-21 DIAGNOSIS — M05.712 RHEUMATOID ARTHRITIS INVOLVING BOTH SHOULDERS WITH POSITIVE RHEUMATOID FACTOR (HCC): ICD-10-CM

## 2023-09-21 DIAGNOSIS — Z23 NEED FOR VACCINATION: ICD-10-CM

## 2023-09-21 DIAGNOSIS — M79.7 FIBROMYALGIA: ICD-10-CM

## 2023-09-21 DIAGNOSIS — H61.21 IMPACTED CERUMEN OF RIGHT EAR: ICD-10-CM

## 2023-09-21 DIAGNOSIS — Z12.12 SCREENING FOR COLORECTAL CANCER: ICD-10-CM

## 2023-09-21 DIAGNOSIS — K63.5 COLORECTAL POLYP DETECTED ON COLONOSCOPY: ICD-10-CM

## 2023-09-21 DIAGNOSIS — Z12.11 SCREENING FOR COLORECTAL CANCER: ICD-10-CM

## 2023-09-21 PROBLEM — J45.909 REACTIVE AIRWAY DISEASE: Status: RESOLVED | Noted: 2017-11-02 | Resolved: 2023-09-21

## 2023-09-21 PROCEDURE — 3079F DIAST BP 80-89 MM HG: CPT

## 2023-09-21 PROCEDURE — 3077F SYST BP >= 140 MM HG: CPT

## 2023-09-21 PROCEDURE — 90471 IMMUNIZATION ADMIN: CPT

## 2023-09-21 PROCEDURE — 99214 OFFICE O/P EST MOD 30 MIN: CPT | Mod: GC,25

## 2023-09-21 PROCEDURE — 90686 IIV4 VACC NO PRSV 0.5 ML IM: CPT

## 2023-09-21 RX ORDER — POLYETHYLENE GLYCOL 3350 17 G/17G
17 POWDER, FOR SOLUTION ORAL DAILY
Qty: 225 G | Refills: 3 | Status: SHIPPED | OUTPATIENT
Start: 2023-09-21

## 2023-09-21 RX ORDER — LEVOTHYROXINE SODIUM 0.15 MG/1
TABLET ORAL
Qty: 90 TABLET | Refills: 3 | Status: SHIPPED | OUTPATIENT
Start: 2023-09-21 | End: 2023-10-24

## 2023-09-21 RX ORDER — FAMOTIDINE 20 MG/1
20 TABLET, FILM COATED ORAL 2 TIMES DAILY
Qty: 60 TABLET | Refills: 1 | Status: CANCELLED | OUTPATIENT
Start: 2023-09-21

## 2023-09-21 RX ORDER — OMEPRAZOLE 20 MG/1
20 CAPSULE, DELAYED RELEASE ORAL DAILY
Qty: 90 CAPSULE | Refills: 1 | Status: SHIPPED | OUTPATIENT
Start: 2023-09-21

## 2023-09-21 ASSESSMENT — ENCOUNTER SYMPTOMS
SORE THROAT: 0
CHILLS: 0
PALPITATIONS: 0
SHORTNESS OF BREATH: 0
DIZZINESS: 0
VOMITING: 0
COUGH: 0
HEADACHES: 0
MYALGIAS: 1
ABDOMINAL PAIN: 0
NAUSEA: 0
FEVER: 0

## 2023-09-21 NOTE — PROGRESS NOTES
"    Chief Complaint: right ear pressure    Last Seen: 8/8/23    History of Present Illness:   Christophe Leary is a 63 y.o. male with PMH relevant for hypothyroidism, fibromyalgia, GERD, and rheumatoid arthritis who presents with right ear pressure. Patient states he has been having on and off right ear pressure for the past 6 months to 1 year. He denies trauma to the area. He reports hearing loss when he presses a certain area behind his right ear. He states he saw an ENT previously who removed \"stuff\" from his ear. He denies drainage or pain.     Patient also has had significant weight loss in the last 2 years. He states he was 171 pounds and is now 145 pounds. He reports decreased appetite and stomach pains with eating. He also reports acid reflux and constipation. He describes his stools as \"rocks.\" He denies any night sweats or adenopathy.    Patient had labs done at previous visit, which included CMP, lipid, TSH, and T4. CMP was normal. Lipid panel showed cholesterol 158, triglycerides 94, HDL 39 and . TSH was 30.1 and T4 0.65.       Review of Systems:  Review of Systems   Constitutional:  Negative for chills and fever.   HENT:  Negative for congestion and sore throat.    Respiratory:  Negative for cough and shortness of breath.    Cardiovascular:  Negative for chest pain and palpitations.   Gastrointestinal:  Negative for abdominal pain, nausea and vomiting.   Genitourinary:  Negative for dysuria and hematuria.   Musculoskeletal:  Positive for joint pain and myalgias.   Skin:  Negative for itching and rash.   Neurological:  Negative for dizziness and headaches.        Medications:     Current Outpatient Medications:     levothyroxine, TAKE 1 TABLET BY MOUTH ONCE DAILY IN THE MORNING BEFORE BREAKFAST    omeprazole, 20 mg, Oral, DAILY    polyethylene glycol 3350, 17 g, Oral, DAILY    famotidine, 20 mg, Oral, BID, Taking    oxyCODONE-acetaminophen, 1 Tablet, Oral, TID, Taking    morphine ER, 30 mg, " "Oral, Q12HRS, Taking     Objective:  Vitals:   BP (!) 153/83 (BP Location: Left arm, Patient Position: Sitting, BP Cuff Size: Adult)   Pulse 63   Temp 36.2 °C (97.2 °F) (Temporal)   Ht 1.778 m (5' 10\")   Wt 66 kg (145 lb 9.6 oz)  Body mass index is 20.89 kg/m².    Physical Exam  Constitutional:       General: He is not in acute distress.     Appearance: Normal appearance.   HENT:      Head: Normocephalic and atraumatic.      Right Ear: There is impacted cerumen.      Mouth/Throat:      Mouth: Mucous membranes are moist.      Pharynx: No oropharyngeal exudate.   Eyes:      Extraocular Movements: Extraocular movements intact.      Pupils: Pupils are equal, round, and reactive to light.   Cardiovascular:      Rate and Rhythm: Normal rate and regular rhythm.   Pulmonary:      Effort: No respiratory distress.      Breath sounds: Normal breath sounds. No wheezing.   Abdominal:      General: There is no distension.      Palpations: Abdomen is soft.      Tenderness: There is no abdominal tenderness.   Musculoskeletal:      Cervical back: Neck supple. No rigidity.   Skin:     General: Skin is warm and dry.   Neurological:      General: No focal deficit present.      Mental Status: He is oriented to person, place, and time.          Results:    Lab Results   Component Value Date/Time    CHOLSTRLTOT 158 08/15/2023 12:11 PM     (H) 08/15/2023 12:11 PM    HDL 39 (A) 08/15/2023 12:11 PM    TRIGLYCERIDE 94 08/15/2023 12:11 PM       Lab Results   Component Value Date/Time    SODIUM 142 08/15/2023 12:11 PM    POTASSIUM 4.2 08/15/2023 12:11 PM    CHLORIDE 105 08/15/2023 12:11 PM    CO2 24 08/15/2023 12:11 PM    GLUCOSE 100 (H) 08/15/2023 12:11 PM    BUN 18 08/15/2023 12:11 PM    CREATININE 0.86 08/15/2023 12:11 PM    CREATININE 1.0 10/30/2008 08:55 PM     Lab Results   Component Value Date/Time    ALKPHOSPHAT 61 08/15/2023 12:11 PM    ASTSGOT 14 08/15/2023 12:11 PM    ALTSGPT 7 08/15/2023 12:11 PM    TBILIRUBIN 0.2 " 08/15/2023 12:11 PM        Lab Results   Component Value Date/Time    WBC 6.7 05/26/2021 02:02 PM    RBC 4.71 05/26/2021 02:02 PM    HEMOGLOBIN 12.6 (L) 05/26/2021 02:02 PM    HEMATOCRIT 40.8 (L) 05/26/2021 02:02 PM    MCV 86.6 05/26/2021 02:02 PM    MCH 26.8 (L) 05/26/2021 02:02 PM    MCHC 30.9 (L) 05/26/2021 02:02 PM    MPV 8.9 (L) 05/26/2021 02:02 PM    NEUTSPOLYS 69.00 05/26/2021 02:02 PM    LYMPHOCYTES 21.20 (L) 05/26/2021 02:02 PM    MONOCYTES 7.10 05/26/2021 02:02 PM    EOSINOPHILS 1.80 05/26/2021 02:02 PM    BASOPHILS 0.60 05/26/2021 02:02 PM    HYPOCHROMIA 1+ 03/02/2010 05:00 PM    ANISOCYTOSIS 1+ 11/01/2017 10:31 PM        Assessment and Plan:    #hypothyroidism  TSH (8/15/23): 30.1  Free T4 (8/15/23): 0.65  Patient with symptoms of fatigue and constipation. Historically on synthroid 150 mcg but was lost to follow up and ran out of his medications  -synthroid 150 mcg daily    #cerumen impaction right ear  Ear pressure for past 6 months-1 year. Exam notable for impacted cerumen in right ear. Unable to visualize TM.   -ear irrigation    #weight loss  #abdominal pain  #GERD  #adenomatous colon polyp  26 pound weight loss in the past 2 years. Decreased appetite and abdominal pain with eating and acid reflux. No night sweats or adenopathy noted. Colonoscopy in 2018 showed adenomatous polyp with recommended repeat colonoscopy in 5 years. Patient declined further cancer screening with CT scan at this time and would like to focus on treating his other issues first. Will continue to monitor weight changes and new symptoms  -referral to GI for repeat colonoscopy  -omeprazole 20 mg daily    #constipation  Constipation occurring every couple of days for past year. Describes stools as hard rocks. Advised high fiber diet. Will prescribe Miralax and instructed patient to hold taking it if he develops loose stools.   -high fiber diet  -miralax    #rheumatoid arthritis  #fibromyalgia  Rheumatoid factor (5/26/21): 282  CRP  (5/26/21): 3.13  Hx of RA previously on immunotherapy. Has diffuse joint pain throughout. Rheumatology referral on hold until patient completes labwork for uric acid and CCP antibody. Patient currently on percocet and morphine and is following with pain management with next appointment on 9/22.   -uric acid and CCP  -follow up with rheumatology  -continue follow up with pain management    Need for vaccination  - INFLUENZA VACCINE QUAD INJ (PF)    Healthcare Maintenance  - Vaccinations: influenza  - Preventative Care: Counseled patient on benefits of healthy diet and consistent exercise    Follow Up:  Return in about 4 weeks (around 10/19/2023).      Valerio Garcia DO  Internal Medicine PGY-1  [facility] [employer]

## 2023-10-23 ENCOUNTER — OFFICE VISIT (OUTPATIENT)
Dept: INTERNAL MEDICINE | Facility: OTHER | Age: 63
End: 2023-10-23
Payer: MEDICAID

## 2023-10-23 ENCOUNTER — HOSPITAL ENCOUNTER (OUTPATIENT)
Dept: LAB | Facility: MEDICAL CENTER | Age: 63
End: 2023-10-23
Payer: MEDICAID

## 2023-10-23 VITALS
WEIGHT: 143 LBS | HEIGHT: 70 IN | HEART RATE: 80 BPM | BODY MASS INDEX: 20.47 KG/M2 | TEMPERATURE: 98.3 F | SYSTOLIC BLOOD PRESSURE: 138 MMHG | DIASTOLIC BLOOD PRESSURE: 80 MMHG | OXYGEN SATURATION: 95 %

## 2023-10-23 DIAGNOSIS — M05.712 RHEUMATOID ARTHRITIS INVOLVING BOTH SHOULDERS WITH POSITIVE RHEUMATOID FACTOR (HCC): ICD-10-CM

## 2023-10-23 DIAGNOSIS — M05.711 RHEUMATOID ARTHRITIS INVOLVING BOTH SHOULDERS WITH POSITIVE RHEUMATOID FACTOR (HCC): ICD-10-CM

## 2023-10-23 DIAGNOSIS — K21.9 GASTROESOPHAGEAL REFLUX DISEASE, UNSPECIFIED WHETHER ESOPHAGITIS PRESENT: ICD-10-CM

## 2023-10-23 DIAGNOSIS — Z12.6 SCREENING FOR BLADDER CANCER: ICD-10-CM

## 2023-10-23 DIAGNOSIS — E03.8 OTHER SPECIFIED HYPOTHYROIDISM: ICD-10-CM

## 2023-10-23 DIAGNOSIS — M79.7 FIBROMYALGIA: ICD-10-CM

## 2023-10-23 DIAGNOSIS — E03.9 ACQUIRED HYPOTHYROIDISM: ICD-10-CM

## 2023-10-23 DIAGNOSIS — K59.00 CONSTIPATION, UNSPECIFIED CONSTIPATION TYPE: ICD-10-CM

## 2023-10-23 DIAGNOSIS — Z13.9 SCREENING DUE: ICD-10-CM

## 2023-10-23 DIAGNOSIS — Z11.1 SCREENING FOR TUBERCULOSIS: ICD-10-CM

## 2023-10-23 LAB
APPEARANCE UR: CLEAR
BILIRUB UR QL STRIP.AUTO: NEGATIVE
COLOR UR: YELLOW
GLUCOSE UR STRIP.AUTO-MCNC: NEGATIVE MG/DL
KETONES UR STRIP.AUTO-MCNC: ABNORMAL MG/DL
LEUKOCYTE ESTERASE UR QL STRIP.AUTO: NEGATIVE
MICRO URNS: ABNORMAL
NITRITE UR QL STRIP.AUTO: NEGATIVE
PH UR STRIP.AUTO: 5.5 [PH] (ref 5–8)
PROT UR QL STRIP: NEGATIVE MG/DL
PSA SERPL-MCNC: 1.25 NG/ML (ref 0–4)
RBC UR QL AUTO: NEGATIVE
SP GR UR STRIP.AUTO: 1.03
T4 FREE SERPL-MCNC: 1.75 NG/DL (ref 0.93–1.7)
TSH SERPL DL<=0.005 MIU/L-ACNC: 0.04 UIU/ML (ref 0.38–5.33)
URATE SERPL-MCNC: 5.3 MG/DL (ref 2.5–8.3)
UROBILINOGEN UR STRIP.AUTO-MCNC: 0.2 MG/DL

## 2023-10-23 PROCEDURE — 84153 ASSAY OF PSA TOTAL: CPT

## 2023-10-23 PROCEDURE — 84439 ASSAY OF FREE THYROXINE: CPT

## 2023-10-23 PROCEDURE — 84443 ASSAY THYROID STIM HORMONE: CPT

## 2023-10-23 PROCEDURE — 81003 URINALYSIS AUTO W/O SCOPE: CPT

## 2023-10-23 PROCEDURE — 99214 OFFICE O/P EST MOD 30 MIN: CPT | Mod: GC

## 2023-10-23 PROCEDURE — 36415 COLL VENOUS BLD VENIPUNCTURE: CPT

## 2023-10-23 PROCEDURE — 3075F SYST BP GE 130 - 139MM HG: CPT

## 2023-10-23 PROCEDURE — 84550 ASSAY OF BLOOD/URIC ACID: CPT

## 2023-10-23 PROCEDURE — 86200 CCP ANTIBODY: CPT

## 2023-10-23 PROCEDURE — 3079F DIAST BP 80-89 MM HG: CPT

## 2023-10-23 PROCEDURE — 86480 TB TEST CELL IMMUN MEASURE: CPT

## 2023-10-23 ASSESSMENT — ENCOUNTER SYMPTOMS
NAUSEA: 1
SHORTNESS OF BREATH: 0
VOMITING: 0
HEADACHES: 0
FEVER: 0
ABDOMINAL PAIN: 1
PALPITATIONS: 0
DIZZINESS: 0
WEIGHT LOSS: 1
BACK PAIN: 1
CONSTIPATION: 1
COUGH: 0
CHILLS: 0
MYALGIAS: 1
SORE THROAT: 0

## 2023-10-23 NOTE — PROGRESS NOTES
Chief Complaint: Follow-up visit    Last Seen: 9/21/2023    History of Present Illness:   Christophe Leary is a 63 y.o. male with PMH relevant for hypothyroidism, fibromyalgia, GERD, rheumatoid arthritis who presents for follow-up.  Patient states that he has continued constipation.  He has bowel movements every couple of days and when he does he reports they are hard stools. He was prescribed MiraLAX and advised a high-fiber diet on his last visit but he has not started taking his MiraLAX. Of note, patient is on Percocet and morphine for chronic pain.    Patient has had 26 pound unintentional weight loss in 2 years.  He has had 2 pound weight loss since last month.  Colonoscopy in 2018 showed adenomatous polyp with recommendation of repeat colonoscopy in 5 years. He was referred to GI for repeat colonoscopy but he states that he has been unable to find time to schedule an appointment due to work. He also reports continued abdominal pain worse after eating with associated nausea. His nausea lasts for about 1 hour without any vomiting.  He has decreased appetite as well and states that he usually only eats dinner. Patient also has acid reflux after eating that is relieved with omeprazole.    Review of Systems:  Review of Systems   Constitutional:  Positive for weight loss. Negative for chills and fever.   HENT:  Negative for congestion and sore throat.    Respiratory:  Negative for cough and shortness of breath.    Cardiovascular:  Negative for chest pain and palpitations.   Gastrointestinal:  Positive for abdominal pain, constipation and nausea. Negative for vomiting.   Genitourinary:  Negative for dysuria and hematuria.   Musculoskeletal:  Positive for back pain, joint pain and myalgias.   Skin:  Negative for itching and rash.   Neurological:  Negative for dizziness and headaches.        Medications:     Current Outpatient Medications:     levothyroxine, TAKE 1 TABLET BY MOUTH ONCE DAILY IN THE MORNING  "BEFORE BREAKFAST, Taking    omeprazole, 20 mg, Oral, DAILY, Taking    polyethylene glycol 3350, 17 g, Oral, DAILY, Taking    oxyCODONE-acetaminophen, 1 Tablet, Oral, TID, Taking    morphine ER, 30 mg, Oral, Q12HRS, Taking     Objective:  Vitals:   /80 (BP Location: Left arm, Patient Position: Sitting, BP Cuff Size: Large adult)   Pulse 80   Temp 36.8 °C (98.3 °F) (Temporal)   Ht 1.778 m (5' 10\")   Wt 64.9 kg (143 lb)   SpO2 95%  Body mass index is 20.52 kg/m².    Physical Exam  Constitutional:       General: He is not in acute distress.     Appearance: Normal appearance.   HENT:      Head: Normocephalic and atraumatic.      Mouth/Throat:      Mouth: Mucous membranes are moist.      Pharynx: No oropharyngeal exudate.   Eyes:      Extraocular Movements: Extraocular movements intact.      Pupils: Pupils are equal, round, and reactive to light.   Cardiovascular:      Rate and Rhythm: Normal rate and regular rhythm.   Pulmonary:      Effort: No respiratory distress.      Breath sounds: Normal breath sounds. No wheezing.   Abdominal:      General: There is no distension.      Palpations: Abdomen is soft.      Tenderness: There is no abdominal tenderness.   Musculoskeletal:      Cervical back: Neck supple. No rigidity.   Skin:     General: Skin is warm and dry.   Neurological:      General: No focal deficit present.      Mental Status: He is oriented to person, place, and time.          Results:  Lab Results   Component Value Date/Time    CHOLSTRLTOT 158 08/15/2023 12:11 PM     (H) 08/15/2023 12:11 PM    HDL 39 (A) 08/15/2023 12:11 PM    TRIGLYCERIDE 94 08/15/2023 12:11 PM       Lab Results   Component Value Date/Time    SODIUM 142 08/15/2023 12:11 PM    POTASSIUM 4.2 08/15/2023 12:11 PM    CHLORIDE 105 08/15/2023 12:11 PM    CO2 24 08/15/2023 12:11 PM    GLUCOSE 100 (H) 08/15/2023 12:11 PM    BUN 18 08/15/2023 12:11 PM    CREATININE 0.86 08/15/2023 12:11 PM    CREATININE 1.0 10/30/2008 08:55 PM     Lab " Results   Component Value Date/Time    ALKPHOSPHAT 61 08/15/2023 12:11 PM    ASTSGOT 14 08/15/2023 12:11 PM    ALTSGPT 7 08/15/2023 12:11 PM    TBILIRUBIN 0.2 08/15/2023 12:11 PM        Lab Results   Component Value Date/Time    WBC 6.7 05/26/2021 02:02 PM    RBC 4.71 05/26/2021 02:02 PM    HEMOGLOBIN 12.6 (L) 05/26/2021 02:02 PM    HEMATOCRIT 40.8 (L) 05/26/2021 02:02 PM    MCV 86.6 05/26/2021 02:02 PM    MCH 26.8 (L) 05/26/2021 02:02 PM    MCHC 30.9 (L) 05/26/2021 02:02 PM    MPV 8.9 (L) 05/26/2021 02:02 PM    NEUTSPOLYS 69.00 05/26/2021 02:02 PM    LYMPHOCYTES 21.20 (L) 05/26/2021 02:02 PM    MONOCYTES 7.10 05/26/2021 02:02 PM    EOSINOPHILS 1.80 05/26/2021 02:02 PM    BASOPHILS 0.60 05/26/2021 02:02 PM    HYPOCHROMIA 1+ 03/02/2010 05:00 PM    ANISOCYTOSIS 1+ 11/01/2017 10:31 PM        Assessment and Plan:      #weight loss  #abdominal pain  #adenomatous colon polyp  26 pound unintentional weight loss in the past 2 years.  Had 2 pound weight loss in past month.  Colonoscopy in 2018 showed adenomatous polyp. Patient was referred to GI for repeat colonoscopy on his last visit in 9/21/2023.  However patient is unable to schedule appointment due to work conflicts.  Stressed the importance of following up with GI to evaluate for malignancies. Will also obtain PSA, QuantiFERON, and urinalysis to screen for other causes of his unintentional weight loss.  -Follow-up with GI for repeat colonoscopy  -PSA, QuantiFERON, UA    #constipation  Patient prescribed MiraLAX at last visit but has not taken it yet.  Advised him to start high-fiber diet and MiraLAX and to hold MiraLAX if he develops loose stools.    #hypothyroidism  TSH (8/15/23): 30.1  Free T4 (8/15/23): 0.65  Currently on Synthroid 150 mcg daily.  Patient reports continued fatigue and constipation.  Will obtain repeat TSH with T4 to evaluate levels and further titrate medication  -TSH with reflex to T4    #rheumatoid arthritis  #fibromyalgia  Patient needs uric acid  and CCP antibody done for rheumatology referral. He has not gotten it done yet. States he will get it done today. Following with pain management and is on Percocet and morphine.  -Follow-up with rheumatology  -Follow up with pain management    Healthcare Maintenance  - Preventative Care: Counseled patient on benefits of healthy diet and consistent exercise    Follow Up:  Return in about 4 weeks (around 11/20/2023).      Valerio Garcia DO  Internal Medicine PGY-1  [facility]

## 2023-10-24 ENCOUNTER — TELEPHONE (OUTPATIENT)
Dept: INTERNAL MEDICINE | Facility: OTHER | Age: 63
End: 2023-10-24
Payer: MEDICAID

## 2023-10-24 DIAGNOSIS — E03.9 HYPOTHYROIDISM, UNSPECIFIED TYPE: ICD-10-CM

## 2023-10-24 DIAGNOSIS — M05.712 RHEUMATOID ARTHRITIS INVOLVING BOTH SHOULDERS WITH POSITIVE RHEUMATOID FACTOR (HCC): ICD-10-CM

## 2023-10-24 DIAGNOSIS — M05.711 RHEUMATOID ARTHRITIS INVOLVING BOTH SHOULDERS WITH POSITIVE RHEUMATOID FACTOR (HCC): ICD-10-CM

## 2023-10-24 LAB
GAMMA INTERFERON BACKGROUND BLD IA-ACNC: 0.03 IU/ML
M TB IFN-G BLD-IMP: NEGATIVE
M TB IFN-G CD4+ BCKGRND COR BLD-ACNC: 0 IU/ML
MITOGEN IGNF BCKGRD COR BLD-ACNC: 1.23 IU/ML
QFT TB2 - NIL TBQ2: 0.02 IU/ML

## 2023-10-24 RX ORDER — LEVOTHYROXINE SODIUM 112 UG/1
112 TABLET ORAL
Qty: 30 TABLET | Refills: 1 | Status: SHIPPED | OUTPATIENT
Start: 2023-10-24

## 2023-10-24 NOTE — TELEPHONE ENCOUNTER
Called patient on 10/24/23 @1602 and discussed results of his labwork. Spoke to him about urinalysis that showed trace ketones. Encouraged patient to eat more regularly and stay hydrated. TSH was 0.040 and T4 was 1.75. Explained to patient that we will decrease his dose of synthroid to 112 mcg from 150 mcg. TB was negative. PSA was normal at 1.25. Uric acid normal at 5.3. Waiting on results of CCP antibody and once that is in, he will be able to be referred again to rheumatology.

## 2023-10-25 LAB — CCP IGA+IGG SERPL IA-ACNC: 239 UNITS (ref 0–19)

## 2023-10-26 ENCOUNTER — TELEPHONE (OUTPATIENT)
Dept: INTERNAL MEDICINE | Facility: OTHER | Age: 63
End: 2023-10-26
Payer: MEDICAID

## 2023-10-26 DIAGNOSIS — M05.711 RHEUMATOID ARTHRITIS INVOLVING BOTH SHOULDERS WITH POSITIVE RHEUMATOID FACTOR (HCC): ICD-10-CM

## 2023-10-26 DIAGNOSIS — M05.712 RHEUMATOID ARTHRITIS INVOLVING BOTH SHOULDERS WITH POSITIVE RHEUMATOID FACTOR (HCC): ICD-10-CM

## 2023-10-26 NOTE — TELEPHONE ENCOUNTER
Spoke with patient about CCP antibody results. They were elevated at 239, which is expected in rheumatoid arthritis. Informed him that upon completion of his uric acid and CCP labwork that the rheumatology referral has been resent and he should be able to schedule an appointment with them now.

## 2024-04-08 ENCOUNTER — OFFICE VISIT (OUTPATIENT)
Dept: INTERNAL MEDICINE | Facility: OTHER | Age: 64
End: 2024-04-08
Payer: MEDICAID

## 2024-04-08 VITALS
HEART RATE: 80 BPM | WEIGHT: 155.2 LBS | DIASTOLIC BLOOD PRESSURE: 87 MMHG | SYSTOLIC BLOOD PRESSURE: 134 MMHG | HEIGHT: 70 IN | BODY MASS INDEX: 22.22 KG/M2 | TEMPERATURE: 97.7 F | OXYGEN SATURATION: 97 %

## 2024-04-08 DIAGNOSIS — M05.712 RHEUMATOID ARTHRITIS INVOLVING BOTH SHOULDERS WITH POSITIVE RHEUMATOID FACTOR (HCC): ICD-10-CM

## 2024-04-08 DIAGNOSIS — R63.4 WEIGHT LOSS: ICD-10-CM

## 2024-04-08 DIAGNOSIS — E03.9 ACQUIRED HYPOTHYROIDISM: ICD-10-CM

## 2024-04-08 DIAGNOSIS — M25.512 ACUTE PAIN OF LEFT SHOULDER: ICD-10-CM

## 2024-04-08 DIAGNOSIS — M05.711 RHEUMATOID ARTHRITIS INVOLVING BOTH SHOULDERS WITH POSITIVE RHEUMATOID FACTOR (HCC): ICD-10-CM

## 2024-04-08 DIAGNOSIS — Z79.891 CHRONIC USE OF OPIATE FOR THERAPEUTIC PURPOSE: ICD-10-CM

## 2024-04-08 DIAGNOSIS — G89.29 CHRONIC PAIN OF BOTH SHOULDERS: ICD-10-CM

## 2024-04-08 DIAGNOSIS — M25.511 CHRONIC PAIN OF BOTH SHOULDERS: ICD-10-CM

## 2024-04-08 DIAGNOSIS — E03.9 HYPOTHYROIDISM, UNSPECIFIED TYPE: ICD-10-CM

## 2024-04-08 DIAGNOSIS — M25.512 CHRONIC PAIN OF BOTH SHOULDERS: ICD-10-CM

## 2024-04-08 PROBLEM — N40.0 BENIGN PROSTATIC HYPERPLASIA: Status: ACTIVE | Noted: 2024-04-08

## 2024-04-08 PROCEDURE — 99214 OFFICE O/P EST MOD 30 MIN: CPT | Mod: GC

## 2024-04-08 PROCEDURE — 3075F SYST BP GE 130 - 139MM HG: CPT | Mod: GC

## 2024-04-08 PROCEDURE — 3079F DIAST BP 80-89 MM HG: CPT | Mod: GC

## 2024-04-08 RX ORDER — MORPHINE SULFATE 30 MG/1
1 TABLET, FILM COATED, EXTENDED RELEASE ORAL 2 TIMES DAILY
COMMUNITY
End: 2024-04-08

## 2024-04-08 RX ORDER — LEVOTHYROXINE SODIUM 112 UG/1
112 TABLET ORAL
Qty: 30 TABLET | Refills: 1 | Status: SHIPPED | OUTPATIENT
Start: 2024-04-08

## 2024-04-08 RX ORDER — HYDROCODONE BITARTRATE AND ACETAMINOPHEN 5; 325 MG/1; MG/1
1 TABLET ORAL
COMMUNITY

## 2024-04-08 RX ORDER — CYCLOBENZAPRINE HCL 5 MG
5 TABLET ORAL 3 TIMES DAILY PRN
Qty: 30 TABLET | Refills: 0 | Status: SHIPPED | OUTPATIENT
Start: 2024-04-08

## 2024-04-08 ASSESSMENT — ENCOUNTER SYMPTOMS
PALPITATIONS: 0
DIZZINESS: 0
FEVER: 0
COUGH: 0
SORE THROAT: 0
VOMITING: 0
ABDOMINAL PAIN: 0
MYALGIAS: 0
HEADACHES: 0
BACK PAIN: 0
SHORTNESS OF BREATH: 0
NAUSEA: 0
CHILLS: 0

## 2024-04-08 ASSESSMENT — PATIENT HEALTH QUESTIONNAIRE - PHQ9: CLINICAL INTERPRETATION OF PHQ2 SCORE: 0

## 2024-04-08 NOTE — PROGRESS NOTES
"    Chief Complaint: Left shoulder pain    Last Seen: 10/23/2023    History of Present Illness:   Christophe Leary is a 63 y.o. male with PMH relevant for hypothyroidism, fibromyalgia, GERD, rheumatoid arthritis who presents with left shoulder pain.  Patient has chronic pain in both of the shoulders, however he has new onset pain in his left shoulder that began a couple months ago.  He denies any trauma to the area.  He states that he noticed pain in his left shoulder getting spontaneously worse.  He states that it is worse in the morning when he wakes up and is unable to lift his arm.  Throughout the day it gets better and he is able to have more range of motion.  He describes the pain as a pressure that radiates up to his neck.  He denies any numbness or tingling in the area.      Review of Systems:  Review of Systems   Constitutional:  Negative for chills and fever.   HENT:  Negative for congestion and sore throat.    Respiratory:  Negative for cough and shortness of breath.    Cardiovascular:  Negative for chest pain and palpitations.   Gastrointestinal:  Negative for abdominal pain, nausea and vomiting.   Genitourinary:  Negative for dysuria and hematuria.   Musculoskeletal:  Negative for back pain and myalgias.   Skin:  Negative for itching and rash.   Neurological:  Negative for dizziness and headaches.        Medications:     Current Outpatient Medications:     levothyroxine, 112 mcg, Oral, AM ES, Taking    omeprazole, 20 mg, Oral, DAILY, Taking    polyethylene glycol 3350, 17 g, Oral, DAILY, PRN    oxyCODONE-acetaminophen, 1 Tablet, Oral, TID, Taking    morphine ER, 30 mg, Oral, Q12HRS, Taking    HYDROcodone-acetaminophen, 1 Tablet, Oral, 6X/DAY     Objective:  Vitals:   /87 (BP Location: Left arm, Patient Position: Sitting, BP Cuff Size: Small adult)   Pulse 80   Temp 36.5 °C (97.7 °F) (Temporal)   Ht 1.778 m (5' 10\")   Wt 70.4 kg (155 lb 3.2 oz)   SpO2 97%  Body mass index is 22.27 " kg/m².    Physical Exam  Constitutional:       General: He is not in acute distress.     Appearance: Normal appearance.   HENT:      Head: Normocephalic and atraumatic.   Eyes:      Extraocular Movements: Extraocular movements intact.      Pupils: Pupils are equal, round, and reactive to light.   Cardiovascular:      Rate and Rhythm: Normal rate and regular rhythm.   Pulmonary:      Effort: No respiratory distress.      Breath sounds: Normal breath sounds.   Abdominal:      General: There is no distension.      Palpations: Abdomen is soft.   Neurological:      General: No focal deficit present.      Mental Status: He is oriented to person, place, and time.          Results:  Lab Results   Component Value Date/Time    CHOLSTRLTOT 158 08/15/2023 12:11 PM     (H) 08/15/2023 12:11 PM    HDL 39 (A) 08/15/2023 12:11 PM    TRIGLYCERIDE 94 08/15/2023 12:11 PM       Lab Results   Component Value Date/Time    SODIUM 142 08/15/2023 12:11 PM    POTASSIUM 4.2 08/15/2023 12:11 PM    CHLORIDE 105 08/15/2023 12:11 PM    CO2 24 08/15/2023 12:11 PM    GLUCOSE 100 (H) 08/15/2023 12:11 PM    BUN 18 08/15/2023 12:11 PM    CREATININE 0.86 08/15/2023 12:11 PM    CREATININE 1.0 10/30/2008 08:55 PM     Lab Results   Component Value Date/Time    ALKPHOSPHAT 61 08/15/2023 12:11 PM    ASTSGOT 14 08/15/2023 12:11 PM    ALTSGPT 7 08/15/2023 12:11 PM    TBILIRUBIN 0.2 08/15/2023 12:11 PM        Lab Results   Component Value Date/Time    WBC 6.7 05/26/2021 02:02 PM    RBC 4.71 05/26/2021 02:02 PM    HEMOGLOBIN 12.6 (L) 05/26/2021 02:02 PM    HEMATOCRIT 40.8 (L) 05/26/2021 02:02 PM    MCV 86.6 05/26/2021 02:02 PM    MCH 26.8 (L) 05/26/2021 02:02 PM    MCHC 30.9 (L) 05/26/2021 02:02 PM    MPV 8.9 (L) 05/26/2021 02:02 PM    NEUTSPOLYS 69.00 05/26/2021 02:02 PM    LYMPHOCYTES 21.20 (L) 05/26/2021 02:02 PM    MONOCYTES 7.10 05/26/2021 02:02 PM    EOSINOPHILS 1.80 05/26/2021 02:02 PM    BASOPHILS 0.60 05/26/2021 02:02 PM    HYPOCHROMIA 1+  03/02/2010 05:00 PM    ANISOCYTOSIS 1+ 11/01/2017 10:31 PM        Assessment and Plan:    #Acute on chronic left shoulder pain  Patient with recently worsening left shoulder pain that he describes as a pressure.  No associated numbness or tingling.  Reports stiffness in the morning that improves throughout the day.  Possibly due to worsening of rheumatoid arthritis versus adhesive capsulitis.  Has limited range of motion on exam due to pain, unable to abduct past 90 degrees.  No deformities noted.  - Left shoulder x-ray  - Patient is following with pain management already for his chronic pain and is on narcotics.  Advised to continue taking his prescribed pain medications with strict alarm precautions given  - Recommended over-the-counter pain medications such as Tylenol, topical NSAIDs, lidocaine patches  - Flexeril 5 mg 3 times daily as needed  - Referral to physical therapy    #rheumatoid arthritis  Rheumatoid factor (5/26/21): 282  CRP (5/26/21): 3.13  CCP (10/23/2023): 239  Patient with history of rheumatoid arthritis previously followed by Dr. Gomez (rheumatologist). He was on immunotherapy, but discontinued during COVID because he did not want to catch COVID or being immunosuppressed.  He has since been lost to follow-up.  Attempted to get him reestablished with rheumatology in November 2023 but at that time patient told referral center that he no longer needed rheumatology.  He is requesting to have a new referral for Dr. Gomez at this appointment  -Referral to rheumatology, Dr. Gomez  - Continue follow-up with pain management    #Hypothyroidism  (10/23/2023) TSH: 0.04  Free T4: 1.75  Patient states that he has not been taking his Synthroid for the past couple months  - Refilled Synthroid 112 mcg daily    #Weight loss  On previous visit patient had 26 pound unintentional weight loss in the past 2 years.  Had a colonoscopy in 2018 that showed edematous polyp.  Referral to GI sent and approved.  However at  this visit today, patient states that he would like to hold off on seeing them until he gets his rheumatoid arthritis resolved.  - Discussed risks of not following up with GI as he could potentially have a malignant etiology for his symptoms and patient expressed understanding    Follow Up:  Return in about 5 weeks (around 5/13/2024).

## 2024-04-20 ENCOUNTER — HOSPITAL ENCOUNTER (OUTPATIENT)
Dept: RADIOLOGY | Facility: MEDICAL CENTER | Age: 64
End: 2024-04-20
Payer: MEDICAID

## 2024-04-20 DIAGNOSIS — M25.512 ACUTE PAIN OF LEFT SHOULDER: ICD-10-CM

## 2024-04-20 PROCEDURE — 73030 X-RAY EXAM OF SHOULDER: CPT | Mod: LT

## 2024-04-23 DIAGNOSIS — K21.9 GASTROESOPHAGEAL REFLUX DISEASE WITHOUT ESOPHAGITIS: ICD-10-CM

## 2024-04-23 RX ORDER — OMEPRAZOLE 20 MG/1
20 CAPSULE, DELAYED RELEASE ORAL DAILY
Qty: 90 CAPSULE | Refills: 1 | Status: SHIPPED | OUTPATIENT
Start: 2024-04-23

## 2024-04-23 NOTE — TELEPHONE ENCOUNTER
Received request via: Pharmacy    Was the patient seen in the last year in this department? Yes    Does the patient have an active prescription (recently filled or refills available) for medication(s) requested?  yes    Ph[facility] Name[facility]    Does the patient have [facility] Plus and need 100 day supply (blood pressure, diabetes and cholesterol meds only)? Patient does not have SCP

## 2024-05-07 ENCOUNTER — APPOINTMENT (OUTPATIENT)
Dept: RADIOLOGY | Facility: IMAGING CENTER | Age: 64
End: 2024-05-07
Attending: PHYSICIAN ASSISTANT
Payer: MEDICAID

## 2024-05-07 ENCOUNTER — OFFICE VISIT (OUTPATIENT)
Dept: URGENT CARE | Facility: PHYSICIAN GROUP | Age: 64
End: 2024-05-07
Payer: MEDICAID

## 2024-05-07 VITALS
DIASTOLIC BLOOD PRESSURE: 82 MMHG | HEIGHT: 70 IN | TEMPERATURE: 97.9 F | RESPIRATION RATE: 16 BRPM | SYSTOLIC BLOOD PRESSURE: 146 MMHG | OXYGEN SATURATION: 95 % | WEIGHT: 153 LBS | HEART RATE: 87 BPM | BODY MASS INDEX: 21.9 KG/M2

## 2024-05-07 DIAGNOSIS — M25.512 CHRONIC PAIN OF BOTH SHOULDERS: ICD-10-CM

## 2024-05-07 DIAGNOSIS — M25.512 ACUTE PAIN OF LEFT SHOULDER: ICD-10-CM

## 2024-05-07 DIAGNOSIS — M25.511 CHRONIC PAIN OF BOTH SHOULDERS: ICD-10-CM

## 2024-05-07 DIAGNOSIS — G89.29 CHRONIC PAIN OF BOTH SHOULDERS: ICD-10-CM

## 2024-05-07 DIAGNOSIS — M54.2 NECK PAIN: ICD-10-CM

## 2024-05-07 PROCEDURE — 3077F SYST BP >= 140 MM HG: CPT | Performed by: PHYSICIAN ASSISTANT

## 2024-05-07 PROCEDURE — 99213 OFFICE O/P EST LOW 20 MIN: CPT | Performed by: PHYSICIAN ASSISTANT

## 2024-05-07 PROCEDURE — 72040 X-RAY EXAM NECK SPINE 2-3 VW: CPT | Mod: TC,FY | Performed by: PHYSICIAN ASSISTANT

## 2024-05-07 PROCEDURE — 3079F DIAST BP 80-89 MM HG: CPT | Performed by: PHYSICIAN ASSISTANT

## 2024-05-08 NOTE — PROGRESS NOTES
Subjective     Christophe Leary is a 64 y.o. male who presents with Shoulder Pain ((L) radiating into Neck x 2 weeks.  No known injury. )      HPI:  Christophe Leary is a 64 y.o. male who presents today for evaluation of neck and shoulder pain. Patient has hx significant for fibromyalgia and RA. He has chronic pain in both of his shoulders among other areas and sees pain management. A few months ago he started to notice some new/worsening pain in his left shoulder. He saw his PCP for this last month. They obtained an x-ray, which was unremarkable, and placed a referral to PT and to rheumatology. Patient is not interested in PT. He was also prescribed cyclobenzaprine at that time which he says is not helping. Says he continues to have the shoulder pain but over the past few weeks the pain is starting to move to his neck as well. No trauma. No fever or focal weakness.        Review of Systems   Constitutional:  Negative for fever.   Musculoskeletal:  Positive for joint pain and neck pain.   Neurological:  Negative for focal weakness.         PMH:  has a past medical history of Fibromyalgia, GERD (gastroesophageal reflux disease), Hypertension, Hypothyroidism, and Rheumatoid arthritis(714.0).  MEDS:   Current Outpatient Medications:     omeprazole (PRILOSEC) 20 MG delayed-release capsule, Take 1 Capsule by mouth every day., Disp: 90 Capsule, Rfl: 1    HYDROcodone-acetaminophen (NORCO) 5-325 MG Tab per tablet, Take 1 Tablet by mouth 6 Times a Day., Disp: , Rfl:     cyclobenzaprine (FLEXERIL) 5 mg tablet, Take 1 Tablet by mouth 3 times a day as needed for Muscle Spasms., Disp: 30 Tablet, Rfl: 0    levothyroxine (SYNTHROID) 112 MCG Tab, Take 1 Tablet by mouth every morning on an empty stomach., Disp: 30 Tablet, Rfl: 1    polyethylene glycol 3350 (MIRALAX) 17 GM/SCOOP Powder, Take 17 g by mouth every day., Disp: 225 g, Rfl: 3    morphine ER (MS CONTIN) 30 MG Tab CR tablet, Take 30 mg by mouth every 12 hours.,  "Disp: , Rfl:     oxyCODONE-acetaminophen (PERCOCET-10)  MG Tab, Take 1 Tab by mouth 3 times a day. (Patient not taking: Reported on 5/7/2024), Disp: , Rfl:   ALLERGIES: No Known Allergies  SURGHX:   Past Surgical History:   Procedure Laterality Date    KNEE ARTHROPLASTY TOTAL  2013    right    OTHER ORTHOPEDIC SURGERY      knees, elbow, wrist     SOCHX:  reports that he has never smoked. He has never used smokeless tobacco. He reports current alcohol use. He reports that he does not use drugs.  FH: Family history was reviewed, no pertinent findings to report        Objective     BP (!) 146/82   Pulse 87   Temp 36.6 °C (97.9 °F) (Temporal)   Resp 16   Ht 1.778 m (5' 10\")   Wt 69.4 kg (153 lb)   SpO2 95%   BMI 21.95 kg/m²      Physical Exam  Musculoskeletal:      Left shoulder: Tenderness present. No swelling. Normal range of motion (full ROM but ROM causes pain).      Cervical back: Spasms and tenderness (tenderness and spasm mostly on the left side) present. No erythema. Pain with movement present. Decreased range of motion (mild decreased ROM with L/R lateral rotation. Pain with all movements).              DX-CERVICAL SPINE-2 OR 3 VIEWS  FINDINGS:  The cervical vertebral bodies are normal in appearance and alignment is normal. C1-2 alignment is normal on the open-mouth odontoid view.  There are scattered age-related degenerative changes of the spine with intervertebral disc space narrowing, endplate spurring and facet arthropathy.  No soft tissue abnormality is identified.     IMPRESSION:     Normal cervical spine.    Assessment & Plan     1. Neck pain  - DX-CERVICAL SPINE-2 OR 3 VIEWS; Future  - Referral to Orthopedics    2. Chronic pain of both shoulders  - Referral to Orthopedics    3. Acute pain of left shoulder  - Referral to Orthopedics    No acute osseous abnormality noted on x-ray. Referral to orthopedics placed. Continue current prescriptions and regimens for pain management. Still also " recommend that he schedule PT even though he doesn't think PT is helpful.        Differential Diagnosis, natural history, and supportive care discussed. Return to the Urgent Care or follow up with your PCP if symptoms fail to resolve, or for any new or worsening symptoms. Emergency room precautions discussed. Patient and/or family appears understanding of information.

## 2024-05-10 ASSESSMENT — ENCOUNTER SYMPTOMS
NECK PAIN: 1
FEVER: 0
FOCAL WEAKNESS: 0

## 2024-11-03 ENCOUNTER — APPOINTMENT (OUTPATIENT)
Dept: RADIOLOGY | Facility: MEDICAL CENTER | Age: 64
End: 2024-11-03
Attending: EMERGENCY MEDICINE
Payer: MEDICAID

## 2024-11-03 ENCOUNTER — HOSPITAL ENCOUNTER (EMERGENCY)
Facility: MEDICAL CENTER | Age: 64
End: 2024-11-03
Attending: EMERGENCY MEDICINE | Admitting: HOSPITALIST
Payer: MEDICAID

## 2024-11-03 VITALS
OXYGEN SATURATION: 96 % | HEIGHT: 70 IN | HEART RATE: 71 BPM | SYSTOLIC BLOOD PRESSURE: 138 MMHG | BODY MASS INDEX: 21.27 KG/M2 | RESPIRATION RATE: 20 BRPM | TEMPERATURE: 97.4 F | DIASTOLIC BLOOD PRESSURE: 91 MMHG | WEIGHT: 148.59 LBS

## 2024-11-03 DIAGNOSIS — K52.9 COLITIS: ICD-10-CM

## 2024-11-03 LAB
ABO + RH BLD: NORMAL
ABO GROUP BLD: NORMAL
ALBUMIN SERPL BCP-MCNC: 4.1 G/DL (ref 3.2–4.9)
ALBUMIN/GLOB SERPL: 1.4 G/DL
ALP SERPL-CCNC: 65 U/L (ref 30–99)
ALT SERPL-CCNC: 16 U/L (ref 2–50)
ANION GAP SERPL CALC-SCNC: 13 MMOL/L (ref 7–16)
APTT PPP: 24 SEC (ref 24.7–36)
AST SERPL-CCNC: 16 U/L (ref 12–45)
BASOPHILS # BLD AUTO: 0 % (ref 0–1.8)
BASOPHILS # BLD: 0 K/UL (ref 0–0.12)
BILIRUB SERPL-MCNC: 0.5 MG/DL (ref 0.1–1.5)
BLD GP AB SCN SERPL QL: NORMAL
BUN SERPL-MCNC: 11 MG/DL (ref 8–22)
CALCIUM ALBUM COR SERPL-MCNC: 9.3 MG/DL (ref 8.5–10.5)
CALCIUM SERPL-MCNC: 9.4 MG/DL (ref 8.5–10.5)
CHLORIDE SERPL-SCNC: 101 MMOL/L (ref 96–112)
CO2 SERPL-SCNC: 26 MMOL/L (ref 20–33)
CREAT SERPL-MCNC: 0.97 MG/DL (ref 0.5–1.4)
EOSINOPHIL # BLD AUTO: 0.1 K/UL (ref 0–0.51)
EOSINOPHIL NFR BLD: 0.9 % (ref 0–6.9)
ERYTHROCYTE [DISTWIDTH] IN BLOOD BY AUTOMATED COUNT: 45.4 FL (ref 35.9–50)
GFR SERPLBLD CREATININE-BSD FMLA CKD-EPI: 88 ML/MIN/1.73 M 2
GLOBULIN SER CALC-MCNC: 2.9 G/DL (ref 1.9–3.5)
GLUCOSE SERPL-MCNC: 106 MG/DL (ref 65–99)
HCT VFR BLD AUTO: 46.3 % (ref 42–52)
HGB BLD-MCNC: 15 G/DL (ref 14–18)
INR PPP: 0.91 (ref 0.87–1.13)
LACTATE SERPL-SCNC: 1.2 MMOL/L (ref 0.5–2)
LIPASE SERPL-CCNC: 25 U/L (ref 11–82)
LYMPHOCYTES # BLD AUTO: 2.7 K/UL (ref 1–4.8)
LYMPHOCYTES NFR BLD: 25.2 % (ref 22–41)
MANUAL DIFF BLD: NORMAL
MCH RBC QN AUTO: 28.6 PG (ref 27–33)
MCHC RBC AUTO-ENTMCNC: 32.4 G/DL (ref 32.3–36.5)
MCV RBC AUTO: 88.4 FL (ref 81.4–97.8)
MICROCYTES BLD QL SMEAR: ABNORMAL
MONOCYTES # BLD AUTO: 0.56 K/UL (ref 0–0.85)
MONOCYTES NFR BLD AUTO: 5.2 % (ref 0–13.4)
MORPHOLOGY BLD-IMP: NORMAL
NEUTROPHILS # BLD AUTO: 7.35 K/UL (ref 1.82–7.42)
NEUTROPHILS NFR BLD: 67.8 % (ref 44–72)
NEUTS BAND NFR BLD MANUAL: 0.9 % (ref 0–10)
NRBC # BLD AUTO: 0 K/UL
NRBC BLD-RTO: 0 /100 WBC (ref 0–0.2)
OVALOCYTES BLD QL SMEAR: NORMAL
PLATELET # BLD AUTO: 332 K/UL (ref 164–446)
PLATELET BLD QL SMEAR: NORMAL
PMV BLD AUTO: 8.7 FL (ref 9–12.9)
POIKILOCYTOSIS BLD QL SMEAR: NORMAL
POTASSIUM SERPL-SCNC: 4.1 MMOL/L (ref 3.6–5.5)
PROT SERPL-MCNC: 7 G/DL (ref 6–8.2)
PROTHROMBIN TIME: 12.2 SEC (ref 12–14.6)
RBC # BLD AUTO: 5.24 M/UL (ref 4.7–6.1)
RBC BLD AUTO: PRESENT
RH BLD: NORMAL
SODIUM SERPL-SCNC: 140 MMOL/L (ref 135–145)
WBC # BLD AUTO: 10.7 K/UL (ref 4.8–10.8)

## 2024-11-03 PROCEDURE — 86850 RBC ANTIBODY SCREEN: CPT

## 2024-11-03 PROCEDURE — 83605 ASSAY OF LACTIC ACID: CPT

## 2024-11-03 PROCEDURE — 96365 THER/PROPH/DIAG IV INF INIT: CPT | Mod: XU

## 2024-11-03 PROCEDURE — 85610 PROTHROMBIN TIME: CPT

## 2024-11-03 PROCEDURE — 80053 COMPREHEN METABOLIC PANEL: CPT

## 2024-11-03 PROCEDURE — 700117 HCHG RX CONTRAST REV CODE 255: Mod: UD | Performed by: EMERGENCY MEDICINE

## 2024-11-03 PROCEDURE — 36415 COLL VENOUS BLD VENIPUNCTURE: CPT

## 2024-11-03 PROCEDURE — 74177 CT ABD & PELVIS W/CONTRAST: CPT

## 2024-11-03 PROCEDURE — 86901 BLOOD TYPING SEROLOGIC RH(D): CPT

## 2024-11-03 PROCEDURE — 96375 TX/PRO/DX INJ NEW DRUG ADDON: CPT

## 2024-11-03 PROCEDURE — 700111 HCHG RX REV CODE 636 W/ 250 OVERRIDE (IP): Mod: JZ,UD | Performed by: EMERGENCY MEDICINE

## 2024-11-03 PROCEDURE — 85007 BL SMEAR W/DIFF WBC COUNT: CPT

## 2024-11-03 PROCEDURE — 85730 THROMBOPLASTIN TIME PARTIAL: CPT

## 2024-11-03 PROCEDURE — 99284 EMERGENCY DEPT VISIT MOD MDM: CPT

## 2024-11-03 PROCEDURE — 85027 COMPLETE CBC AUTOMATED: CPT

## 2024-11-03 PROCEDURE — 86900 BLOOD TYPING SEROLOGIC ABO: CPT | Mod: 91

## 2024-11-03 PROCEDURE — 83690 ASSAY OF LIPASE: CPT

## 2024-11-03 RX ORDER — CEFTRIAXONE 2 G/1
2000 INJECTION, POWDER, FOR SOLUTION INTRAMUSCULAR; INTRAVENOUS ONCE
Status: COMPLETED | OUTPATIENT
Start: 2024-11-03 | End: 2024-11-03

## 2024-11-03 RX ORDER — METRONIDAZOLE 500 MG/100ML
500 INJECTION, SOLUTION INTRAVENOUS EVERY 6 HOURS
Status: COMPLETED | OUTPATIENT
Start: 2024-11-03 | End: 2024-11-03

## 2024-11-03 RX ADMIN — CEFTRIAXONE SODIUM 2000 MG: 2 INJECTION, POWDER, FOR SOLUTION INTRAMUSCULAR; INTRAVENOUS at 14:49

## 2024-11-03 RX ADMIN — IOHEXOL 95 ML: 350 INJECTION, SOLUTION INTRAVENOUS at 14:15

## 2024-11-03 RX ADMIN — METRONIDAZOLE 500 MG: 500 INJECTION, SOLUTION INTRAVENOUS at 14:51

## 2024-11-03 NOTE — ED NOTES
Assumed care of a patient. Bedside report given from VENECIA Terry. Pt reports he wants to go home after his abx is done. Vs stable. Will continue to monitor.

## 2024-11-03 NOTE — ED NOTES
Med rec is complete per patient at bedside  Outpatient antibiotics within the last 30 days: NONE  Anticoagulants: NONE    Patient has not been taking levothyroxine for approximately 3 months.    Cesia Fox, PhT

## 2024-11-03 NOTE — ED NOTES
Bedside report to Emelina RN. Pt on HR, BP, oxygen, and cardiac monitor. Pt GCS 15 a/o x 4. Bed locked in lowest position side rails up x 2 call light within reach.

## 2024-11-04 NOTE — DISCHARGE INSTRUCTIONS
You are leaving AGAINST MEDICAL ADVICE.  You have risk worsening of your illness including increasing pain, bleeding, or complications of perforation or abscess.  Return if you like to complete your treatment.  Take antibiotics as prescribed.  Follow-up with your doctor.  Drink plenty of fluids.  Recommend you see your doctor for repeat colonoscopy.  He may return anytime if you have worsening.

## 2025-01-29 ENCOUNTER — TELEPHONE (OUTPATIENT)
Dept: INTERNAL MEDICINE | Facility: OTHER | Age: 65
End: 2025-01-29

## 2025-01-29 ENCOUNTER — HOSPITAL ENCOUNTER (OUTPATIENT)
Dept: LAB | Facility: MEDICAL CENTER | Age: 65
End: 2025-01-29
Payer: MEDICAID

## 2025-01-29 ENCOUNTER — OFFICE VISIT (OUTPATIENT)
Dept: INTERNAL MEDICINE | Facility: OTHER | Age: 65
End: 2025-01-29
Payer: MEDICAID

## 2025-01-29 VITALS
BODY MASS INDEX: 22.33 KG/M2 | SYSTOLIC BLOOD PRESSURE: 136 MMHG | HEIGHT: 70 IN | OXYGEN SATURATION: 97 % | HEART RATE: 62 BPM | DIASTOLIC BLOOD PRESSURE: 84 MMHG | TEMPERATURE: 98 F | WEIGHT: 156 LBS

## 2025-01-29 DIAGNOSIS — G89.29 CHRONIC PAIN OF BOTH SHOULDERS: ICD-10-CM

## 2025-01-29 DIAGNOSIS — E03.9 ACQUIRED HYPOTHYROIDISM: ICD-10-CM

## 2025-01-29 DIAGNOSIS — M25.512 CHRONIC PAIN OF BOTH SHOULDERS: ICD-10-CM

## 2025-01-29 DIAGNOSIS — M05.711 RHEUMATOID ARTHRITIS INVOLVING BOTH SHOULDERS WITH POSITIVE RHEUMATOID FACTOR (HCC): ICD-10-CM

## 2025-01-29 DIAGNOSIS — K52.9 COLITIS: ICD-10-CM

## 2025-01-29 DIAGNOSIS — Z79.891 CHRONIC USE OF OPIATE FOR THERAPEUTIC PURPOSE: ICD-10-CM

## 2025-01-29 DIAGNOSIS — M25.511 CHRONIC PAIN OF BOTH SHOULDERS: ICD-10-CM

## 2025-01-29 DIAGNOSIS — M05.712 RHEUMATOID ARTHRITIS INVOLVING BOTH SHOULDERS WITH POSITIVE RHEUMATOID FACTOR (HCC): ICD-10-CM

## 2025-01-29 DIAGNOSIS — K21.9 GASTROESOPHAGEAL REFLUX DISEASE, UNSPECIFIED WHETHER ESOPHAGITIS PRESENT: ICD-10-CM

## 2025-01-29 LAB
CRP SERPL HS-MCNC: 1.21 MG/DL (ref 0–0.75)
ERYTHROCYTE [SEDIMENTATION RATE] IN BLOOD BY WESTERGREN METHOD: 19 MM/HOUR (ref 0–20)
T4 FREE SERPL-MCNC: 0.5 NG/DL (ref 0.93–1.7)
TSH SERPL-ACNC: 36 UIU/ML (ref 0.35–5.5)

## 2025-01-29 PROCEDURE — 84443 ASSAY THYROID STIM HORMONE: CPT

## 2025-01-29 PROCEDURE — 85652 RBC SED RATE AUTOMATED: CPT

## 2025-01-29 PROCEDURE — 84439 ASSAY OF FREE THYROXINE: CPT

## 2025-01-29 PROCEDURE — 36415 COLL VENOUS BLD VENIPUNCTURE: CPT

## 2025-01-29 PROCEDURE — 86140 C-REACTIVE PROTEIN: CPT

## 2025-01-29 RX ORDER — HYDROCODONE BITARTRATE AND ACETAMINOPHEN 10; 325 MG/1; MG/1
1 TABLET ORAL 3 TIMES DAILY
COMMUNITY
Start: 2025-01-18

## 2025-01-29 RX ORDER — METHOTREXATE 2.5 MG/1
7.5 TABLET ORAL
Qty: 12 TABLET | Refills: 0 | Status: SHIPPED | OUTPATIENT
Start: 2025-01-29 | End: 2025-01-29

## 2025-01-29 RX ORDER — FOLIC ACID 1 MG/1
1 TABLET ORAL DAILY
Qty: 30 TABLET | Refills: 0 | Status: SHIPPED | OUTPATIENT
Start: 2025-01-29

## 2025-01-29 RX ORDER — METHOTREXATE 2.5 MG/1
7.5 TABLET ORAL
Qty: 21 TABLET | Refills: 0 | Status: SHIPPED | OUTPATIENT
Start: 2025-01-29

## 2025-01-29 RX ORDER — OFLOXACIN 3 MG/ML
1 SOLUTION/ DROPS OPHTHALMIC
COMMUNITY
Start: 2024-11-20 | End: 2025-01-29

## 2025-01-29 ASSESSMENT — ENCOUNTER SYMPTOMS
NECK PAIN: 1
BLOOD IN STOOL: 0
ABDOMINAL PAIN: 0
FEVER: 0
SHORTNESS OF BREATH: 0
DIARRHEA: 0

## 2025-01-29 ASSESSMENT — PATIENT HEALTH QUESTIONNAIRE - PHQ9: CLINICAL INTERPRETATION OF PHQ2 SCORE: 0

## 2025-01-29 ASSESSMENT — FIBROSIS 4 INDEX: FIB4 SCORE: 0.75

## 2025-01-29 NOTE — PATIENT INSTRUCTIONS
Rheumatology     STANISLAV CARTY  [address]  [place] [address]  Phone: [phone]

## 2025-01-29 NOTE — TELEPHONE ENCOUNTER
Attempted to complete PA for Methotrexate. Per cover my meds   Outcome  Additional Information Required  PA has been Approved. PA End Date: 01/29/2026

## 2025-01-29 NOTE — PROGRESS NOTES
Chief Complaint: Pain    History of Present Illness:   Christophe Leary is a 64 y.o. male who presents with diffuse pain ongoing for over a year.  Patient states that he will get pain randomly that he describes as shooting.  He notices it mostly in his neck right now that is worsening.  He will also notice his left elbow, chest, and foot.  He does have history of rheumatoid arthritis but has had been having difficulties getting established with a rheumatologist.      Past Medical History:   Diagnosis Date    Fibromyalgia     GERD (gastroesophageal reflux disease)     Hypertension     medicated    Hypothyroidism     Rheumatoid arthritis(714.0)        Past Surgical History:   Procedure Laterality Date    KNEE ARTHROPLASTY TOTAL  2013    right    OTHER ORTHOPEDIC SURGERY      knees, elbow, wrist       Family History   Problem Relation Age of Onset    Heart Disease Father        Social History     Socioeconomic History    Marital status: Single     Spouse name: Not on file    Number of children: Not on file    Years of education: Not on file    Highest education level: Not on file   Occupational History    Occupation:    Tobacco Use    Smoking status: Never    Smokeless tobacco: Never   Vaping Use    Vaping status: Never Used   Substance and Sexual Activity    Alcohol use: Yes     Alcohol/week: 0.0 oz     Comment: rarely    Drug use: No     Comment: METH AMPHETAMINE 20 YRS AGO    Sexual activity: Yes     Partners: Female   Other Topics Concern    Not on file   Social History Narrative    Not on file     Social Drivers of Health     Financial Resource Strain: Not on file   Food Insecurity: Not on file   Transportation Needs: Not on file   Physical Activity: Not on file   Stress: Not on file   Social Connections: Not on file   Intimate Partner Violence: Not on file   Housing Stability: Not on file       Current Outpatient Medications   Medication Sig Dispense Refill    HYDROcodone/acetaminophen  "(NORCO)  MG Tab Take 1 Tablet by mouth 3 times a day.      omeprazole (PRILOSEC) 20 MG delayed-release capsule Take 1 Capsule by mouth every day. 30 Capsule 1    methotrexate 2.5 MG tablet Take 3 Tablets by mouth every 7 days. 12 Tablet 0    folic acid (FOLVITE) 1 MG Tab Take 1 Tablet by mouth every day. 30 Tablet 0    morphine ER (MS CONTIN) 30 MG Tab CR tablet Take 30 mg by mouth every 12 hours.      levothyroxine (SYNTHROID) 112 MCG Tab Take 1 Tablet by mouth every morning on an empty stomach. (Patient not taking: Reported on 11/3/2024) 30 Tablet 1     No current facility-administered medications for this visit.       No Known Allergies    Review of Systems:  Review of Systems   Constitutional:  Negative for fever.   Respiratory:  Negative for shortness of breath.    Cardiovascular:  Positive for chest pain.   Gastrointestinal:  Negative for abdominal pain, blood in stool and diarrhea.   Musculoskeletal:  Positive for joint pain and neck pain.        Medications:     Current Outpatient Medications:     HYDROcodone/acetaminophen, 1 Tablet, Oral, TID, Taking    omeprazole, 20 mg, Oral, DAILY, Taking    methotrexate, 7.5 mg, Oral, Q7 DAYS, Taking    folic acid, 1 mg, Oral, DAILY, Taking    morphine ER, 30 mg, Oral, Q12HRS, Taking    levothyroxine, 112 mcg, Oral, AM ES (Patient not taking: Reported on 11/3/2024), Not Taking     Objective:  Vitals:   /84 (BP Location: Left arm, Patient Position: Sitting, BP Cuff Size: Adult)   Pulse 62   Temp 36.7 °C (98 °F) (Temporal)   Ht 1.778 m (5' 10\")   Wt 70.8 kg (156 lb)   SpO2 97%  Body mass index is 22.38 kg/m².    Physical Exam  Constitutional:       General: He is not in acute distress.     Appearance: Normal appearance.   HENT:      Head: Normocephalic and atraumatic.   Eyes:      Conjunctiva/sclera: Conjunctivae normal.   Cardiovascular:      Rate and Rhythm: Normal rate.   Pulmonary:      Effort: Pulmonary effort is normal.   Abdominal:      General: " There is no distension.   Musculoskeletal:         General: Tenderness and deformity present.      Comments: Ulnar deviation of bilateral hands  Enlarged MCP joints     Neurological:      General: No focal deficit present.      Mental Status: He is oriented to person, place, and time.          Results:    Lab Results   Component Value Date/Time    CHOLSTRLTOT 158 08/15/2023 12:11 PM     (H) 08/15/2023 12:11 PM    HDL 39 (A) 08/15/2023 12:11 PM    TRIGLYCERIDE 94 08/15/2023 12:11 PM       Lab Results   Component Value Date/Time    SODIUM 140 11/03/2024 11:24 AM    POTASSIUM 4.1 11/03/2024 11:24 AM    CHLORIDE 101 11/03/2024 11:24 AM    CO2 26 11/03/2024 11:24 AM    GLUCOSE 106 (H) 11/03/2024 11:24 AM    BUN 11 11/03/2024 11:24 AM    CREATININE 0.97 11/03/2024 11:24 AM    CREATININE 1.0 10/30/2008 08:55 PM     Lab Results   Component Value Date/Time    ALKPHOSPHAT 65 11/03/2024 11:24 AM    ASTSGOT 16 11/03/2024 11:24 AM    ALTSGPT 16 11/03/2024 11:24 AM    TBILIRUBIN 0.5 11/03/2024 11:24 AM        Lab Results   Component Value Date/Time    WBC 10.7 11/03/2024 11:24 AM    RBC 5.24 11/03/2024 11:24 AM    HEMOGLOBIN 15.0 11/03/2024 11:24 AM    HEMATOCRIT 46.3 11/03/2024 11:24 AM    MCV 88.4 11/03/2024 11:24 AM    MCH 28.6 11/03/2024 11:24 AM    MCHC 32.4 11/03/2024 11:24 AM    MPV 8.7 (L) 11/03/2024 11:24 AM    NEUTSPOLYS 67.80 11/03/2024 11:24 AM    LYMPHOCYTES 25.20 11/03/2024 11:24 AM    MONOCYTES 5.20 11/03/2024 11:24 AM    EOSINOPHILS 0.90 11/03/2024 11:24 AM    BASOPHILS 0.00 11/03/2024 11:24 AM    HYPOCHROMIA 1+ 03/02/2010 05:00 PM    ANISOCYTOSIS 1+ 11/01/2017 10:31 PM        Assessment and Plan:    #rheumatoid arthritis  Rheumatoid factor (5/26/21): 282  CRP (5/26/21): 3.13  CCP (10/23/2023): 239  Patient with history of rheumatoid arthritis previously followed by Dr. Gomez (rheumatologist). He was on immunotherapy, but discontinued during COVID because he did not want to catch COVID or being  immunosuppressed.  He has since been lost to follow-up.  Attempted to get him reestablished with rheumatology, however there have been issues with patient preference and insurance  -Patient requesting new referral to [facility] rheumatology which will be ordered  -Appears referral for Dr. Gomez, rheumatology has been approved, information provided on discharge paperwork  -Will initiate methotrexate 7.5 mg weekly in the interim while patient gets established with rheumatology.  Will provide folic acid supplementation as well  -Obtaining CRP and ESR to evaluate severity of inflammation  -Follow-up in 4 weeks to assess tolerability to methotrexate.  If tolerating okay can likely continue on the 7.5 mg weekly.  If not helping much, can consider increasing dose by 2.5 to 5 mg    #Chronic pain  Patient reporting pain all over, recently most noticeable in his neck that has been worsening.  He describes shooting pains in random spots all over his body, points out that it can occur in his foot, chest, and left elbow.  Already established with pain management and receiving narcotics.  Will defer to them for their expertise  Part of his pain likely related to his rheumatoid arthritis as well     #Hypothyroidism  Patient has not been regularly taking his Synthroid 112 mcg daily  Most recent TSH in October 2023 was actually low  - Repeat thyroid panel    #GERD  Mostly triggered by coffee  Was on omeprazole in the past with good relief and requesting to trial it again  - Advised to avoid triggering foods, eating smaller portions, and avoid eating near bedtime  - Start omeprazole 20 mg daily if conservative measures fail    #Weight loss  On previous visit patient had 26 pound unintentional weight loss in the past 2 years.  Had a colonoscopy in 2018 that showed edematous polyp with recommendation of follow-up in 5 years.  Referral to GI sent and approved.    Patient continues to want to defer seeing GI until he gets his pain  resolved    #Rectosigmoid colitis  On 11/2024, patient went to the ED due to bloody diarrhea and abdominal pain and had a CT scan that had findings consistent with rectosigmoid colitis.  He was recommended to be hospitalized but left AMA   At this visit, patient states his symptoms have resolved  - Reiterated importance of getting GI follow-up, however patient continues to defer at this time due to wanting to get his pain resolved first    Follow Up:  Return in about 4 weeks (around 2/26/2025) for RA on methotrexate monitoring; .

## 2025-01-30 DIAGNOSIS — E03.9 HYPOTHYROIDISM, UNSPECIFIED TYPE: ICD-10-CM

## 2025-01-30 RX ORDER — LEVOTHYROXINE SODIUM 112 UG/1
112 TABLET ORAL
Qty: 90 TABLET | Refills: 1 | Status: SHIPPED | OUTPATIENT
Start: 2025-01-30

## 2025-04-21 ENCOUNTER — OFFICE VISIT (OUTPATIENT)
Dept: INTERNAL MEDICINE | Facility: OTHER | Age: 65
End: 2025-04-21
Payer: MEDICAID

## 2025-04-21 ENCOUNTER — HOSPITAL ENCOUNTER (OUTPATIENT)
Facility: MEDICAL CENTER | Age: 65
End: 2025-04-21
Payer: MEDICAID

## 2025-04-21 VITALS
TEMPERATURE: 99 F | HEIGHT: 70 IN | OXYGEN SATURATION: 97 % | BODY MASS INDEX: 21.33 KG/M2 | HEART RATE: 65 BPM | WEIGHT: 149 LBS | DIASTOLIC BLOOD PRESSURE: 81 MMHG | SYSTOLIC BLOOD PRESSURE: 137 MMHG

## 2025-04-21 DIAGNOSIS — E03.9 ACQUIRED HYPOTHYROIDISM: ICD-10-CM

## 2025-04-21 DIAGNOSIS — M05.711 RHEUMATOID ARTHRITIS INVOLVING BOTH SHOULDERS WITH POSITIVE RHEUMATOID FACTOR (HCC): ICD-10-CM

## 2025-04-21 DIAGNOSIS — K21.9 GASTROESOPHAGEAL REFLUX DISEASE, UNSPECIFIED WHETHER ESOPHAGITIS PRESENT: ICD-10-CM

## 2025-04-21 DIAGNOSIS — M25.511 CHRONIC PAIN OF BOTH SHOULDERS: ICD-10-CM

## 2025-04-21 DIAGNOSIS — Z79.891 CHRONIC USE OF OPIATE FOR THERAPEUTIC PURPOSE: ICD-10-CM

## 2025-04-21 DIAGNOSIS — Z12.11 ENCOUNTER FOR SCREENING COLONOSCOPY: ICD-10-CM

## 2025-04-21 DIAGNOSIS — G89.29 CHRONIC PAIN OF BOTH SHOULDERS: ICD-10-CM

## 2025-04-21 DIAGNOSIS — M25.512 CHRONIC PAIN OF BOTH SHOULDERS: ICD-10-CM

## 2025-04-21 DIAGNOSIS — T88.7XXA MEDICATION SIDE EFFECT: ICD-10-CM

## 2025-04-21 DIAGNOSIS — M05.712 RHEUMATOID ARTHRITIS INVOLVING BOTH SHOULDERS WITH POSITIVE RHEUMATOID FACTOR (HCC): ICD-10-CM

## 2025-04-21 PROCEDURE — 99213 OFFICE O/P EST LOW 20 MIN: CPT | Mod: GE

## 2025-04-21 PROCEDURE — 3079F DIAST BP 80-89 MM HG: CPT | Mod: GE

## 2025-04-21 PROCEDURE — 3075F SYST BP GE 130 - 139MM HG: CPT | Mod: GE

## 2025-04-21 PROCEDURE — G0480 DRUG TEST DEF 1-7 CLASSES: HCPCS

## 2025-04-21 PROCEDURE — 80307 DRUG TEST PRSMV CHEM ANLYZR: CPT

## 2025-04-21 RX ORDER — HYDROCODONE BITARTRATE AND ACETAMINOPHEN 10; 325 MG/1; MG/1
1 TABLET ORAL 3 TIMES DAILY
Qty: 90 TABLET | Refills: 0 | Status: CANCELLED | OUTPATIENT
Start: 2025-04-21 | End: 2025-05-21

## 2025-04-21 RX ORDER — HYDROCODONE BITARTRATE AND ACETAMINOPHEN 10; 325 MG/1; MG/1
1 TABLET ORAL
Qty: 30 TABLET | Refills: 0 | Status: SHIPPED | OUTPATIENT
Start: 2025-04-21 | End: 2025-05-21

## 2025-04-21 RX ORDER — METHOTREXATE 2.5 MG/1
7.5 TABLET ORAL
Qty: 21 TABLET | Refills: 0 | Status: SHIPPED | OUTPATIENT
Start: 2025-04-21

## 2025-04-21 RX ORDER — MORPHINE SULFATE 30 MG/1
30 TABLET, FILM COATED, EXTENDED RELEASE ORAL EVERY 12 HOURS
Qty: 60 TABLET | Refills: 0 | Status: SHIPPED | OUTPATIENT
Start: 2025-04-21 | End: 2025-05-21

## 2025-04-21 RX ORDER — OMEPRAZOLE 20 MG/1
20 CAPSULE, DELAYED RELEASE ORAL DAILY
Qty: 30 CAPSULE | Refills: 1 | Status: SHIPPED | OUTPATIENT
Start: 2025-04-21

## 2025-04-21 RX ORDER — FOLIC ACID 1 MG/1
1 TABLET ORAL DAILY
Qty: 30 TABLET | Refills: 0 | Status: SHIPPED | OUTPATIENT
Start: 2025-04-21

## 2025-04-21 ASSESSMENT — ENCOUNTER SYMPTOMS
NAUSEA: 0
NECK PAIN: 1
VOMITING: 0
SHORTNESS OF BREATH: 0
BACK PAIN: 1
FEVER: 0

## 2025-04-21 ASSESSMENT — FIBROSIS 4 INDEX: FIB4 SCORE: 0.75

## 2025-04-21 NOTE — PATIENT INSTRUCTIONS
Rheumatology [facility]  [address], [address]  [place] [address]  Phone: [phone]

## 2025-04-21 NOTE — PROGRESS NOTES
History of Present Illness:   Christophe Leary is a 64 y.o. male who presents with follow-up for chronic pain.  Patient states that he does not want to go back to his old pain management as he felt that they were not communicative.  He is taking MS Contin and Norco and notes that they only slightly alleviate his pain.  He was on methotrexate his last visit due to rheumatoid arthritis and reports that he did not feel any different while he was on it.      Past Medical History:   Diagnosis Date    Fibromyalgia     GERD (gastroesophageal reflux disease)     Hypertension     medicated    Hypothyroidism     Rheumatoid arthritis(714.0)        Past Surgical History:   Procedure Laterality Date    KNEE ARTHROPLASTY TOTAL  2013    right    OTHER ORTHOPEDIC SURGERY      knees, elbow, wrist       Family History   Problem Relation Age of Onset    Heart Disease Father        Social History     Socioeconomic History    Marital status: Single     Spouse name: Not on file    Number of children: Not on file    Years of education: Not on file    Highest education level: Not on file   Occupational History    Occupation:    Tobacco Use    Smoking status: Never    Smokeless tobacco: Never   Vaping Use    Vaping status: Never Used   Substance and Sexual Activity    Alcohol use: Not Currently     Comment: rarely    Drug use: No     Comment: METH AMPHETAMINE 20 YRS AGO    Sexual activity: Yes     Partners: Female   Other Topics Concern    Not on file   Social History Narrative    Not on file     Social Drivers of Health     Financial Resource Strain: Not on file   Food Insecurity: Not on file   Transportation Needs: Not on file   Physical Activity: Not on file   Stress: Not on file   Social Connections: Not on file   Intimate Partner Violence: Not on file   Housing Stability: Not on file       Current Outpatient Medications   Medication Sig Dispense Refill    morphine ER (MS CONTIN) 30 MG Tab CR tablet Take 1  "Tablet by mouth every 12 hours for 30 days. 60 Tablet 0    folic acid (FOLVITE) 1 MG Tab Take 1 Tablet by mouth every day. 30 Tablet 0    omeprazole (PRILOSEC) 20 MG delayed-release capsule Take 1 Capsule by mouth every day. 30 Capsule 1    methotrexate 2.5 MG tablet Take 3 Tablets by mouth every 7 days. 21 Tablet 0    HYDROcodone/acetaminophen (NORCO)  MG Tab Take 1 Tablet by mouth 1 time a day as needed for Severe Pain for up to 30 days. 30 Tablet 0    levothyroxine (SYNTHROID) 112 MCG Tab Take 1 Tablet by mouth every morning on an empty stomach. 90 Tablet 1     No current facility-administered medications for this visit.       No Known Allergies    Review of Systems:  Review of Systems   Constitutional:  Negative for fever.   Respiratory:  Negative for shortness of breath.    Gastrointestinal:  Negative for nausea and vomiting.   Musculoskeletal:  Positive for back pain, joint pain and neck pain.        Medications:     Current Outpatient Medications:     morphine ER, 30 mg, Oral, Q12HRS, Taking    folic acid, 1 mg, Oral, DAILY, Taking    omeprazole, 20 mg, Oral, DAILY, Taking    methotrexate, 7.5 mg, Oral, Q7 DAYS, Taking    HYDROcodone/acetaminophen, 1 Tablet, Oral, QDAY PRN, Taking As Needed    levothyroxine, 112 mcg, Oral, AM ES, Taking     Objective:  Vitals:   /81 (BP Location: Left arm, Patient Position: Sitting, BP Cuff Size: Adult)   Pulse 65   Temp 37.2 °C (99 °F) (Temporal)   Ht 1.778 m (5' 10\")   Wt 67.6 kg (149 lb)   SpO2 97%  Body mass index is 21.38 kg/m².    Physical Exam  Constitutional:       General: He is not in acute distress.  HENT:      Head: Normocephalic and atraumatic.   Eyes:      Conjunctiva/sclera: Conjunctivae normal.   Cardiovascular:      Heart sounds: Normal heart sounds.   Pulmonary:      Breath sounds: Normal breath sounds.   Musculoskeletal:         General: Tenderness and deformity present.      Comments: Ulnar deviation of bilateral hands  Enlarged MCP " joints     Neurological:      General: No focal deficit present.      Mental Status: He is oriented to person, place, and time.          Results:    Lab Results   Component Value Date/Time    CHOLSTRLTOT 158 08/15/2023 12:11 PM     (H) 08/15/2023 12:11 PM    HDL 39 (A) 08/15/2023 12:11 PM    TRIGLYCERIDE 94 08/15/2023 12:11 PM       Lab Results   Component Value Date/Time    SODIUM 140 11/03/2024 11:24 AM    POTASSIUM 4.1 11/03/2024 11:24 AM    CHLORIDE 101 11/03/2024 11:24 AM    CO2 26 11/03/2024 11:24 AM    GLUCOSE 106 (H) 11/03/2024 11:24 AM    BUN 11 11/03/2024 11:24 AM    CREATININE 0.97 11/03/2024 11:24 AM    CREATININE 1.0 10/30/2008 08:55 PM     Lab Results   Component Value Date/Time    ALKPHOSPHAT 65 11/03/2024 11:24 AM    ASTSGOT 16 11/03/2024 11:24 AM    ALTSGPT 16 11/03/2024 11:24 AM    TBILIRUBIN 0.5 11/03/2024 11:24 AM        Lab Results   Component Value Date/Time    WBC 10.7 11/03/2024 11:24 AM    RBC 5.24 11/03/2024 11:24 AM    HEMOGLOBIN 15.0 11/03/2024 11:24 AM    HEMATOCRIT 46.3 11/03/2024 11:24 AM    MCV 88.4 11/03/2024 11:24 AM    MCH 28.6 11/03/2024 11:24 AM    MCHC 32.4 11/03/2024 11:24 AM    MPV 8.7 (L) 11/03/2024 11:24 AM    NEUTSPOLYS 67.80 11/03/2024 11:24 AM    LYMPHOCYTES 25.20 11/03/2024 11:24 AM    MONOCYTES 5.20 11/03/2024 11:24 AM    EOSINOPHILS 0.90 11/03/2024 11:24 AM    BASOPHILS 0.00 11/03/2024 11:24 AM    HYPOCHROMIA 1+ 03/02/2010 05:00 PM    ANISOCYTOSIS 1+ 11/01/2017 10:31 PM        Assessment and Plan:    #rheumatoid arthritis  Rheumatoid factor (5/26/21): 282  CRP (5/26/21): 3.13 -> 1.21 (1/29/25)  CCP (10/23/2023): 239  Patient with history of rheumatoid arthritis previously followed by Dr. Gomez (rheumatologist). He was on immunotherapy, but discontinued during COVID because he did not want to catch COVID or be immunosuppressed.  He has since been lost to follow-up.  Attempted to get him reestablished with rheumatology, however there have been issues with patient  preference and insurance  -Rheumatology referral has been approved, information provided  - Continue methotrexate 7.5 mg weekly along with folic acid supplementation  - Follow-up in 4 weeks to assess symptoms on methotrexate     #Chronic pain  Patient reporting pain all over, recently most noticeable in his neck that has been worsening.  He describes shooting pains in random spots all over his body, points out that it can occur in his foot, chest, and left elbow.  Was following with pain management and taking MS Contin and Norco  Unfortunately, had a falling out with his previous pain management and would like a new referral  - Will continue his MS Contin and Norco for this month while he reestablishes with pain management.  New referral sent  -Controlled substance agreement filled out and UDS sent    #Hypothyroidism  (1/2025): TSH 36 with free T4 0.50, restarted on Synthroid  -Continue Synthroid 112 mcg daily  - Repeat thyroid panel     #GERD  Mostly triggered by coffee  Taking omeprazole  - Advised to avoid triggering foods, eating smaller portions, and avoid eating near bedtime  - Continue omeprazole     #Weight loss  On previous visit patient had 26 pound unintentional weight loss in the past 2 years.  Had a colonoscopy in 2018 that showed edematous polyp with recommendation of follow-up in 5 years.  Referral to GI sent and approved.    Patient continues to want to defer seeing GI until he gets his pain resolved     #Rectosigmoid colitis  On 11/2024, patient went to the ED due to bloody diarrhea and abdominal pain and had a CT scan that had findings consistent with rectosigmoid colitis.  He was recommended to be hospitalized but left AMA   Patient states his symptoms have resolved  - Reiterated importance of getting GI follow-up, however patient continues to defer at this time due to wanting to get his pain resolved first    Follow Up:  Return in about 4 weeks (around 5/19/2025).

## 2025-04-23 LAB
AMPHET CTO UR CFM-MCNC: NEGATIVE NG/ML
BARBITURATES CTO UR CFM-MCNC: NEGATIVE NG/ML
BENZODIAZ CTO UR CFM-MCNC: NEGATIVE NG/ML
CANNABINOIDS CTO UR CFM-MCNC: NEGATIVE NG/ML
COCAINE CTO UR CFM-MCNC: NEGATIVE NG/ML
CREAT UR-MCNC: 180.3 MG/DL (ref 20–400)
DRUG COMMENT 753798: NORMAL
METHADONE CTO UR CFM-MCNC: NEGATIVE NG/ML
OPIATES CTO UR CFM-MCNC: NORMAL NG/ML
PCP CTO UR CFM-MCNC: NEGATIVE NG/ML
PROPOXYPH CTO UR CFM-MCNC: NEGATIVE NG/ML

## 2025-04-24 NOTE — Clinical Note
REFERRAL APPROVAL NOTICE         Sent on April 24, 2025                   Christophe Jeevan Leary  [address] [place] [address]                   Dear Mr. Leary,    After a careful review of the medical information and benefit coverage, [facility] has processed your referral. See below for additional details.    If applicable, you must be actively enrolled with your insurance for coverage of the authorized service. If you have any questions regarding your coverage, please contact your insurance directly.    REFERRAL INFORMATION   Referral #:  [id number]  Referred-To Provider    Referred-By Provider:  [facility]    Valerio Garcia D.O.   [facility]      [address] [facility] [place] [address]  [phone] [address]  [place] [address]  [phone]    Referral Start Date:  04/21/2025  Referral End Date:   04/21/2026             SCHEDULING  If you do not already have an appointment, please call [phone] to make an appointment.     MORE INFORMATION  If you do not already have a [facility] account, sign up at: [email or web address]  You can access your medical information, make appointments, see lab results, billing information, and more.  If you have questions regarding this referral, please contact  the [facility] Referrals department at:             [phone]. Monday - Friday 8:00AM - 5:00PM.     Sincerely,    [facility]

## 2025-04-26 LAB
6MAM UR CFM-MCNC: <10 NG/ML
CODEINE UR CFM-MCNC: <20 NG/ML
HYDROCODONE UR CFM-MCNC: <20 NG/ML
HYDROMORPHONE UR CFM-MCNC: 29 NG/ML
MORPHINE UR CFM-MCNC: 3618 NG/ML
NORHYDROCODONE UR CFM-MCNC: <20 NG/ML
NOROXYCODONE UR CFM-MCNC: <20 NG/ML
OPIATES UR NOROXYM Q0836: <20 NG/ML
OXYCODONE UR CFM-MCNC: <20 NG/ML
OXYMORPHONE UR CFM-MCNC: <20 NG/ML

## 2025-05-19 ENCOUNTER — OFFICE VISIT (OUTPATIENT)
Dept: INTERNAL MEDICINE | Facility: OTHER | Age: 65
End: 2025-05-19
Payer: MEDICAID

## 2025-05-19 ENCOUNTER — APPOINTMENT (OUTPATIENT)
Dept: INTERNAL MEDICINE | Facility: OTHER | Age: 65
End: 2025-05-19
Payer: MEDICAID

## 2025-05-19 VITALS
BODY MASS INDEX: 21.5 KG/M2 | DIASTOLIC BLOOD PRESSURE: 77 MMHG | OXYGEN SATURATION: 97 % | TEMPERATURE: 98.3 F | HEART RATE: 56 BPM | HEIGHT: 70 IN | WEIGHT: 150.2 LBS | SYSTOLIC BLOOD PRESSURE: 125 MMHG

## 2025-05-19 DIAGNOSIS — M25.511 CHRONIC PAIN OF BOTH SHOULDERS: ICD-10-CM

## 2025-05-19 DIAGNOSIS — G89.29 CHRONIC PAIN OF BOTH SHOULDERS: ICD-10-CM

## 2025-05-19 DIAGNOSIS — Z79.891 CHRONIC USE OF OPIATE FOR THERAPEUTIC PURPOSE: ICD-10-CM

## 2025-05-19 DIAGNOSIS — E03.9 ACQUIRED HYPOTHYROIDISM: Primary | ICD-10-CM

## 2025-05-19 DIAGNOSIS — M25.512 CHRONIC PAIN OF BOTH SHOULDERS: ICD-10-CM

## 2025-05-19 PROCEDURE — 3078F DIAST BP <80 MM HG: CPT | Mod: GE

## 2025-05-19 PROCEDURE — 3074F SYST BP LT 130 MM HG: CPT | Mod: GE

## 2025-05-19 PROCEDURE — 99213 OFFICE O/P EST LOW 20 MIN: CPT | Mod: GE

## 2025-05-19 RX ORDER — MORPHINE SULFATE 30 MG/1
30 TABLET, FILM COATED, EXTENDED RELEASE ORAL EVERY 12 HOURS
Qty: 60 TABLET | Refills: 0 | Status: SHIPPED | OUTPATIENT
Start: 2025-05-19 | End: 2025-06-18

## 2025-05-19 RX ORDER — HYDROCODONE BITARTRATE AND ACETAMINOPHEN 10; 325 MG/1; MG/1
1 TABLET ORAL
Qty: 30 TABLET | Refills: 0 | Status: SHIPPED | OUTPATIENT
Start: 2025-05-19 | End: 2025-06-18

## 2025-05-19 ASSESSMENT — ENCOUNTER SYMPTOMS
FEVER: 0
CHILLS: 0
COUGH: 0
WHEEZING: 0
SHORTNESS OF BREATH: 0

## 2025-05-19 ASSESSMENT — FIBROSIS 4 INDEX: FIB4 SCORE: 0.76

## 2025-05-19 NOTE — PROGRESS NOTES
"    ESTABLISHED PATIENT VISIT    PATIENT ID:  Name: Christophe Leary  YOB: 1960  Patient Care Team:  Valerio Garcia D.O. as PCP - General (Internal Medicine)    CHIEF COMPLAINT(s):  Follow-Up (Follow up- no new concerns )    Follow up for The primary encounter diagnosis was Acquired hypothyroidism. Diagnoses of Chronic use of opiate for therapeutic purpose and Chronic pain of both shoulders were also pertinent to this visit.    History of Present Illness:    This is a pleasant 65 y.o. male clinic patient established with my colleague Dr. Garcia who presents today for follow up and medication refill.    Patient Active Problem List    Diagnosis Date Noted    Colitis 01/29/2025    Benign prostatic hyperplasia 04/08/2024    Constipation 07/25/2018    Chronic pain of both shoulders 05/31/2017    Chronic use of opiate for therapeutic purpose 02/13/2017    Chronic pain of right knee 09/20/2016    Headache, migraine 04/29/2016    Fibromyalgia 04/29/2016    Acquired hypothyroidism 11/12/2015    Rheumatoid arthritis (HCC) 07/07/2009    GERD (gastroesophageal reflux disease) 07/07/2009    Dyslipidemia 07/07/2009     Review of Systems   Constitutional:  Negative for chills and fever.   HENT:  Negative for congestion.    Respiratory:  Negative for cough, shortness of breath and wheezing.    Cardiovascular:  Negative for chest pain.       Past Medical History[1]  Past Surgical History[2]  Family History   Problem Relation Age of Onset    Heart Disease Father      Patient has no known allergies.  Social History[3]  Current Medications[4]    OBJECTIVE:  /77 (BP Location: Left arm, Patient Position: Sitting, BP Cuff Size: Adult)   Pulse (!) 56   Temp 36.8 °C (98.3 °F) (Temporal)   Ht 1.778 m (5' 10\")   Wt 68.1 kg (150 lb 3.2 oz)   SpO2 97%   BMI 21.55 kg/m²   Physical Exam  Vitals reviewed.   Constitutional:       General: He is not in acute distress.     Appearance: Normal appearance. He is normal " weight. He is not ill-appearing or toxic-appearing.   HENT:      Head: Normocephalic.      Nose: Nose normal.      Mouth/Throat:      Pharynx: Oropharynx is clear.   Eyes:      Conjunctiva/sclera: Conjunctivae normal.   Cardiovascular:      Rate and Rhythm: Normal rate.   Pulmonary:      Effort: Pulmonary effort is normal. No respiratory distress.      Breath sounds: No wheezing.   Musculoskeletal:      Cervical back: Normal range of motion.   Skin:     General: Skin is warm and dry.      Coloration: Skin is not jaundiced or pale.   Neurological:      General: No focal deficit present.      Mental Status: He is alert and oriented to person, place, and time.      Gait: Gait normal.   Psychiatric:         Mood and Affect: Mood normal.         Behavior: Behavior normal.         ASSESSMENT AND PLAN:     Problem List Items Addressed This Visit       #Acquired hypothyroidism - Primary  (1/2025): TSH 36 with free T4 0.50, restarted on Synthroid  -Continue Synthroid 112 mcg daily  - Repeat thyroid panel    Relevant Orders    TSH WITH REFLEX TO FT4     #Chronic use of opiate for therapeutic purpose  #Chronic pain  Patient reporting pain all over, recently most noticeable in his neck that has been worsening.  He describes shooting pains in random spots all over his body, points out that it can occur in his foot, chest, and left elbow.  Was following with pain management and taking MS Contin and Norco  Unfortunately, had a falling out with his previous pain management and would like a new referral  - Will continue his MS Contin and Norco for this month while he reestablishes with pain management.    - contact information for new referral provided to patient  - CS agreement signed last visit  - UDS, PDMP, and refill request timing appropriate         Relevant Medications    HYDROcodone/acetaminophen (NORCO)  MG Tab    morphine ER (MS CONTIN) 30 MG Tab CR tablet                       Orders Placed This Encounter    TSH  WITH REFLEX TO FT4    HYDROcodone/acetaminophen (NORCO)  MG Tab    morphine ER (MS CONTIN) 30 MG Tab CR tablet     Return in about 4 weeks (around 6/16/2025).    Pamela Luke M.D.  [facility] Internal Medicine Resident       [1]   Past Medical History:  Diagnosis Date    Fibromyalgia     GERD (gastroesophageal reflux disease)     Hypertension     medicated    Hypothyroidism     Rheumatoid arthritis(714.0)    [2]   Past Surgical History:  Procedure Laterality Date    KNEE ARTHROPLASTY TOTAL  2013    right    OTHER ORTHOPEDIC SURGERY      knees, elbow, wrist   [3]   Social History  Tobacco Use    Smoking status: Never    Smokeless tobacco: Never   Vaping Use    Vaping status: Never Used   Substance Use Topics    Alcohol use: Not Currently     Comment: rarely    Drug use: No     Comment: METH AMPHETAMINE 20 YRS AGO   [4]   Current Outpatient Medications   Medication Sig Dispense Refill    HYDROcodone/acetaminophen (NORCO)  MG Tab Take 1 Tablet by mouth 1 time a day as needed for Severe Pain for up to 30 days. 30 Tablet 0    morphine ER (MS CONTIN) 30 MG Tab CR tablet Take 1 Tablet by mouth every 12 hours for 30 days. 60 Tablet 0    folic acid (FOLVITE) 1 MG Tab Take 1 Tablet by mouth every day. 30 Tablet 0    omeprazole (PRILOSEC) 20 MG delayed-release capsule Take 1 Capsule by mouth every day. 30 Capsule 1    methotrexate 2.5 MG tablet Take 3 Tablets by mouth every 7 days. 21 Tablet 0    levothyroxine (SYNTHROID) 112 MCG Tab Take 1 Tablet by mouth every morning on an empty stomach. 90 Tablet 1     No current facility-administered medications for this visit.

## 2025-05-19 NOTE — PATIENT INSTRUCTIONS
[facility]  [address]  [place] [address]  Phone: [phone]

## 2025-06-10 ENCOUNTER — OFFICE VISIT (OUTPATIENT)
Dept: INTERNAL MEDICINE | Facility: OTHER | Age: 65
End: 2025-06-10
Payer: MEDICAID

## 2025-06-10 VITALS
TEMPERATURE: 97.8 F | DIASTOLIC BLOOD PRESSURE: 71 MMHG | HEIGHT: 70 IN | WEIGHT: 149 LBS | OXYGEN SATURATION: 97 % | HEART RATE: 53 BPM | SYSTOLIC BLOOD PRESSURE: 123 MMHG | BODY MASS INDEX: 21.33 KG/M2

## 2025-06-10 DIAGNOSIS — M05.712 RHEUMATOID ARTHRITIS INVOLVING BOTH SHOULDERS WITH POSITIVE RHEUMATOID FACTOR (HCC): Primary | ICD-10-CM

## 2025-06-10 DIAGNOSIS — M05.711 RHEUMATOID ARTHRITIS INVOLVING BOTH SHOULDERS WITH POSITIVE RHEUMATOID FACTOR (HCC): Primary | ICD-10-CM

## 2025-06-10 DIAGNOSIS — Z79.891 CHRONIC USE OF OPIATE FOR THERAPEUTIC PURPOSE: ICD-10-CM

## 2025-06-10 DIAGNOSIS — M79.7 FIBROMYALGIA: ICD-10-CM

## 2025-06-10 DIAGNOSIS — E03.9 ACQUIRED HYPOTHYROIDISM: ICD-10-CM

## 2025-06-10 DIAGNOSIS — M25.512 CHRONIC PAIN OF BOTH SHOULDERS: ICD-10-CM

## 2025-06-10 DIAGNOSIS — M25.511 CHRONIC PAIN OF BOTH SHOULDERS: ICD-10-CM

## 2025-06-10 DIAGNOSIS — G89.29 CHRONIC PAIN OF BOTH SHOULDERS: ICD-10-CM

## 2025-06-10 PROCEDURE — 3078F DIAST BP <80 MM HG: CPT | Mod: GC

## 2025-06-10 PROCEDURE — 3074F SYST BP LT 130 MM HG: CPT | Mod: GC

## 2025-06-10 PROCEDURE — 99214 OFFICE O/P EST MOD 30 MIN: CPT | Mod: GC

## 2025-06-10 RX ORDER — HYDROCODONE BITARTRATE AND ACETAMINOPHEN 10; 325 MG/1; MG/1
1 TABLET ORAL
Qty: 30 TABLET | Refills: 0 | Status: SHIPPED | OUTPATIENT
Start: 2025-06-19 | End: 2025-07-19

## 2025-06-10 RX ORDER — MORPHINE SULFATE 30 MG/1
30 TABLET, FILM COATED, EXTENDED RELEASE ORAL EVERY 12 HOURS
Qty: 60 TABLET | Refills: 0 | Status: SHIPPED | OUTPATIENT
Start: 2025-06-19 | End: 2025-07-19

## 2025-06-10 ASSESSMENT — ENCOUNTER SYMPTOMS
NAUSEA: 0
FEVER: 0
SHORTNESS OF BREATH: 0
BACK PAIN: 1
NECK PAIN: 1
VOMITING: 0

## 2025-06-10 ASSESSMENT — FIBROSIS 4 INDEX: FIB4 SCORE: 0.76

## 2025-06-10 NOTE — PROGRESS NOTES
Teaching Physician Attestation      Level of Participation    I have personally interviewed and examined the patient.  In addition, I discussed with the resident physician the patient's history, exam, assessment and plan in detail.  Topics listed in my addendum were the focus of the visit.  Healthcare maintenance was not addressed this visit unless listed as a topic in my addendum.  I agree with the plan as written along with the following additions/modifications:    Rheumatoid arthritis  - Patient has chronic pain in many areas in his body including his small joints on hands with a positive rheumatoid factor, he was previously followed by rheumatology and pain management, reports he was having side effects with a biologic treatment and thus discontinued.  He stopped seeing his rheumatologist and his pain management physicians because of some issues that came up with both of those physicians per patient.  He was trialed on methotrexate at last visit but noticed no change in his symptoms.  He is on MS Contin and also Norco, reports he was previously on a much higher dose, has some control of symptoms.  Denies chest pain.  States all of his chronic pain positions seem to be related to his rheumatoid arthritis.  UDS neg 4/2025    Plan  -Discontinue methotrexate as is not helping at current dose.  Check CBC, CMP, hep B and C serologies, QuantiFERON both for monitoring and also to help prep for further potential treatment once establishes with new rheumatologist, appreciate support.  Emphasized the critical importance of following up with a subspecialist to allow specialized optimization of his pain/disease  -PDMP reviewed.  Will refill morphine and Norco at current doses to bridge to new pain management, emphasized the importance of following up with pain management to establish care for further management, appreciate support.  Contact information given.     Return to clinic 1 month.  PCR

## 2025-06-10 NOTE — PROGRESS NOTES
History of Present Illness:   Christophe Leary is a 65 y.o. male who presents with follow-up for rheumatoid arthritis and chronic pain.  Patient states that he has been taking the methotrexate without feeling much improvement.  He also has not been taking it completely correctly and was confused with the instructions.  He mentions that there was 1 week where he took it 2 days in a row and there were times where he would miss doses as well.  His pain is continued to not be well-controlled.  He is taking MS Contin and Norco which helps take the edge off but does not completely resolve his pain.      Past Medical History[1]    Past Surgical History[2]    Family History   Problem Relation Age of Onset    Heart Disease Father        Social History     Socioeconomic History    Marital status: Single     Spouse name: Not on file    Number of children: Not on file    Years of education: Not on file    Highest education level: Not on file   Occupational History    Occupation:    Tobacco Use    Smoking status: Never    Smokeless tobacco: Never   Vaping Use    Vaping status: Never Used   Substance and Sexual Activity    Alcohol use: Not Currently     Comment: rarely    Drug use: No     Comment: METH AMPHETAMINE 20 YRS AGO    Sexual activity: Yes     Partners: Female   Other Topics Concern    Not on file   Social History Narrative    Not on file     Social Drivers of Health     Financial Resource Strain: Not on file   Food Insecurity: Not on file   Transportation Needs: Not on file   Physical Activity: Not on file   Stress: Not on file   Social Connections: Not on file   Intimate Partner Violence: Not on file   Housing Stability: Not on file       Current Medications[3]    Allergies[4]    Review of Systems:  Review of Systems   Constitutional:  Negative for fever.   Respiratory:  Negative for shortness of breath.    Cardiovascular:  Negative for chest pain.   Gastrointestinal:  Negative for nausea and  [facility]    Called wife Stephanie to get update on Pat as inquiring if DCCV needed.  She reports he passed away last Thur.   She wants to thank Fanny Toribio and team for wonderful care of Pat.      Orders and future appts cancelled.     Notification of  pt form sent to [facility].     [facility] CC removed from Care teams.         Sendy Fuller RN BSN   [facility]- [place], [place]  [facility] Care Coordinator  24, 2:07 PM    [phone]          "vomiting.   Musculoskeletal:  Positive for back pain, joint pain and neck pain.        Medications:     Current Outpatient Medications:     [START ON 6/19/2025] HYDROcodone/acetaminophen, 1 Tablet, Oral, QDAY PRN, Taking As Needed    [START ON 6/19/2025] morphine ER, 30 mg, Oral, Q12HRS, Taking    folic acid, 1 mg, Oral, DAILY, Taking Differently    omeprazole, 20 mg, Oral, DAILY, Taking    levothyroxine, 112 mcg, Oral, AM ES, Taking     Objective:  Vitals:   /71 (BP Location: Left arm, Patient Position: Sitting, BP Cuff Size: Adult)   Pulse (!) 53   Temp 36.6 °C (97.8 °F) (Temporal)   Ht 1.778 m (5' 10\")   Wt 67.6 kg (149 lb)   SpO2 97%  Body mass index is 21.38 kg/m².    Physical Exam  Constitutional:       General: He is not in acute distress.  HENT:      Head: Normocephalic and atraumatic.   Eyes:      Conjunctiva/sclera: Conjunctivae normal.   Cardiovascular:      Rate and Rhythm: Normal rate and regular rhythm.   Pulmonary:      Effort: No respiratory distress.      Breath sounds: Normal breath sounds.   Musculoskeletal:         General: Tenderness and deformity present.      Comments: Ulnar deviation of bilateral hands  Enlarged MCP joints     Neurological:      General: No focal deficit present.      Mental Status: He is oriented to person, place, and time.          Results:    Lab Results   Component Value Date/Time    CHOLSTRLTOT 158 08/15/2023 12:11 PM     (H) 08/15/2023 12:11 PM    HDL 39 (A) 08/15/2023 12:11 PM    TRIGLYCERIDE 94 08/15/2023 12:11 PM       Lab Results   Component Value Date/Time    SODIUM 140 11/03/2024 11:24 AM    POTASSIUM 4.1 11/03/2024 11:24 AM    CHLORIDE 101 11/03/2024 11:24 AM    CO2 26 11/03/2024 11:24 AM    GLUCOSE 106 (H) 11/03/2024 11:24 AM    BUN 11 11/03/2024 11:24 AM    CREATININE 0.97 11/03/2024 11:24 AM    CREATININE 1.0 10/30/2008 08:55 PM     Lab Results   Component Value Date/Time    ALKPHOSPHAT 65 11/03/2024 11:24 AM    ASTSGOT 16 11/03/2024 11:24 AM "    ALTSGPT 16 11/03/2024 11:24 AM    TBILIRUBIN 0.5 11/03/2024 11:24 AM        Lab Results   Component Value Date/Time    WBC 10.7 11/03/2024 11:24 AM    RBC 5.24 11/03/2024 11:24 AM    HEMOGLOBIN 15.0 11/03/2024 11:24 AM    HEMATOCRIT 46.3 11/03/2024 11:24 AM    MCV 88.4 11/03/2024 11:24 AM    MCH 28.6 11/03/2024 11:24 AM    MCHC 32.4 11/03/2024 11:24 AM    MPV 8.7 (L) 11/03/2024 11:24 AM    NEUTSPOLYS 67.80 11/03/2024 11:24 AM    LYMPHOCYTES 25.20 11/03/2024 11:24 AM    MONOCYTES 5.20 11/03/2024 11:24 AM    EOSINOPHILS 0.90 11/03/2024 11:24 AM    BASOPHILS 0.00 11/03/2024 11:24 AM    HYPOCHROMIA 1+ 03/02/2010 05:00 PM    ANISOCYTOSIS 1+ 11/01/2017 10:31 PM        Assessment and Plan:    #rheumatoid arthritis  Rheumatoid factor (5/26/21): 282  CRP (5/26/21): 3.13 -> 1.21 (1/29/25)  CCP (10/23/2023): 239  Patient with history of rheumatoid arthritis previously followed by Dr. Gomez (rheumatologist). He was on immunotherapy, but discontinued during COVID because he did not want to catch COVID or be immunosuppressed.  He has since been lost to follow-up.  Attempted to get him reestablished with rheumatology, however there have been issues with patient preference and insurance  Briefly initiated methotrexate for him.  However, he has not been taking it as prescribed and does not feel like there has been any benefit.  Given extensive side effect profile of methotrexate and no real improvement, will discontinue which patient is agreeable for  - Reiterated to obtain follow-up with rheumatology  -Check CBC, CMP, hep B and C serologies, and QuantiFERON in setting of methotrexate use     #Chronic pain  Patient reporting pain all over. Described as shooting pains in random spots all over his body.  At this visit, he mentions neck, arms, legs, shoulders.  Was following with pain management and taking MS Contin and Norco  Unfortunately, had a falling out with his previous pain management and needs to reestablish  We are  currently providing his MS Contin and Norco (daily MME of 70) until he can get reestablished.  Pain agreement on file  Has Narcan at home and understands how to use it  -Refill provided this month for his opioids.  Explained to him that we will be weaning next month  -Referral information provided for [facility]    **Chronic conditions not fully addressed     #Hypothyroidism  (1/2025): TSH 36 with free T4 0.50, restarted on Synthroid 112 mcg  -Continue Synthroid and repeat thyroid panel; reiterated to get done     #GERD  Mostly triggered by coffee  Taking omeprazole  - Advised to avoid triggering foods, eating smaller portions, and avoid eating near bedtime  -Will discuss weaning off omeprazole next visit     #Weight loss  On prior visit patient had 26 pound unintentional weight loss in the past 2 years.  Had a colonoscopy in 2018 that showed edematous polyp with recommendation of follow-up in 5 years.   Referral to GI for colonoscopy was sent, but patient wants to defer until he gets his pain resolved  He was able to get Cologuard done instead for the meantime  - Follow-up on Cologuard result     #Rectosigmoid colitis  On 11/2024, patient went to the ED due to bloody diarrhea and abdominal pain and had a CT scan that had findings consistent with rectosigmoid colitis.  He was recommended to be hospitalized but left AMA   Recommended GI follow-up but patient deferred due to wanting to get his pain resolved first  Symptoms resolved    Follow Up:  Return in about 3 months (around 9/10/2025).            [1]   Past Medical History:  Diagnosis Date    Fibromyalgia     GERD (gastroesophageal reflux disease)     Hypertension     medicated    Hypothyroidism     Rheumatoid arthritis(714.0)    [2]   Past Surgical History:  Procedure Laterality Date    KNEE ARTHROPLASTY TOTAL  2013    right    OTHER ORTHOPEDIC SURGERY      knees, elbow, wrist   [3]   Current Outpatient Medications   Medication Sig Dispense Refill     [START ON 6/19/2025] HYDROcodone/acetaminophen (NORCO)  MG Tab Take 1 Tablet by mouth 1 time a day as needed for Severe Pain for up to 30 days. 30 Tablet 0    [START ON 6/19/2025] morphine ER (MS CONTIN) 30 MG Tab CR tablet Take 1 Tablet by mouth every 12 hours for 30 days. 60 Tablet 0    folic acid (FOLVITE) 1 MG Tab Take 1 Tablet by mouth every day. 30 Tablet 0    omeprazole (PRILOSEC) 20 MG delayed-release capsule Take 1 Capsule by mouth every day. 30 Capsule 1    levothyroxine (SYNTHROID) 112 MCG Tab Take 1 Tablet by mouth every morning on an empty stomach. 90 Tablet 1     No current facility-administered medications for this visit.   [4] No Known Allergies

## 2025-06-10 NOTE — PATIENT INSTRUCTIONS
[place] [facility] [place]  [address], [address]  [place] [address]  Phone: [phone]    [facility]  [address]  [place] [address]  Phone: [phone]

## 2025-06-12 LAB — NONINV COLON CA DNA+OCC BLD SCRN STL QL: NEGATIVE

## 2025-06-13 ENCOUNTER — RESULTS FOLLOW-UP (OUTPATIENT)
Dept: INTERNAL MEDICINE | Facility: OTHER | Age: 65
End: 2025-06-13

## 2025-06-13 ENCOUNTER — TELEPHONE (OUTPATIENT)
Dept: INTERNAL MEDICINE | Facility: OTHER | Age: 65
End: 2025-06-13
Payer: MEDICAID

## 2025-06-13 NOTE — TELEPHONE ENCOUNTER
Patient LVM, would like a referral placed to [facility]. Would like call back with updates on whether the referral can be placed or not.

## 2025-06-16 DIAGNOSIS — M25.561 CHRONIC PAIN OF RIGHT KNEE: ICD-10-CM

## 2025-06-16 DIAGNOSIS — Z79.891 CHRONIC USE OF OPIATE FOR THERAPEUTIC PURPOSE: Primary | ICD-10-CM

## 2025-06-16 DIAGNOSIS — G89.29 CHRONIC PAIN OF RIGHT KNEE: ICD-10-CM

## 2025-06-16 DIAGNOSIS — G89.29 CHRONIC PAIN OF BOTH SHOULDERS: ICD-10-CM

## 2025-06-16 DIAGNOSIS — M25.511 CHRONIC PAIN OF BOTH SHOULDERS: ICD-10-CM

## 2025-06-16 DIAGNOSIS — M25.512 CHRONIC PAIN OF BOTH SHOULDERS: ICD-10-CM

## 2025-06-16 NOTE — TELEPHONE ENCOUNTER
Spoke with patient and informed him that referral was placed. I informed him to give it about a week or so before it gets approved.

## 2025-06-18 NOTE — Clinical Note
REFERRAL APPROVAL NOTICE         Sent on June 18, 2025                   Christophe Jeevan Leary  [address] [place] [address]                   Dear Mr. Leary,    After a careful review of the medical information and benefit coverage, [facility] has processed your referral. See below for additional details.    If applicable, you must be actively enrolled with your insurance for coverage of the authorized service. If you have any questions regarding your coverage, please contact your insurance directly.    REFERRAL INFORMATION   Referral #:  [id number]  Referred-To Provider    Referred-By Provider:  Physical Medicine & Rehabilitation Pain Medicine    Valerio Garcia D.O.   [facility]      [address] [address]  [phone] [address] [address]  [phone]    Referral Start Date:  06/16/2025  Referral End Date:   06/16/2026             SCHEDULING  If you do not already have an appointment, please call [phone] to make an appointment.     MORE INFORMATION  If you do not already have a [facility] account, sign up at: [email or web address]  You can access your medical information, make appointments, see lab results, billing information, and more.  If you have questions regarding this referral, please contact  the [facility] Referrals department at:             [phone]. Monday - Friday 8:00AM - 5:00PM.     Sincerely,    [facility]

## 2025-06-24 ENCOUNTER — TELEPHONE (OUTPATIENT)
Dept: INTERNAL MEDICINE | Facility: OTHER | Age: 65
End: 2025-06-24
Payer: MEDICAID

## 2025-06-24 NOTE — TELEPHONE ENCOUNTER
DOCUMENTATION OF PAR STATUS:    1. Name of Medication & Dose:     morphine ER (MS CONTIN) 30 MG Tab CR tablet        2. Name of Prescription Coverage Company & phone #: [employer]// [place] Medicaid    3. Date Prior Auth Submitted: 6/24/25    4. What information was given to obtain insurance decision? Notes    5. Prior Auth Status? Pending    6. Patient Notified: no

## 2025-06-26 NOTE — TELEPHONE ENCOUNTER
FINAL PRIOR AUTHORIZATION STATUS:    1.  Name of Medication & Dose:  morphine ER (MS CONTIN) 30 MG Tab CR tablet     2. Prior Auth Status: Approved through 6/24/26     3. Action Taken: Pharmacy Notified: yes Patient Notified: no

## 2025-07-03 ENCOUNTER — OFFICE VISIT (OUTPATIENT)
Dept: URGENT CARE | Facility: PHYSICIAN GROUP | Age: 65
End: 2025-07-03
Payer: MEDICAID

## 2025-07-03 VITALS
SYSTOLIC BLOOD PRESSURE: 126 MMHG | RESPIRATION RATE: 18 BRPM | BODY MASS INDEX: 20.19 KG/M2 | TEMPERATURE: 97.6 F | HEART RATE: 66 BPM | DIASTOLIC BLOOD PRESSURE: 76 MMHG | HEIGHT: 70 IN | OXYGEN SATURATION: 97 % | WEIGHT: 141 LBS

## 2025-07-03 DIAGNOSIS — S39.012A STRAIN OF LUMBAR REGION, INITIAL ENCOUNTER: Primary | ICD-10-CM

## 2025-07-03 PROCEDURE — 99213 OFFICE O/P EST LOW 20 MIN: CPT | Performed by: FAMILY MEDICINE

## 2025-07-03 PROCEDURE — 3074F SYST BP LT 130 MM HG: CPT | Performed by: FAMILY MEDICINE

## 2025-07-03 PROCEDURE — 3078F DIAST BP <80 MM HG: CPT | Performed by: FAMILY MEDICINE

## 2025-07-03 RX ORDER — KETOROLAC TROMETHAMINE 15 MG/ML
15 INJECTION, SOLUTION INTRAMUSCULAR; INTRAVENOUS ONCE
Status: COMPLETED | OUTPATIENT
Start: 2025-07-03 | End: 2025-07-03

## 2025-07-03 RX ORDER — PREDNISONE 20 MG/1
60 TABLET ORAL DAILY
Qty: 15 TABLET | Refills: 0 | Status: SHIPPED | OUTPATIENT
Start: 2025-07-03 | End: 2025-07-08

## 2025-07-03 RX ORDER — INDOMETHACIN 50 MG/1
50 CAPSULE ORAL 3 TIMES DAILY
Qty: 20 CAPSULE | Refills: 0 | Status: SHIPPED | OUTPATIENT
Start: 2025-07-03

## 2025-07-03 RX ORDER — CYCLOBENZAPRINE HCL 10 MG
10 TABLET ORAL 3 TIMES DAILY PRN
Qty: 30 TABLET | Refills: 0 | Status: SHIPPED | OUTPATIENT
Start: 2025-07-03

## 2025-07-03 RX ADMIN — KETOROLAC TROMETHAMINE 15 MG: 15 INJECTION, SOLUTION INTRAMUSCULAR; INTRAVENOUS at 13:01

## 2025-07-03 ASSESSMENT — FIBROSIS 4 INDEX: FIB4 SCORE: 0.76

## 2025-07-03 NOTE — PROGRESS NOTES
"    Back Pain  This is a new problem. The current episode started in the past 7 days, after moving some heavy objects at home. The problem occurs constantly. The problem is unchanged. The pain is present in the lumbar spine. The quality of the pain is described as aching. The pain does not radiate. The pain is moderate. The symptoms are aggravated by position. Pertinent negatives include no bladder incontinence, bowel incontinence, dysuria, fever, headaches, numbness or weakness. Treatments tried: motrin.  The treatment provided no relief.     Social History[1]    Family hx was reviewed - no pertinent past family hx      Past medical history was unremarkable and not pertinent to current issue    Review of Systems   Constitutional: Negative for fever.   Gastrointestinal: Negative for bowel incontinence.   Genitourinary: Negative for bladder incontinence, dysuria, urgency and frequency.   Musculoskeletal: Positive for back pain.   Neurological: Negative for weakness, numbness and headaches.   All other systems reviewed and are negative.         Objective:     /76   Pulse 66   Temp 36.4 °C (97.6 °F) (Temporal)   Resp 18   Ht 1.778 m (5' 10\")   Wt 64 kg (141 lb)   SpO2 97%     Physical Exam   Constitutional: pt is oriented to person, place, and time. Pt appears well-developed and well-nourished. No distress.   HENT:   Head: Normocephalic and atraumatic.   Eyes: EOM are normal. Pupils are equal, round, and reactive to light. No scleral icterus.   Neck: Normal range of motion. Neck supple. No thyromegaly present.   Cardiovascular: Normal rate, regular rhythm and normal heart sounds.    Pulmonary/Chest: Effort normal and breath sounds normal. No respiratory distress.  no wheezes.   no rales.  no tenderness.   Musculoskeletal: pt exhibits no edema.        Right knee: Normal.        Left knee: Normal.               Lumbar back: pt exhibits decreased range of motion, spasm and tenderness . Pt exhibits no bony " tenderness, no swelling, no edema, no deformity  .   Neurological: patient is alert and oriented to person, place, and time. Patient has normal reflexes. No cranial nerve deficit. Patient exhibits normal muscle tone.   Reflex Scores:       Patellar reflexes are 2+ on the right side and 2+ on the left side.  STRAIGHT LEG RAISE, SHIRAZ NEGATIVE BILAT  Skin: Skin is warm and dry. No rash noted. No erythema.   Psychiatric: patient has a normal mood and affect.  behavior is normal.   Nursing note and vitals reviewed.              Assessment/Plan:       1. Strain of lumbar region, initial encounter (Primary)     - ketorolac (Toradol) 15 MG/ML injection 15 mg  - predniSONE (DELTASONE) 20 MG Tab; Take 3 Tablets by mouth every day for 5 days.  Dispense: 15 Tablet; Refill: 0  - cyclobenzaprine (FLEXERIL) 10 MG Tab; Take 1 Tablet by mouth 3 times a day as needed for Moderate Pain.  Dispense: 30 Tablet; Refill: 0  - indomethacin (INDOCIN) 50 MG Cap; Take 1 Capsule by mouth 3 times a day.  Dispense: 20 Capsule; Refill: 0       Differential diagnosis, natural history, supportive care, and indications for immediate follow-up discussed. All questions answered. Patient agrees with the plan of care.     Follow-up as needed if symptoms worsen or fail to improve to PCP, Urgent care or Emergency Room.     I have personally reviewed prior external notes and test results pertinent to today's visit.  I have independently reviewed and interpreted all diagnostics ordered during this urgent care acute visit.            [1]   Social History  Tobacco Use    Smoking status: Never    Smokeless tobacco: Never   Vaping Use    Vaping status: Never Used   Substance Use Topics    Alcohol use: Not Currently     Comment: rarely    Drug use: No     Comment: METH AMPHETAMINE 20 YRS AGO

## 2025-07-18 DIAGNOSIS — M25.512 CHRONIC PAIN OF BOTH SHOULDERS: ICD-10-CM

## 2025-07-18 DIAGNOSIS — M25.511 CHRONIC PAIN OF BOTH SHOULDERS: ICD-10-CM

## 2025-07-18 DIAGNOSIS — Z79.891 CHRONIC USE OF OPIATE FOR THERAPEUTIC PURPOSE: ICD-10-CM

## 2025-07-18 DIAGNOSIS — G89.29 CHRONIC PAIN OF BOTH SHOULDERS: ICD-10-CM

## 2025-07-18 NOTE — TELEPHONE ENCOUNTER
Received request via: Patient    Was the patient seen in the last year in this department? Yes    Does the patient have an active prescription (recently filled or refills available) for medication(s) requested? No    [facility] - [address]    Does the patient have FDC Plus and need 100-day supply? (This applies to ALL medications) Patient does not have SCP    Patient requested the refill request be sent marked urgent because he will be out of medication tomorrow. He has a follow up scheduled for 9/2/25

## 2025-07-21 RX ORDER — MORPHINE SULFATE 30 MG/1
30 TABLET, FILM COATED, EXTENDED RELEASE ORAL EVERY 12 HOURS
Qty: 60 TABLET | Refills: 0 | Status: SHIPPED | OUTPATIENT
Start: 2025-07-21 | End: 2025-08-20

## 2025-07-21 RX ORDER — HYDROCODONE BITARTRATE AND ACETAMINOPHEN 10; 325 MG/1; MG/1
1 TABLET ORAL
Qty: 30 TABLET | Refills: 0 | Status: SHIPPED | OUTPATIENT
Start: 2025-07-21 | End: 2025-08-20

## 2025-07-31 ENCOUNTER — APPOINTMENT (OUTPATIENT)
Dept: RADIOLOGY | Facility: MEDICAL CENTER | Age: 65
End: 2025-07-31
Attending: EMERGENCY MEDICINE
Payer: MEDICAID

## 2025-07-31 ENCOUNTER — HOSPITAL ENCOUNTER (EMERGENCY)
Facility: MEDICAL CENTER | Age: 65
End: 2025-07-31
Attending: EMERGENCY MEDICINE
Payer: MEDICAID

## 2025-07-31 VITALS
HEART RATE: 95 BPM | WEIGHT: 150 LBS | RESPIRATION RATE: 16 BRPM | DIASTOLIC BLOOD PRESSURE: 98 MMHG | OXYGEN SATURATION: 97 % | TEMPERATURE: 98.8 F | BODY MASS INDEX: 21.47 KG/M2 | SYSTOLIC BLOOD PRESSURE: 155 MMHG | HEIGHT: 70 IN

## 2025-07-31 DIAGNOSIS — N40.0 PROSTATIC HYPERTROPHY: Primary | ICD-10-CM

## 2025-07-31 DIAGNOSIS — S30.0XXA HEMATOMA OF LEFT BUTTOCK: ICD-10-CM

## 2025-07-31 DIAGNOSIS — N32.89 IRRITABLE BLADDER: ICD-10-CM

## 2025-07-31 LAB
ALBUMIN SERPL BCP-MCNC: 3.9 G/DL (ref 3.2–4.9)
ALBUMIN/GLOB SERPL: 1.1 G/DL
ALP SERPL-CCNC: 82 U/L (ref 30–99)
ALT SERPL-CCNC: 13 U/L (ref 2–50)
ANION GAP SERPL CALC-SCNC: 15 MMOL/L (ref 7–16)
APPEARANCE UR: CLEAR
AST SERPL-CCNC: 15 U/L (ref 12–45)
BASOPHILS # BLD AUTO: 0.5 % (ref 0–1.8)
BASOPHILS # BLD: 0.04 K/UL (ref 0–0.12)
BILIRUB SERPL-MCNC: 0.6 MG/DL (ref 0.1–1.5)
BILIRUB UR QL STRIP.AUTO: NEGATIVE
BUN SERPL-MCNC: 9 MG/DL (ref 8–22)
CALCIUM ALBUM COR SERPL-MCNC: 9.4 MG/DL (ref 8.5–10.5)
CALCIUM SERPL-MCNC: 9.3 MG/DL (ref 8.5–10.5)
CHLORIDE SERPL-SCNC: 97 MMOL/L (ref 96–112)
CO2 SERPL-SCNC: 23 MMOL/L (ref 20–33)
COLOR UR: YELLOW
CREAT SERPL-MCNC: 0.97 MG/DL (ref 0.5–1.4)
EKG IMPRESSION: NORMAL
EOSINOPHIL # BLD AUTO: 0.15 K/UL (ref 0–0.51)
EOSINOPHIL NFR BLD: 1.7 % (ref 0–6.9)
ERYTHROCYTE [DISTWIDTH] IN BLOOD BY AUTOMATED COUNT: 40.9 FL (ref 35.9–50)
GFR SERPLBLD CREATININE-BSD FMLA CKD-EPI: 88 ML/MIN/1.73 M 2
GLOBULIN SER CALC-MCNC: 3.4 G/DL (ref 1.9–3.5)
GLUCOSE SERPL-MCNC: 96 MG/DL (ref 65–99)
GLUCOSE UR STRIP.AUTO-MCNC: NEGATIVE MG/DL
HCT VFR BLD AUTO: 35.5 % (ref 42–52)
HGB BLD-MCNC: 12.4 G/DL (ref 14–18)
IMM GRANULOCYTES # BLD AUTO: 0.06 K/UL (ref 0–0.11)
IMM GRANULOCYTES NFR BLD AUTO: 0.7 % (ref 0–0.9)
KETONES UR STRIP.AUTO-MCNC: 15 MG/DL
LEUKOCYTE ESTERASE UR QL STRIP.AUTO: NEGATIVE
LYMPHOCYTES # BLD AUTO: 1.03 K/UL (ref 1–4.8)
LYMPHOCYTES NFR BLD: 11.7 % (ref 22–41)
MCH RBC QN AUTO: 29.2 PG (ref 27–33)
MCHC RBC AUTO-ENTMCNC: 34.9 G/DL (ref 32.3–36.5)
MCV RBC AUTO: 83.5 FL (ref 81.4–97.8)
MICRO URNS: ABNORMAL
MONOCYTES # BLD AUTO: 0.96 K/UL (ref 0–0.85)
MONOCYTES NFR BLD AUTO: 10.9 % (ref 0–13.4)
NEUTROPHILS # BLD AUTO: 6.57 K/UL (ref 1.82–7.42)
NEUTROPHILS NFR BLD: 74.5 % (ref 44–72)
NITRITE UR QL STRIP.AUTO: NEGATIVE
NRBC # BLD AUTO: 0 K/UL
NRBC BLD-RTO: 0 /100 WBC (ref 0–0.2)
NT-PROBNP SERPL IA-MCNC: 274 PG/ML (ref 0–125)
PH UR STRIP.AUTO: 8.5 [PH] (ref 5–8)
PLATELET # BLD AUTO: 420 K/UL (ref 164–446)
PMV BLD AUTO: 8.3 FL (ref 9–12.9)
POTASSIUM SERPL-SCNC: 3.7 MMOL/L (ref 3.6–5.5)
PROT SERPL-MCNC: 7.3 G/DL (ref 6–8.2)
PROT UR QL STRIP: NEGATIVE MG/DL
RBC # BLD AUTO: 4.25 M/UL (ref 4.7–6.1)
RBC UR QL AUTO: NEGATIVE
SODIUM SERPL-SCNC: 135 MMOL/L (ref 135–145)
SP GR UR STRIP.AUTO: 1.02
T PALLIDUM AB SER QL IA: NORMAL
TROPONIN T SERPL-MCNC: 9 NG/L (ref 6–19)
UROBILINOGEN UR STRIP.AUTO-MCNC: 1 EU/DL
WBC # BLD AUTO: 8.8 K/UL (ref 4.8–10.8)

## 2025-07-31 PROCEDURE — 96374 THER/PROPH/DIAG INJ IV PUSH: CPT | Mod: XU

## 2025-07-31 PROCEDURE — 700111 HCHG RX REV CODE 636 W/ 250 OVERRIDE (IP): Mod: JZ,UD | Performed by: EMERGENCY MEDICINE

## 2025-07-31 PROCEDURE — 85025 COMPLETE CBC W/AUTO DIFF WBC: CPT

## 2025-07-31 PROCEDURE — 80053 COMPREHEN METABOLIC PANEL: CPT

## 2025-07-31 PROCEDURE — 72131 CT LUMBAR SPINE W/O DYE: CPT

## 2025-07-31 PROCEDURE — 84484 ASSAY OF TROPONIN QUANT: CPT

## 2025-07-31 PROCEDURE — 83880 ASSAY OF NATRIURETIC PEPTIDE: CPT

## 2025-07-31 PROCEDURE — 81003 URINALYSIS AUTO W/O SCOPE: CPT

## 2025-07-31 PROCEDURE — 93005 ELECTROCARDIOGRAM TRACING: CPT | Mod: TC | Performed by: EMERGENCY MEDICINE

## 2025-07-31 PROCEDURE — 71045 X-RAY EXAM CHEST 1 VIEW: CPT

## 2025-07-31 PROCEDURE — A9270 NON-COVERED ITEM OR SERVICE: HCPCS | Mod: UD | Performed by: EMERGENCY MEDICINE

## 2025-07-31 PROCEDURE — 36415 COLL VENOUS BLD VENIPUNCTURE: CPT

## 2025-07-31 PROCEDURE — 71260 CT THORAX DX C+: CPT

## 2025-07-31 PROCEDURE — 99285 EMERGENCY DEPT VISIT HI MDM: CPT

## 2025-07-31 PROCEDURE — 93005 ELECTROCARDIOGRAM TRACING: CPT | Mod: TC

## 2025-07-31 PROCEDURE — 86780 TREPONEMA PALLIDUM: CPT

## 2025-07-31 PROCEDURE — 700117 HCHG RX CONTRAST REV CODE 255: Mod: UD | Performed by: EMERGENCY MEDICINE

## 2025-07-31 PROCEDURE — 700102 HCHG RX REV CODE 250 W/ 637 OVERRIDE(OP): Mod: UD | Performed by: EMERGENCY MEDICINE

## 2025-07-31 RX ORDER — CYCLOBENZAPRINE HCL 10 MG
10 TABLET ORAL ONCE
Status: COMPLETED | OUTPATIENT
Start: 2025-07-31 | End: 2025-07-31

## 2025-07-31 RX ORDER — TAMSULOSIN HYDROCHLORIDE 0.4 MG/1
0.4 CAPSULE ORAL DAILY
Qty: 30 CAPSULE | Refills: 1 | Status: SHIPPED | OUTPATIENT
Start: 2025-07-31 | End: 2025-08-26

## 2025-07-31 RX ORDER — MORPHINE SULFATE 4 MG/ML
6 INJECTION INTRAVENOUS ONCE
Status: COMPLETED | OUTPATIENT
Start: 2025-07-31 | End: 2025-07-31

## 2025-07-31 RX ADMIN — MORPHINE SULFATE 6 MG: 4 INJECTION, SOLUTION INTRAMUSCULAR; INTRAVENOUS at 17:58

## 2025-07-31 RX ADMIN — IOHEXOL 100 ML: 350 INJECTION, SOLUTION INTRAVENOUS at 18:31

## 2025-07-31 RX ADMIN — CYCLOBENZAPRINE 10 MG: 10 TABLET, FILM COATED ORAL at 18:06

## 2025-07-31 ASSESSMENT — FIBROSIS 4 INDEX: FIB4 SCORE: 0.76

## 2025-08-07 ENCOUNTER — HOSPITAL ENCOUNTER (INPATIENT)
Facility: MEDICAL CENTER | Age: 65
LOS: 15 days | DRG: 603 | End: 2025-08-22
Attending: STUDENT IN AN ORGANIZED HEALTH CARE EDUCATION/TRAINING PROGRAM | Admitting: STUDENT IN AN ORGANIZED HEALTH CARE EDUCATION/TRAINING PROGRAM
Payer: MEDICAID

## 2025-08-07 ENCOUNTER — APPOINTMENT (OUTPATIENT)
Dept: RADIOLOGY | Facility: MEDICAL CENTER | Age: 65
DRG: 603 | End: 2025-08-07
Attending: STUDENT IN AN ORGANIZED HEALTH CARE EDUCATION/TRAINING PROGRAM
Payer: MEDICAID

## 2025-08-07 ENCOUNTER — ANESTHESIA EVENT (OUTPATIENT)
Dept: SURGERY | Facility: MEDICAL CENTER | Age: 65
DRG: 603 | End: 2025-08-07
Payer: MEDICAID

## 2025-08-07 ENCOUNTER — ANESTHESIA (OUTPATIENT)
Dept: SURGERY | Facility: MEDICAL CENTER | Age: 65
DRG: 603 | End: 2025-08-07
Payer: MEDICAID

## 2025-08-07 DIAGNOSIS — Z91.89 AT RISK FOR CONSTIPATION: ICD-10-CM

## 2025-08-07 DIAGNOSIS — L02.31 ABSCESS, GLUTEAL, LEFT: Primary | ICD-10-CM

## 2025-08-07 PROBLEM — D72.829 LEUKOCYTOSIS: Status: ACTIVE | Noted: 2025-08-07

## 2025-08-07 PROBLEM — E87.1 HYPONATREMIA: Status: ACTIVE | Noted: 2025-08-07

## 2025-08-07 PROBLEM — I71.40 AAA (ABDOMINAL AORTIC ANEURYSM) (HCC): Status: ACTIVE | Noted: 2025-08-07

## 2025-08-07 LAB
ALBUMIN SERPL BCP-MCNC: 3.2 G/DL (ref 3.2–4.9)
ALBUMIN/GLOB SERPL: 0.8 G/DL
ALP SERPL-CCNC: 159 U/L (ref 30–99)
ALT SERPL-CCNC: 48 U/L (ref 2–50)
ANION GAP SERPL CALC-SCNC: 15 MMOL/L (ref 7–16)
AST SERPL-CCNC: 63 U/L (ref 12–45)
BASOPHILS # BLD AUTO: 0 % (ref 0–1.8)
BASOPHILS # BLD: 0 K/UL (ref 0–0.12)
BILIRUB SERPL-MCNC: 0.5 MG/DL (ref 0.1–1.5)
BUN SERPL-MCNC: 13 MG/DL (ref 8–22)
CALCIUM ALBUM COR SERPL-MCNC: 9.4 MG/DL (ref 8.5–10.5)
CALCIUM SERPL-MCNC: 8.8 MG/DL (ref 8.5–10.5)
CHLORIDE SERPL-SCNC: 93 MMOL/L (ref 96–112)
CO2 SERPL-SCNC: 23 MMOL/L (ref 20–33)
CREAT SERPL-MCNC: 1.07 MG/DL (ref 0.5–1.4)
DOHLE BOD BLD QL SMEAR: NORMAL
EOSINOPHIL # BLD AUTO: 0 K/UL (ref 0–0.51)
EOSINOPHIL NFR BLD: 0 % (ref 0–6.9)
ERYTHROCYTE [DISTWIDTH] IN BLOOD BY AUTOMATED COUNT: 46.3 FL (ref 35.9–50)
GFR SERPLBLD CREATININE-BSD FMLA CKD-EPI: 78 ML/MIN/1.73 M 2
GLOBULIN SER CALC-MCNC: 3.8 G/DL (ref 1.9–3.5)
GLUCOSE SERPL-MCNC: 122 MG/DL (ref 65–99)
HCT VFR BLD AUTO: 31 % (ref 42–52)
HGB BLD-MCNC: 10.3 G/DL (ref 14–18)
LACTATE SERPL-SCNC: 1.2 MMOL/L (ref 0.5–2)
LYMPHOCYTES # BLD AUTO: 0.69 K/UL (ref 1–4.8)
LYMPHOCYTES NFR BLD: 3.4 % (ref 22–41)
MANUAL DIFF BLD: NORMAL
MCH RBC QN AUTO: 28.5 PG (ref 27–33)
MCHC RBC AUTO-ENTMCNC: 33.2 G/DL (ref 32.3–36.5)
MCV RBC AUTO: 85.9 FL (ref 81.4–97.8)
MONOCYTES # BLD AUTO: 1.2 K/UL (ref 0–0.85)
MONOCYTES NFR BLD AUTO: 6 % (ref 0–13.4)
MORPHOLOGY BLD-IMP: NORMAL
NEUTROPHILS # BLD AUTO: 18.48 K/UL (ref 1.82–7.42)
NEUTROPHILS NFR BLD: 89.7 % (ref 44–72)
NEUTS BAND NFR BLD MANUAL: 0.9 % (ref 0–10)
NRBC # BLD AUTO: 0 K/UL
NRBC BLD-RTO: 0 /100 WBC (ref 0–0.2)
PLATELET # BLD AUTO: 498 K/UL (ref 164–446)
PLATELET BLD QL SMEAR: NORMAL
PMV BLD AUTO: 8.2 FL (ref 9–12.9)
POTASSIUM SERPL-SCNC: 3.8 MMOL/L (ref 3.6–5.5)
PROT SERPL-MCNC: 7 G/DL (ref 6–8.2)
RBC # BLD AUTO: 3.61 M/UL (ref 4.7–6.1)
RBC BLD AUTO: PRESENT
SODIUM SERPL-SCNC: 131 MMOL/L (ref 135–145)
TOXIC GRANULES BLD QL SMEAR: NORMAL
WBC # BLD AUTO: 20.4 K/UL (ref 4.8–10.8)

## 2025-08-07 PROCEDURE — 85007 BL SMEAR W/DIFF WBC COUNT: CPT

## 2025-08-07 PROCEDURE — 700105 HCHG RX REV CODE 258: Performed by: STUDENT IN AN ORGANIZED HEALTH CARE EDUCATION/TRAINING PROGRAM

## 2025-08-07 PROCEDURE — 96375 TX/PRO/DX INJ NEW DRUG ADDON: CPT

## 2025-08-07 PROCEDURE — 770006 HCHG ROOM/CARE - MED/SURG/GYN SEMI*

## 2025-08-07 PROCEDURE — 700111 HCHG RX REV CODE 636 W/ 250 OVERRIDE (IP): Performed by: STUDENT IN AN ORGANIZED HEALTH CARE EDUCATION/TRAINING PROGRAM

## 2025-08-07 PROCEDURE — 99223 1ST HOSP IP/OBS HIGH 75: CPT | Performed by: STUDENT IN AN ORGANIZED HEALTH CARE EDUCATION/TRAINING PROGRAM

## 2025-08-07 PROCEDURE — 96365 THER/PROPH/DIAG IV INF INIT: CPT

## 2025-08-07 PROCEDURE — 80053 COMPREHEN METABOLIC PANEL: CPT

## 2025-08-07 PROCEDURE — 74177 CT ABD & PELVIS W/CONTRAST: CPT

## 2025-08-07 PROCEDURE — 83605 ASSAY OF LACTIC ACID: CPT

## 2025-08-07 PROCEDURE — 36415 COLL VENOUS BLD VENIPUNCTURE: CPT

## 2025-08-07 PROCEDURE — 87077 CULTURE AEROBIC IDENTIFY: CPT

## 2025-08-07 PROCEDURE — 87150 DNA/RNA AMPLIFIED PROBE: CPT | Mod: 91

## 2025-08-07 PROCEDURE — 99291 CRITICAL CARE FIRST HOUR: CPT

## 2025-08-07 PROCEDURE — 85027 COMPLETE CBC AUTOMATED: CPT

## 2025-08-07 PROCEDURE — 700117 HCHG RX CONTRAST REV CODE 255: Mod: UD | Performed by: STUDENT IN AN ORGANIZED HEALTH CARE EDUCATION/TRAINING PROGRAM

## 2025-08-07 PROCEDURE — 87040 BLOOD CULTURE FOR BACTERIA: CPT

## 2025-08-07 RX ORDER — MORPHINE SULFATE 4 MG/ML
4 INJECTION INTRAVENOUS EVERY 4 HOURS PRN
Status: DISCONTINUED | OUTPATIENT
Start: 2025-08-07 | End: 2025-08-08

## 2025-08-07 RX ORDER — PROMETHAZINE HYDROCHLORIDE 25 MG/1
12.5-25 SUPPOSITORY RECTAL EVERY 4 HOURS PRN
Status: DISCONTINUED | OUTPATIENT
Start: 2025-08-07 | End: 2025-08-22 | Stop reason: HOSPADM

## 2025-08-07 RX ORDER — ONDANSETRON 2 MG/ML
4 INJECTION INTRAMUSCULAR; INTRAVENOUS EVERY 4 HOURS PRN
Status: DISCONTINUED | OUTPATIENT
Start: 2025-08-07 | End: 2025-08-22 | Stop reason: HOSPADM

## 2025-08-07 RX ORDER — OMEPRAZOLE 20 MG/1
20 CAPSULE, DELAYED RELEASE ORAL DAILY
Status: DISCONTINUED | OUTPATIENT
Start: 2025-08-08 | End: 2025-08-07

## 2025-08-07 RX ORDER — ACETAMINOPHEN 325 MG/1
650 TABLET ORAL EVERY 6 HOURS PRN
Status: DISCONTINUED | OUTPATIENT
Start: 2025-08-07 | End: 2025-08-13

## 2025-08-07 RX ORDER — HYDRALAZINE HYDROCHLORIDE 20 MG/ML
10 INJECTION INTRAMUSCULAR; INTRAVENOUS EVERY 4 HOURS PRN
Status: DISCONTINUED | OUTPATIENT
Start: 2025-08-07 | End: 2025-08-22 | Stop reason: HOSPADM

## 2025-08-07 RX ORDER — ONDANSETRON 4 MG/1
4 TABLET, ORALLY DISINTEGRATING ORAL EVERY 4 HOURS PRN
Status: DISCONTINUED | OUTPATIENT
Start: 2025-08-07 | End: 2025-08-22 | Stop reason: HOSPADM

## 2025-08-07 RX ORDER — PROCHLORPERAZINE EDISYLATE 5 MG/ML
5-10 INJECTION INTRAMUSCULAR; INTRAVENOUS EVERY 4 HOURS PRN
Status: DISCONTINUED | OUTPATIENT
Start: 2025-08-07 | End: 2025-08-22 | Stop reason: HOSPADM

## 2025-08-07 RX ORDER — SODIUM CHLORIDE 9 MG/ML
INJECTION, SOLUTION INTRAVENOUS CONTINUOUS
Status: ACTIVE | OUTPATIENT
Start: 2025-08-08 | End: 2025-08-08

## 2025-08-07 RX ORDER — HYDROCODONE BITARTRATE AND ACETAMINOPHEN 10; 325 MG/1; MG/1
1 TABLET ORAL
Refills: 0 | Status: DISCONTINUED | OUTPATIENT
Start: 2025-08-07 | End: 2025-08-09

## 2025-08-07 RX ORDER — CYCLOBENZAPRINE HCL 10 MG
10 TABLET ORAL 3 TIMES DAILY PRN
Status: DISCONTINUED | OUTPATIENT
Start: 2025-08-07 | End: 2025-08-22 | Stop reason: HOSPADM

## 2025-08-07 RX ORDER — MORPHINE SULFATE 4 MG/ML
4 INJECTION INTRAVENOUS ONCE
Status: COMPLETED | OUTPATIENT
Start: 2025-08-07 | End: 2025-08-07

## 2025-08-07 RX ORDER — TAMSULOSIN HYDROCHLORIDE 0.4 MG/1
0.4 CAPSULE ORAL DAILY
Status: DISCONTINUED | OUTPATIENT
Start: 2025-08-08 | End: 2025-08-22 | Stop reason: HOSPADM

## 2025-08-07 RX ORDER — SODIUM CHLORIDE, SODIUM LACTATE, POTASSIUM CHLORIDE, AND CALCIUM CHLORIDE .6; .31; .03; .02 G/100ML; G/100ML; G/100ML; G/100ML
1000 INJECTION, SOLUTION INTRAVENOUS ONCE
Status: COMPLETED | OUTPATIENT
Start: 2025-08-08 | End: 2025-08-08

## 2025-08-07 RX ORDER — PROMETHAZINE HYDROCHLORIDE 25 MG/1
12.5-25 TABLET ORAL EVERY 4 HOURS PRN
Status: DISCONTINUED | OUTPATIENT
Start: 2025-08-07 | End: 2025-08-22 | Stop reason: HOSPADM

## 2025-08-07 RX ADMIN — IOHEXOL 90 ML: 350 INJECTION, SOLUTION INTRAVENOUS at 22:39

## 2025-08-07 RX ADMIN — MORPHINE SULFATE 4 MG: 4 INJECTION, SOLUTION INTRAMUSCULAR; INTRAVENOUS at 22:22

## 2025-08-07 RX ADMIN — PIPERACILLIN AND TAZOBACTAM 3.38 G: 3; .375 INJECTION, POWDER, FOR SOLUTION INTRAVENOUS at 23:45

## 2025-08-07 ASSESSMENT — ENCOUNTER SYMPTOMS
ABDOMINAL PAIN: 0
EYE PAIN: 0
ORTHOPNEA: 0
DOUBLE VISION: 0
HEADACHES: 0
EYE DISCHARGE: 0
SPUTUM PRODUCTION: 0
PALPITATIONS: 0
CHILLS: 0
NECK PAIN: 1
BACK PAIN: 1
DIARRHEA: 0
FEVER: 0
HEMOPTYSIS: 0
PHOTOPHOBIA: 0
VOMITING: 0
SHORTNESS OF BREATH: 0
DIZZINESS: 0
NAUSEA: 0

## 2025-08-07 ASSESSMENT — FIBROSIS 4 INDEX: FIB4 SCORE: .6240377207533827623

## 2025-08-08 LAB
ERYTHROCYTE [DISTWIDTH] IN BLOOD BY AUTOMATED COUNT: 46.7 FL (ref 35.9–50)
GRAM STN SPEC: NORMAL
GRAM STN SPEC: NORMAL
HCT VFR BLD AUTO: 31.5 % (ref 42–52)
HGB BLD-MCNC: 10.4 G/DL (ref 14–18)
MCH RBC QN AUTO: 28.1 PG (ref 27–33)
MCHC RBC AUTO-ENTMCNC: 33 G/DL (ref 32.3–36.5)
MCV RBC AUTO: 85.1 FL (ref 81.4–97.8)
PLATELET # BLD AUTO: 557 K/UL (ref 164–446)
PMV BLD AUTO: 8.1 FL (ref 9–12.9)
RBC # BLD AUTO: 3.7 M/UL (ref 4.7–6.1)
SIGNIFICANT IND 70042: NORMAL
SIGNIFICANT IND 70042: NORMAL
SITE SITE: NORMAL
SITE SITE: NORMAL
SOURCE SOURCE: NORMAL
SOURCE SOURCE: NORMAL
WBC # BLD AUTO: 21 K/UL (ref 4.8–10.8)

## 2025-08-08 PROCEDURE — 0J990ZZ DRAINAGE OF BUTTOCK SUBCUTANEOUS TISSUE AND FASCIA, OPEN APPROACH: ICD-10-PCS | Performed by: SURGERY

## 2025-08-08 PROCEDURE — 770006 HCHG ROOM/CARE - MED/SURG/GYN SEMI*

## 2025-08-08 PROCEDURE — 160192 HCHG ANESTHESIA COMPLEX: Performed by: SURGERY

## 2025-08-08 PROCEDURE — 700101 HCHG RX REV CODE 250: Performed by: STUDENT IN AN ORGANIZED HEALTH CARE EDUCATION/TRAINING PROGRAM

## 2025-08-08 PROCEDURE — 700111 HCHG RX REV CODE 636 W/ 250 OVERRIDE (IP): Performed by: STUDENT IN AN ORGANIZED HEALTH CARE EDUCATION/TRAINING PROGRAM

## 2025-08-08 PROCEDURE — 87186 SC STD MICRODIL/AGAR DIL: CPT

## 2025-08-08 PROCEDURE — 87147 CULTURE TYPE IMMUNOLOGIC: CPT

## 2025-08-08 PROCEDURE — 160002 HCHG RECOVERY MINUTES (STAT): Performed by: SURGERY

## 2025-08-08 PROCEDURE — 700111 HCHG RX REV CODE 636 W/ 250 OVERRIDE (IP): Mod: JZ

## 2025-08-08 PROCEDURE — 700102 HCHG RX REV CODE 250 W/ 637 OVERRIDE(OP): Performed by: STUDENT IN AN ORGANIZED HEALTH CARE EDUCATION/TRAINING PROGRAM

## 2025-08-08 PROCEDURE — 99233 SBSQ HOSP IP/OBS HIGH 50: CPT | Performed by: INTERNAL MEDICINE

## 2025-08-08 PROCEDURE — 700105 HCHG RX REV CODE 258: Performed by: STUDENT IN AN ORGANIZED HEALTH CARE EDUCATION/TRAINING PROGRAM

## 2025-08-08 PROCEDURE — 87641 MR-STAPH DNA AMP PROBE: CPT

## 2025-08-08 PROCEDURE — 87077 CULTURE AEROBIC IDENTIFY: CPT | Mod: 91

## 2025-08-08 PROCEDURE — 160027 HCHG SURGERY MINUTES - 1ST 30 MINS LEVEL 2: Performed by: SURGERY

## 2025-08-08 PROCEDURE — 160048 HCHG OR STATISTICAL LEVEL 1-5: Performed by: SURGERY

## 2025-08-08 PROCEDURE — 85027 COMPLETE CBC AUTOMATED: CPT

## 2025-08-08 PROCEDURE — 87070 CULTURE OTHR SPECIMN AEROBIC: CPT

## 2025-08-08 PROCEDURE — 87205 SMEAR GRAM STAIN: CPT

## 2025-08-08 PROCEDURE — 700105 HCHG RX REV CODE 258: Performed by: SURGERY

## 2025-08-08 PROCEDURE — 160038 HCHG SURGERY MINUTES - EA ADDL 1 MIN LEVEL 2: Performed by: SURGERY

## 2025-08-08 PROCEDURE — 700101 HCHG RX REV CODE 250: Performed by: SURGERY

## 2025-08-08 PROCEDURE — 87075 CULTR BACTERIA EXCEPT BLOOD: CPT | Mod: 91

## 2025-08-08 PROCEDURE — 700111 HCHG RX REV CODE 636 W/ 250 OVERRIDE (IP): Performed by: INTERNAL MEDICINE

## 2025-08-08 PROCEDURE — 36415 COLL VENOUS BLD VENIPUNCTURE: CPT

## 2025-08-08 PROCEDURE — 160193 HCHG PACU STANDARD - 1ST 60 MINS: Performed by: SURGERY

## 2025-08-08 PROCEDURE — 700105 HCHG RX REV CODE 258: Performed by: INTERNAL MEDICINE

## 2025-08-08 PROCEDURE — A9270 NON-COVERED ITEM OR SERVICE: HCPCS | Performed by: STUDENT IN AN ORGANIZED HEALTH CARE EDUCATION/TRAINING PROGRAM

## 2025-08-08 PROCEDURE — 700111 HCHG RX REV CODE 636 W/ 250 OVERRIDE (IP): Mod: JZ | Performed by: STUDENT IN AN ORGANIZED HEALTH CARE EDUCATION/TRAINING PROGRAM

## 2025-08-08 PROCEDURE — 160015 HCHG STAT PREOP MINUTES: Performed by: SURGERY

## 2025-08-08 RX ORDER — HYDROMORPHONE HYDROCHLORIDE 1 MG/ML
0.2 INJECTION, SOLUTION INTRAMUSCULAR; INTRAVENOUS; SUBCUTANEOUS
Status: DISCONTINUED | OUTPATIENT
Start: 2025-08-08 | End: 2025-08-08 | Stop reason: HOSPADM

## 2025-08-08 RX ORDER — MORPHINE SULFATE 4 MG/ML
2 INJECTION INTRAVENOUS EVERY 4 HOURS PRN
Status: DISCONTINUED | OUTPATIENT
Start: 2025-08-08 | End: 2025-08-09

## 2025-08-08 RX ORDER — HYDROMORPHONE HYDROCHLORIDE 1 MG/ML
0.4 INJECTION, SOLUTION INTRAMUSCULAR; INTRAVENOUS; SUBCUTANEOUS
Status: DISCONTINUED | OUTPATIENT
Start: 2025-08-08 | End: 2025-08-08 | Stop reason: HOSPADM

## 2025-08-08 RX ORDER — ONDANSETRON 2 MG/ML
INJECTION INTRAMUSCULAR; INTRAVENOUS PRN
Status: DISCONTINUED | OUTPATIENT
Start: 2025-08-08 | End: 2025-08-08 | Stop reason: SURG

## 2025-08-08 RX ORDER — MEPERIDINE HYDROCHLORIDE 50 MG/ML
12.5 INJECTION INTRAMUSCULAR; INTRAVENOUS; SUBCUTANEOUS
Status: DISCONTINUED | OUTPATIENT
Start: 2025-08-08 | End: 2025-08-08 | Stop reason: HOSPADM

## 2025-08-08 RX ORDER — ROCURONIUM BROMIDE 10 MG/ML
INJECTION, SOLUTION INTRAVENOUS PRN
Status: DISCONTINUED | OUTPATIENT
Start: 2025-08-08 | End: 2025-08-08 | Stop reason: SURG

## 2025-08-08 RX ORDER — GAUZE BANDAGE 2" X 2"
100 BANDAGE TOPICAL DAILY
Status: DISCONTINUED | OUTPATIENT
Start: 2025-08-09 | End: 2025-08-22 | Stop reason: HOSPADM

## 2025-08-08 RX ORDER — HALOPERIDOL 5 MG/ML
1 INJECTION INTRAMUSCULAR
Status: DISCONTINUED | OUTPATIENT
Start: 2025-08-08 | End: 2025-08-08 | Stop reason: HOSPADM

## 2025-08-08 RX ORDER — SODIUM CHLORIDE, SODIUM LACTATE, POTASSIUM CHLORIDE, CALCIUM CHLORIDE 600; 310; 30; 20 MG/100ML; MG/100ML; MG/100ML; MG/100ML
INJECTION, SOLUTION INTRAVENOUS CONTINUOUS
Status: DISCONTINUED | OUTPATIENT
Start: 2025-08-08 | End: 2025-08-08 | Stop reason: HOSPADM

## 2025-08-08 RX ORDER — BUPIVACAINE HYDROCHLORIDE AND EPINEPHRINE 5; 5 MG/ML; UG/ML
INJECTION, SOLUTION EPIDURAL; INTRACAUDAL; PERINEURAL
Status: DISCONTINUED | OUTPATIENT
Start: 2025-08-08 | End: 2025-08-08 | Stop reason: HOSPADM

## 2025-08-08 RX ORDER — ENOXAPARIN SODIUM 100 MG/ML
40 INJECTION SUBCUTANEOUS DAILY
Status: DISCONTINUED | OUTPATIENT
Start: 2025-08-09 | End: 2025-08-22 | Stop reason: HOSPADM

## 2025-08-08 RX ORDER — ONDANSETRON 2 MG/ML
4 INJECTION INTRAMUSCULAR; INTRAVENOUS
Status: DISCONTINUED | OUTPATIENT
Start: 2025-08-08 | End: 2025-08-08 | Stop reason: HOSPADM

## 2025-08-08 RX ORDER — HYDROMORPHONE HYDROCHLORIDE 1 MG/ML
0.1 INJECTION, SOLUTION INTRAMUSCULAR; INTRAVENOUS; SUBCUTANEOUS
Status: DISCONTINUED | OUTPATIENT
Start: 2025-08-08 | End: 2025-08-08 | Stop reason: HOSPADM

## 2025-08-08 RX ORDER — SODIUM CHLORIDE, SODIUM LACTATE, POTASSIUM CHLORIDE, CALCIUM CHLORIDE 600; 310; 30; 20 MG/100ML; MG/100ML; MG/100ML; MG/100ML
INJECTION, SOLUTION INTRAVENOUS
Status: DISCONTINUED | OUTPATIENT
Start: 2025-08-08 | End: 2025-08-08 | Stop reason: SURG

## 2025-08-08 RX ORDER — OXYCODONE HCL 5 MG/5 ML
10 SOLUTION, ORAL ORAL
Status: COMPLETED | OUTPATIENT
Start: 2025-08-08 | End: 2025-08-08

## 2025-08-08 RX ORDER — OXYCODONE HCL 5 MG/5 ML
5 SOLUTION, ORAL ORAL
Status: COMPLETED | OUTPATIENT
Start: 2025-08-08 | End: 2025-08-08

## 2025-08-08 RX ORDER — DEXAMETHASONE SODIUM PHOSPHATE 4 MG/ML
INJECTION, SOLUTION INTRA-ARTICULAR; INTRALESIONAL; INTRAMUSCULAR; INTRAVENOUS; SOFT TISSUE PRN
Status: DISCONTINUED | OUTPATIENT
Start: 2025-08-08 | End: 2025-08-08 | Stop reason: SURG

## 2025-08-08 RX ORDER — LIDOCAINE HYDROCHLORIDE 20 MG/ML
INJECTION, SOLUTION EPIDURAL; INFILTRATION; INTRACAUDAL; PERINEURAL PRN
Status: DISCONTINUED | OUTPATIENT
Start: 2025-08-08 | End: 2025-08-08 | Stop reason: SURG

## 2025-08-08 RX ORDER — DIPHENHYDRAMINE HYDROCHLORIDE 50 MG/ML
12.5 INJECTION, SOLUTION INTRAMUSCULAR; INTRAVENOUS
Status: DISCONTINUED | OUTPATIENT
Start: 2025-08-08 | End: 2025-08-08 | Stop reason: HOSPADM

## 2025-08-08 RX ADMIN — FENTANYL CITRATE 100 MCG: 50 INJECTION, SOLUTION INTRAMUSCULAR; INTRAVENOUS at 01:17

## 2025-08-08 RX ADMIN — FENTANYL CITRATE 50 MCG: 50 INJECTION, SOLUTION INTRAMUSCULAR; INTRAVENOUS at 01:46

## 2025-08-08 RX ADMIN — FENTANYL CITRATE 50 MCG: 50 INJECTION, SOLUTION INTRAMUSCULAR; INTRAVENOUS at 01:33

## 2025-08-08 RX ADMIN — VANCOMYCIN HYDROCHLORIDE 750 MG: 5 INJECTION, POWDER, LYOPHILIZED, FOR SOLUTION INTRAVENOUS at 13:26

## 2025-08-08 RX ADMIN — HYDROMORPHONE HYDROCHLORIDE 0.4 MG: 1 INJECTION, SOLUTION INTRAMUSCULAR; INTRAVENOUS; SUBCUTANEOUS at 02:01

## 2025-08-08 RX ADMIN — PROPOFOL 160 MG: 10 INJECTION, EMULSION INTRAVENOUS at 00:52

## 2025-08-08 RX ADMIN — PIPERACILLIN AND TAZOBACTAM 4.5 G: 4; .5 INJECTION, POWDER, FOR SOLUTION INTRAVENOUS at 13:21

## 2025-08-08 RX ADMIN — HYDROCODONE BITARTRATE AND ACETAMINOPHEN 1 TABLET: 10; 325 TABLET ORAL at 16:53

## 2025-08-08 RX ADMIN — TAMSULOSIN HYDROCHLORIDE 0.4 MG: 0.4 CAPSULE ORAL at 06:30

## 2025-08-08 RX ADMIN — SODIUM CHLORIDE: 9 INJECTION, SOLUTION INTRAVENOUS at 03:34

## 2025-08-08 RX ADMIN — OXYCODONE HYDROCHLORIDE 10 MG: 5 SOLUTION ORAL at 01:46

## 2025-08-08 RX ADMIN — SODIUM CHLORIDE, POTASSIUM CHLORIDE, SODIUM LACTATE AND CALCIUM CHLORIDE 1000 ML: 600; 310; 30; 20 INJECTION, SOLUTION INTRAVENOUS at 00:06

## 2025-08-08 RX ADMIN — SODIUM CHLORIDE, POTASSIUM CHLORIDE, SODIUM LACTATE AND CALCIUM CHLORIDE: 600; 310; 30; 20 INJECTION, SOLUTION INTRAVENOUS at 00:49

## 2025-08-08 RX ADMIN — FENTANYL CITRATE 50 MCG: 50 INJECTION, SOLUTION INTRAMUSCULAR; INTRAVENOUS at 00:51

## 2025-08-08 RX ADMIN — HYDROCODONE BITARTRATE AND ACETAMINOPHEN 1 TABLET: 10; 325 TABLET ORAL at 06:28

## 2025-08-08 RX ADMIN — ONDANSETRON 4 MG: 2 INJECTION INTRAMUSCULAR; INTRAVENOUS at 01:34

## 2025-08-08 RX ADMIN — SUGAMMADEX 200 MG: 100 INJECTION, SOLUTION INTRAVENOUS at 01:33

## 2025-08-08 RX ADMIN — PIPERACILLIN AND TAZOBACTAM 4.5 G: 4; .5 INJECTION, POWDER, FOR SOLUTION INTRAVENOUS at 20:41

## 2025-08-08 RX ADMIN — HYDROMORPHONE HYDROCHLORIDE 0.4 MG: 1 INJECTION, SOLUTION INTRAMUSCULAR; INTRAVENOUS; SUBCUTANEOUS at 02:07

## 2025-08-08 RX ADMIN — FENTANYL CITRATE 50 MCG: 50 INJECTION, SOLUTION INTRAMUSCULAR; INTRAVENOUS at 01:53

## 2025-08-08 RX ADMIN — VANCOMYCIN HYDROCHLORIDE 1750 MG: 5 INJECTION, POWDER, LYOPHILIZED, FOR SOLUTION INTRAVENOUS at 00:30

## 2025-08-08 RX ADMIN — LIDOCAINE HYDROCHLORIDE 80 MG: 20 INJECTION, SOLUTION EPIDURAL; INFILTRATION; INTRACAUDAL; PERINEURAL at 00:52

## 2025-08-08 RX ADMIN — PIPERACILLIN AND TAZOBACTAM 4.5 G: 4; .5 INJECTION, POWDER, FOR SOLUTION INTRAVENOUS at 03:35

## 2025-08-08 RX ADMIN — ROCURONIUM BROMIDE 50 MG: 10 INJECTION INTRAVENOUS at 00:52

## 2025-08-08 RX ADMIN — MORPHINE SULFATE 2 MG: 4 INJECTION, SOLUTION INTRAMUSCULAR; INTRAVENOUS at 20:38

## 2025-08-08 RX ADMIN — DEXAMETHASONE SODIUM PHOSPHATE 4 MG: 4 INJECTION INTRA-ARTICULAR; INTRALESIONAL; INTRAMUSCULAR; INTRAVENOUS; SOFT TISSUE at 00:56

## 2025-08-08 ASSESSMENT — LIFESTYLE VARIABLES
TOTAL SCORE: 0
AVERAGE NUMBER OF DAYS PER WEEK YOU HAVE A DRINK CONTAINING ALCOHOL: 0
TOTAL SCORE: 0
ON A TYPICAL DAY WHEN YOU DRINK ALCOHOL HOW MANY DRINKS DO YOU HAVE: 0
CONSUMPTION TOTAL: NEGATIVE
HAVE YOU EVER FELT YOU SHOULD CUT DOWN ON YOUR DRINKING: NO
EVER FELT BAD OR GUILTY ABOUT YOUR DRINKING: NO
ALCOHOL_USE: NO
HAVE PEOPLE ANNOYED YOU BY CRITICIZING YOUR DRINKING: NO
HOW MANY TIMES IN THE PAST YEAR HAVE YOU HAD 5 OR MORE DRINKS IN A DAY: 0
EVER HAD A DRINK FIRST THING IN THE MORNING TO STEADY YOUR NERVES TO GET RID OF A HANGOVER: NO
TOTAL SCORE: 0

## 2025-08-08 ASSESSMENT — COGNITIVE AND FUNCTIONAL STATUS - GENERAL
SUGGESTED CMS G CODE MODIFIER DAILY ACTIVITY: CJ
SUGGESTED CMS G CODE MODIFIER MOBILITY: CK
STANDING UP FROM CHAIR USING ARMS: A LITTLE
MOBILITY SCORE: 18
DRESSING REGULAR LOWER BODY CLOTHING: A LITTLE
MOVING FROM LYING ON BACK TO SITTING ON SIDE OF FLAT BED: A LITTLE
TURNING FROM BACK TO SIDE WHILE IN FLAT BAD: A LITTLE
MOVING TO AND FROM BED TO CHAIR: A LITTLE
WALKING IN HOSPITAL ROOM: A LITTLE
CLIMB 3 TO 5 STEPS WITH RAILING: A LITTLE
DAILY ACTIVITIY SCORE: 21
TOILETING: A LITTLE
HELP NEEDED FOR BATHING: A LITTLE

## 2025-08-08 ASSESSMENT — ENCOUNTER SYMPTOMS
NECK PAIN: 1
BACK PAIN: 1

## 2025-08-08 ASSESSMENT — PATIENT HEALTH QUESTIONNAIRE - PHQ9
2. FEELING DOWN, DEPRESSED, IRRITABLE, OR HOPELESS: NOT AT ALL
1. LITTLE INTEREST OR PLEASURE IN DOING THINGS: NOT AT ALL
SUM OF ALL RESPONSES TO PHQ9 QUESTIONS 1 AND 2: 0

## 2025-08-08 ASSESSMENT — PAIN DESCRIPTION - PAIN TYPE
TYPE: SURGICAL PAIN
TYPE: SURGICAL PAIN
TYPE: ACUTE PAIN;SURGICAL PAIN
TYPE: ACUTE PAIN;SURGICAL PAIN
TYPE: CHRONIC PAIN;SURGICAL PAIN
TYPE: SURGICAL PAIN
TYPE: SURGICAL PAIN
TYPE: ACUTE PAIN;SURGICAL PAIN

## 2025-08-09 LAB
ALBUMIN SERPL BCP-MCNC: 2.5 G/DL (ref 3.2–4.9)
ANION GAP SERPL CALC-SCNC: 11 MMOL/L (ref 7–16)
BUN SERPL-MCNC: 10 MG/DL (ref 8–22)
CALCIUM ALBUM COR SERPL-MCNC: 9.5 MG/DL (ref 8.5–10.5)
CALCIUM SERPL-MCNC: 8.3 MG/DL (ref 8.5–10.5)
CHLORIDE SERPL-SCNC: 102 MMOL/L (ref 96–112)
CO2 SERPL-SCNC: 23 MMOL/L (ref 20–33)
CREAT SERPL-MCNC: 0.87 MG/DL (ref 0.5–1.4)
ERYTHROCYTE [DISTWIDTH] IN BLOOD BY AUTOMATED COUNT: 46.7 FL (ref 35.9–50)
GFR SERPLBLD CREATININE-BSD FMLA CKD-EPI: 97 ML/MIN/1.73 M 2
GLUCOSE SERPL-MCNC: 96 MG/DL (ref 65–99)
HCT VFR BLD AUTO: 29.1 % (ref 42–52)
HGB BLD-MCNC: 9.6 G/DL (ref 14–18)
MCH RBC QN AUTO: 28.2 PG (ref 27–33)
MCHC RBC AUTO-ENTMCNC: 33 G/DL (ref 32.3–36.5)
MCV RBC AUTO: 85.6 FL (ref 81.4–97.8)
PHOSPHATE SERPL-MCNC: 3.2 MG/DL (ref 2.5–4.5)
PLATELET # BLD AUTO: 529 K/UL (ref 164–446)
PMV BLD AUTO: 8.1 FL (ref 9–12.9)
POTASSIUM SERPL-SCNC: 3.7 MMOL/L (ref 3.6–5.5)
RBC # BLD AUTO: 3.4 M/UL (ref 4.7–6.1)
SCCMEC + MECA PNL NOSE NAA+PROBE: NEGATIVE
SODIUM SERPL-SCNC: 136 MMOL/L (ref 135–145)
T4 FREE SERPL-MCNC: 0.86 NG/DL (ref 0.93–1.7)
TSH SERPL DL<=0.005 MIU/L-ACNC: 15 UIU/ML (ref 0.38–5.33)
WBC # BLD AUTO: 11.2 K/UL (ref 4.8–10.8)

## 2025-08-09 PROCEDURE — 84443 ASSAY THYROID STIM HORMONE: CPT

## 2025-08-09 PROCEDURE — 80069 RENAL FUNCTION PANEL: CPT

## 2025-08-09 PROCEDURE — 700105 HCHG RX REV CODE 258: Performed by: STUDENT IN AN ORGANIZED HEALTH CARE EDUCATION/TRAINING PROGRAM

## 2025-08-09 PROCEDURE — 36415 COLL VENOUS BLD VENIPUNCTURE: CPT

## 2025-08-09 PROCEDURE — 97162 PT EVAL MOD COMPLEX 30 MIN: CPT

## 2025-08-09 PROCEDURE — 770006 HCHG ROOM/CARE - MED/SURG/GYN SEMI*

## 2025-08-09 PROCEDURE — 700105 HCHG RX REV CODE 258: Performed by: INTERNAL MEDICINE

## 2025-08-09 PROCEDURE — 85027 COMPLETE CBC AUTOMATED: CPT

## 2025-08-09 PROCEDURE — 84439 ASSAY OF FREE THYROXINE: CPT

## 2025-08-09 PROCEDURE — A9270 NON-COVERED ITEM OR SERVICE: HCPCS | Performed by: INTERNAL MEDICINE

## 2025-08-09 PROCEDURE — 51798 US URINE CAPACITY MEASURE: CPT

## 2025-08-09 PROCEDURE — 700111 HCHG RX REV CODE 636 W/ 250 OVERRIDE (IP): Mod: JZ | Performed by: STUDENT IN AN ORGANIZED HEALTH CARE EDUCATION/TRAINING PROGRAM

## 2025-08-09 PROCEDURE — 700111 HCHG RX REV CODE 636 W/ 250 OVERRIDE (IP): Mod: JZ | Performed by: INTERNAL MEDICINE

## 2025-08-09 PROCEDURE — 99233 SBSQ HOSP IP/OBS HIGH 50: CPT | Performed by: INTERNAL MEDICINE

## 2025-08-09 PROCEDURE — 700102 HCHG RX REV CODE 250 W/ 637 OVERRIDE(OP): Performed by: STUDENT IN AN ORGANIZED HEALTH CARE EDUCATION/TRAINING PROGRAM

## 2025-08-09 PROCEDURE — A9270 NON-COVERED ITEM OR SERVICE: HCPCS

## 2025-08-09 PROCEDURE — 700102 HCHG RX REV CODE 250 W/ 637 OVERRIDE(OP): Performed by: INTERNAL MEDICINE

## 2025-08-09 PROCEDURE — 700101 HCHG RX REV CODE 250: Performed by: INTERNAL MEDICINE

## 2025-08-09 PROCEDURE — 700111 HCHG RX REV CODE 636 W/ 250 OVERRIDE (IP): Mod: JZ

## 2025-08-09 PROCEDURE — 700102 HCHG RX REV CODE 250 W/ 637 OVERRIDE(OP)

## 2025-08-09 PROCEDURE — A9270 NON-COVERED ITEM OR SERVICE: HCPCS | Performed by: STUDENT IN AN ORGANIZED HEALTH CARE EDUCATION/TRAINING PROGRAM

## 2025-08-09 RX ORDER — HYDROCODONE BITARTRATE AND ACETAMINOPHEN 10; 325 MG/1; MG/1
1 TABLET ORAL EVERY 4 HOURS PRN
Refills: 0 | Status: DISCONTINUED | OUTPATIENT
Start: 2025-08-09 | End: 2025-08-13

## 2025-08-09 RX ORDER — MORPHINE SULFATE 30 MG/1
30 TABLET, FILM COATED, EXTENDED RELEASE ORAL EVERY 12 HOURS
Refills: 0 | Status: DISCONTINUED | OUTPATIENT
Start: 2025-08-09 | End: 2025-08-22 | Stop reason: HOSPADM

## 2025-08-09 RX ORDER — LIDOCAINE HYDROCHLORIDE 40 MG/ML
SOLUTION TOPICAL
Status: DISCONTINUED | OUTPATIENT
Start: 2025-08-09 | End: 2025-08-22 | Stop reason: HOSPADM

## 2025-08-09 RX ORDER — LEVOTHYROXINE SODIUM 112 UG/1
112 TABLET ORAL
Status: DISCONTINUED | OUTPATIENT
Start: 2025-08-09 | End: 2025-08-22 | Stop reason: HOSPADM

## 2025-08-09 RX ORDER — SODIUM HYPOCHLORITE 1.25 MG/ML
SOLUTION TOPICAL DAILY
Status: DISCONTINUED | OUTPATIENT
Start: 2025-08-09 | End: 2025-08-12

## 2025-08-09 RX ORDER — CELECOXIB 100 MG/1
100 CAPSULE ORAL 2 TIMES DAILY
Status: DISCONTINUED | OUTPATIENT
Start: 2025-08-09 | End: 2025-08-22 | Stop reason: HOSPADM

## 2025-08-09 RX ADMIN — Medication 100 MG: at 05:35

## 2025-08-09 RX ADMIN — DAKIN'S SOLUTION 0.125% (QUARTER STRENGTH) 1 ML: 0.12 SOLUTION at 13:00

## 2025-08-09 RX ADMIN — HYDROCODONE BITARTRATE AND ACETAMINOPHEN 1 TABLET: 10; 325 TABLET ORAL at 10:23

## 2025-08-09 RX ADMIN — AMOXICILLIN AND CLAVULANATE POTASSIUM 1 TABLET: 875; 125 TABLET, FILM COATED ORAL at 17:22

## 2025-08-09 RX ADMIN — PIPERACILLIN AND TAZOBACTAM 4.5 G: 4; .5 INJECTION, POWDER, FOR SOLUTION INTRAVENOUS at 04:38

## 2025-08-09 RX ADMIN — CELECOXIB 100 MG: 100 CAPSULE ORAL at 17:22

## 2025-08-09 RX ADMIN — MORPHINE SULFATE 2 MG: 4 INJECTION, SOLUTION INTRAMUSCULAR; INTRAVENOUS at 00:47

## 2025-08-09 RX ADMIN — LEVOTHYROXINE SODIUM 112 MCG: 0.11 TABLET ORAL at 12:28

## 2025-08-09 RX ADMIN — VANCOMYCIN HYDROCHLORIDE 750 MG: 5 INJECTION, POWDER, LYOPHILIZED, FOR SOLUTION INTRAVENOUS at 00:53

## 2025-08-09 RX ADMIN — HYDROCODONE BITARTRATE AND ACETAMINOPHEN 1 TABLET: 10; 325 TABLET ORAL at 14:01

## 2025-08-09 RX ADMIN — THERA TABS 1 TABLET: TAB at 05:35

## 2025-08-09 RX ADMIN — AMOXICILLIN AND CLAVULANATE POTASSIUM 1 TABLET: 875; 125 TABLET, FILM COATED ORAL at 10:23

## 2025-08-09 RX ADMIN — CELECOXIB 100 MG: 100 CAPSULE ORAL at 10:23

## 2025-08-09 RX ADMIN — ENOXAPARIN SODIUM 40 MG: 100 INJECTION SUBCUTANEOUS at 17:22

## 2025-08-09 RX ADMIN — TAMSULOSIN HYDROCHLORIDE 0.4 MG: 0.4 CAPSULE ORAL at 05:35

## 2025-08-09 RX ADMIN — MORPHINE SULFATE 30 MG: 30 TABLET, FILM COATED, EXTENDED RELEASE ORAL at 17:22

## 2025-08-09 RX ADMIN — MORPHINE SULFATE 2 MG: 4 INJECTION, SOLUTION INTRAMUSCULAR; INTRAVENOUS at 04:37

## 2025-08-09 ASSESSMENT — PAIN DESCRIPTION - PAIN TYPE
TYPE: OTHER (COMMENT)
TYPE: ACUTE PAIN;SURGICAL PAIN
TYPE: ACUTE PAIN
TYPE: ACUTE PAIN;SURGICAL PAIN
TYPE: ACUTE PAIN

## 2025-08-09 ASSESSMENT — ENCOUNTER SYMPTOMS
BACK PAIN: 1
NECK PAIN: 1

## 2025-08-10 LAB
ALBUMIN SERPL BCP-MCNC: 2.6 G/DL (ref 3.2–4.9)
ANION GAP SERPL CALC-SCNC: 12 MMOL/L (ref 7–16)
BACTERIA BLD CULT: ABNORMAL
BACTERIA BLD CULT: ABNORMAL
BACTERIA WND AEROBE CULT: ABNORMAL
BUN SERPL-MCNC: 7 MG/DL (ref 8–22)
CALCIUM ALBUM COR SERPL-MCNC: 9.6 MG/DL (ref 8.5–10.5)
CALCIUM SERPL-MCNC: 8.5 MG/DL (ref 8.5–10.5)
CHLORIDE SERPL-SCNC: 102 MMOL/L (ref 96–112)
CO2 SERPL-SCNC: 22 MMOL/L (ref 20–33)
CREAT SERPL-MCNC: 0.78 MG/DL (ref 0.5–1.4)
ERYTHROCYTE [DISTWIDTH] IN BLOOD BY AUTOMATED COUNT: 47.7 FL (ref 35.9–50)
GFR SERPLBLD CREATININE-BSD FMLA CKD-EPI: 100 ML/MIN/1.73 M 2
GLUCOSE SERPL-MCNC: 113 MG/DL (ref 65–99)
GRAM STN SPEC: ABNORMAL
GRAM STN SPEC: ABNORMAL
HCT VFR BLD AUTO: 32.3 % (ref 42–52)
HGB BLD-MCNC: 10.5 G/DL (ref 14–18)
MCH RBC QN AUTO: 28.4 PG (ref 27–33)
MCHC RBC AUTO-ENTMCNC: 32.5 G/DL (ref 32.3–36.5)
MCV RBC AUTO: 87.3 FL (ref 81.4–97.8)
PHOSPHATE SERPL-MCNC: 3.4 MG/DL (ref 2.5–4.5)
PLATELET # BLD AUTO: 542 K/UL (ref 164–446)
PMV BLD AUTO: 7.9 FL (ref 9–12.9)
POTASSIUM SERPL-SCNC: 3.3 MMOL/L (ref 3.6–5.5)
RBC # BLD AUTO: 3.7 M/UL (ref 4.7–6.1)
SIGNIFICANT IND 70042: ABNORMAL
SITE SITE: ABNORMAL
SODIUM SERPL-SCNC: 136 MMOL/L (ref 135–145)
SOURCE SOURCE: ABNORMAL
WBC # BLD AUTO: 8.1 K/UL (ref 4.8–10.8)

## 2025-08-10 PROCEDURE — 700102 HCHG RX REV CODE 250 W/ 637 OVERRIDE(OP)

## 2025-08-10 PROCEDURE — A9270 NON-COVERED ITEM OR SERVICE: HCPCS | Performed by: STUDENT IN AN ORGANIZED HEALTH CARE EDUCATION/TRAINING PROGRAM

## 2025-08-10 PROCEDURE — 85027 COMPLETE CBC AUTOMATED: CPT

## 2025-08-10 PROCEDURE — 80069 RENAL FUNCTION PANEL: CPT

## 2025-08-10 PROCEDURE — 700111 HCHG RX REV CODE 636 W/ 250 OVERRIDE (IP): Mod: JZ | Performed by: INTERNAL MEDICINE

## 2025-08-10 PROCEDURE — 36415 COLL VENOUS BLD VENIPUNCTURE: CPT

## 2025-08-10 PROCEDURE — 99233 SBSQ HOSP IP/OBS HIGH 50: CPT | Performed by: INTERNAL MEDICINE

## 2025-08-10 PROCEDURE — 770006 HCHG ROOM/CARE - MED/SURG/GYN SEMI*

## 2025-08-10 PROCEDURE — A9270 NON-COVERED ITEM OR SERVICE: HCPCS

## 2025-08-10 PROCEDURE — 700102 HCHG RX REV CODE 250 W/ 637 OVERRIDE(OP): Performed by: STUDENT IN AN ORGANIZED HEALTH CARE EDUCATION/TRAINING PROGRAM

## 2025-08-10 PROCEDURE — 700102 HCHG RX REV CODE 250 W/ 637 OVERRIDE(OP): Performed by: INTERNAL MEDICINE

## 2025-08-10 PROCEDURE — A9270 NON-COVERED ITEM OR SERVICE: HCPCS | Performed by: INTERNAL MEDICINE

## 2025-08-10 RX ORDER — SULFAMETHOXAZOLE AND TRIMETHOPRIM 800; 160 MG/1; MG/1
1 TABLET ORAL EVERY 12 HOURS
Status: COMPLETED | OUTPATIENT
Start: 2025-08-10 | End: 2025-08-16

## 2025-08-10 RX ORDER — POTASSIUM CHLORIDE 1500 MG/1
20 TABLET, EXTENDED RELEASE ORAL DAILY
Status: COMPLETED | OUTPATIENT
Start: 2025-08-11 | End: 2025-08-11

## 2025-08-10 RX ADMIN — LEVOTHYROXINE SODIUM 112 MCG: 0.11 TABLET ORAL at 05:15

## 2025-08-10 RX ADMIN — SULFAMETHOXAZOLE AND TRIMETHOPRIM 1 TABLET: 800; 160 TABLET ORAL at 08:57

## 2025-08-10 RX ADMIN — SULFAMETHOXAZOLE AND TRIMETHOPRIM 1 TABLET: 800; 160 TABLET ORAL at 17:40

## 2025-08-10 RX ADMIN — HYDROCODONE BITARTRATE AND ACETAMINOPHEN 1 TABLET: 10; 325 TABLET ORAL at 14:28

## 2025-08-10 RX ADMIN — ENOXAPARIN SODIUM 40 MG: 100 INJECTION SUBCUTANEOUS at 17:39

## 2025-08-10 RX ADMIN — TAMSULOSIN HYDROCHLORIDE 0.4 MG: 0.4 CAPSULE ORAL at 05:14

## 2025-08-10 RX ADMIN — CELECOXIB 100 MG: 100 CAPSULE ORAL at 17:40

## 2025-08-10 RX ADMIN — THERA TABS 1 TABLET: TAB at 05:14

## 2025-08-10 RX ADMIN — MORPHINE SULFATE 30 MG: 30 TABLET, FILM COATED, EXTENDED RELEASE ORAL at 17:40

## 2025-08-10 RX ADMIN — MORPHINE SULFATE 30 MG: 30 TABLET, FILM COATED, EXTENDED RELEASE ORAL at 05:15

## 2025-08-10 RX ADMIN — Medication 100 MG: at 05:15

## 2025-08-10 RX ADMIN — CELECOXIB 100 MG: 100 CAPSULE ORAL at 05:15

## 2025-08-10 RX ADMIN — AMOXICILLIN AND CLAVULANATE POTASSIUM 1 TABLET: 875; 125 TABLET, FILM COATED ORAL at 05:14

## 2025-08-10 RX ADMIN — DAKIN'S SOLUTION 0.125% (QUARTER STRENGTH) 100 ML: 0.12 SOLUTION at 05:15

## 2025-08-10 ASSESSMENT — ENCOUNTER SYMPTOMS
BACK PAIN: 1
NECK PAIN: 1

## 2025-08-10 ASSESSMENT — PAIN DESCRIPTION - PAIN TYPE
TYPE: ACUTE PAIN

## 2025-08-10 ASSESSMENT — FIBROSIS 4 INDEX: FIB4 SCORE: 1.06

## 2025-08-11 LAB
ALBUMIN SERPL BCP-MCNC: 2.7 G/DL (ref 3.2–4.9)
ANION GAP SERPL CALC-SCNC: 10 MMOL/L (ref 7–16)
BACTERIA SPEC ANAEROBE CULT: NORMAL
BACTERIA SPEC ANAEROBE CULT: NORMAL
BUN SERPL-MCNC: 6 MG/DL (ref 8–22)
CALCIUM ALBUM COR SERPL-MCNC: 9.5 MG/DL (ref 8.5–10.5)
CALCIUM SERPL-MCNC: 8.5 MG/DL (ref 8.5–10.5)
CHLORIDE SERPL-SCNC: 105 MMOL/L (ref 96–112)
CO2 SERPL-SCNC: 24 MMOL/L (ref 20–33)
CREAT SERPL-MCNC: 0.84 MG/DL (ref 0.5–1.4)
ERYTHROCYTE [DISTWIDTH] IN BLOOD BY AUTOMATED COUNT: 46.5 FL (ref 35.9–50)
GFR SERPLBLD CREATININE-BSD FMLA CKD-EPI: 98 ML/MIN/1.73 M 2
GLUCOSE SERPL-MCNC: 96 MG/DL (ref 65–99)
HCT VFR BLD AUTO: 29.2 % (ref 42–52)
HGB BLD-MCNC: 9.3 G/DL (ref 14–18)
MCH RBC QN AUTO: 28.1 PG (ref 27–33)
MCHC RBC AUTO-ENTMCNC: 31.8 G/DL (ref 32.3–36.5)
MCV RBC AUTO: 88.2 FL (ref 81.4–97.8)
PHOSPHATE SERPL-MCNC: 3.5 MG/DL (ref 2.5–4.5)
PLATELET # BLD AUTO: 550 K/UL (ref 164–446)
PMV BLD AUTO: 7.9 FL (ref 9–12.9)
POTASSIUM SERPL-SCNC: 3.8 MMOL/L (ref 3.6–5.5)
RBC # BLD AUTO: 3.31 M/UL (ref 4.7–6.1)
SIGNIFICANT IND 70042: NORMAL
SIGNIFICANT IND 70042: NORMAL
SITE SITE: NORMAL
SITE SITE: NORMAL
SODIUM SERPL-SCNC: 139 MMOL/L (ref 135–145)
SOURCE SOURCE: NORMAL
SOURCE SOURCE: NORMAL
WBC # BLD AUTO: 6.6 K/UL (ref 4.8–10.8)

## 2025-08-11 PROCEDURE — 700102 HCHG RX REV CODE 250 W/ 637 OVERRIDE(OP)

## 2025-08-11 PROCEDURE — 36415 COLL VENOUS BLD VENIPUNCTURE: CPT

## 2025-08-11 PROCEDURE — 700111 HCHG RX REV CODE 636 W/ 250 OVERRIDE (IP): Mod: JZ | Performed by: INTERNAL MEDICINE

## 2025-08-11 PROCEDURE — 85027 COMPLETE CBC AUTOMATED: CPT

## 2025-08-11 PROCEDURE — 770006 HCHG ROOM/CARE - MED/SURG/GYN SEMI*

## 2025-08-11 PROCEDURE — A9270 NON-COVERED ITEM OR SERVICE: HCPCS | Performed by: INTERNAL MEDICINE

## 2025-08-11 PROCEDURE — A9270 NON-COVERED ITEM OR SERVICE: HCPCS | Performed by: STUDENT IN AN ORGANIZED HEALTH CARE EDUCATION/TRAINING PROGRAM

## 2025-08-11 PROCEDURE — 700102 HCHG RX REV CODE 250 W/ 637 OVERRIDE(OP): Performed by: INTERNAL MEDICINE

## 2025-08-11 PROCEDURE — A9270 NON-COVERED ITEM OR SERVICE: HCPCS | Performed by: NURSE PRACTITIONER

## 2025-08-11 PROCEDURE — 99233 SBSQ HOSP IP/OBS HIGH 50: CPT | Performed by: INTERNAL MEDICINE

## 2025-08-11 PROCEDURE — 97535 SELF CARE MNGMENT TRAINING: CPT

## 2025-08-11 PROCEDURE — 700102 HCHG RX REV CODE 250 W/ 637 OVERRIDE(OP): Performed by: STUDENT IN AN ORGANIZED HEALTH CARE EDUCATION/TRAINING PROGRAM

## 2025-08-11 PROCEDURE — 97161 PT EVAL LOW COMPLEX 20 MIN: CPT

## 2025-08-11 PROCEDURE — 80069 RENAL FUNCTION PANEL: CPT

## 2025-08-11 PROCEDURE — 700102 HCHG RX REV CODE 250 W/ 637 OVERRIDE(OP): Performed by: NURSE PRACTITIONER

## 2025-08-11 PROCEDURE — RXMED WILLOW AMBULATORY MEDICATION CHARGE: Performed by: INTERNAL MEDICINE

## 2025-08-11 PROCEDURE — A9270 NON-COVERED ITEM OR SERVICE: HCPCS

## 2025-08-11 RX ORDER — AMOXICILLIN 250 MG
1 CAPSULE ORAL DAILY
COMMUNITY
Start: 2025-08-11

## 2025-08-11 RX ORDER — LACTOBACILLUS RHAMNOSUS GG 10B CELL
1 CAPSULE ORAL
COMMUNITY
Start: 2025-08-12

## 2025-08-11 RX ORDER — CELECOXIB 100 MG/1
100 CAPSULE ORAL 2 TIMES DAILY
Qty: 60 CAPSULE | Refills: 0 | Status: SHIPPED | OUTPATIENT
Start: 2025-08-11 | End: 2025-08-26

## 2025-08-11 RX ORDER — SODIUM HYPOCHLORITE 1.25 MG/ML
100 SOLUTION TOPICAL DAILY
COMMUNITY
Start: 2025-08-12 | End: 2025-08-26

## 2025-08-11 RX ORDER — LANOLIN ALCOHOL/MO/W.PET/CERES
100 CREAM (GRAM) TOPICAL DAILY
COMMUNITY
Start: 2025-08-12 | End: 2025-08-26

## 2025-08-11 RX ORDER — HYDROCODONE BITARTRATE AND ACETAMINOPHEN 10; 325 MG/1; MG/1
1 TABLET ORAL EVERY 6 HOURS PRN
Qty: 12 TABLET | Refills: 0 | Status: SHIPPED | OUTPATIENT
Start: 2025-08-11 | End: 2025-08-14

## 2025-08-11 RX ORDER — SULFAMETHOXAZOLE AND TRIMETHOPRIM 800; 160 MG/1; MG/1
1 TABLET ORAL EVERY 12 HOURS
Qty: 12 TABLET | Refills: 0 | Status: ACTIVE | OUTPATIENT
Start: 2025-08-11 | End: 2025-08-28

## 2025-08-11 RX ORDER — LACTOBACILLUS RHAMNOSUS GG 10B CELL
1 CAPSULE ORAL
Status: DISCONTINUED | OUTPATIENT
Start: 2025-08-12 | End: 2025-08-22 | Stop reason: HOSPADM

## 2025-08-11 RX ORDER — POLYETHYLENE GLYCOL 3350 17 G/17G
17 POWDER, FOR SOLUTION ORAL DAILY
COMMUNITY
Start: 2025-08-11

## 2025-08-11 RX ORDER — ACETAMINOPHEN 325 MG/1
650 TABLET ORAL EVERY 6 HOURS PRN
Qty: 30 TABLET | Refills: 0 | Status: SHIPPED | OUTPATIENT
Start: 2025-08-11

## 2025-08-11 RX ADMIN — DAKIN'S SOLUTION 0.125% (QUARTER STRENGTH) 100 ML: 0.12 SOLUTION at 05:20

## 2025-08-11 RX ADMIN — POTASSIUM CHLORIDE 20 MEQ: 1500 TABLET, EXTENDED RELEASE ORAL at 05:21

## 2025-08-11 RX ADMIN — Medication 100 MG: at 05:20

## 2025-08-11 RX ADMIN — MORPHINE SULFATE 30 MG: 30 TABLET, FILM COATED, EXTENDED RELEASE ORAL at 05:20

## 2025-08-11 RX ADMIN — LEVOTHYROXINE SODIUM 112 MCG: 0.11 TABLET ORAL at 05:20

## 2025-08-11 RX ADMIN — SULFAMETHOXAZOLE AND TRIMETHOPRIM 1 TABLET: 800; 160 TABLET ORAL at 17:21

## 2025-08-11 RX ADMIN — CELECOXIB 100 MG: 100 CAPSULE ORAL at 05:20

## 2025-08-11 RX ADMIN — CELECOXIB 100 MG: 100 CAPSULE ORAL at 17:21

## 2025-08-11 RX ADMIN — HYDROCODONE BITARTRATE AND ACETAMINOPHEN 1 TABLET: 10; 325 TABLET ORAL at 19:51

## 2025-08-11 RX ADMIN — SULFAMETHOXAZOLE AND TRIMETHOPRIM 1 TABLET: 800; 160 TABLET ORAL at 05:20

## 2025-08-11 RX ADMIN — THERA TABS 1 TABLET: TAB at 05:20

## 2025-08-11 RX ADMIN — ENOXAPARIN SODIUM 40 MG: 100 INJECTION SUBCUTANEOUS at 17:21

## 2025-08-11 RX ADMIN — TAMSULOSIN HYDROCHLORIDE 0.4 MG: 0.4 CAPSULE ORAL at 05:20

## 2025-08-11 RX ADMIN — MORPHINE SULFATE 30 MG: 30 TABLET, FILM COATED, EXTENDED RELEASE ORAL at 17:21

## 2025-08-11 ASSESSMENT — PAIN DESCRIPTION - PAIN TYPE
TYPE: ACUTE PAIN
TYPE: ACUTE PAIN

## 2025-08-11 ASSESSMENT — COGNITIVE AND FUNCTIONAL STATUS - GENERAL
WALKING IN HOSPITAL ROOM: A LITTLE
MOVING TO AND FROM BED TO CHAIR: A LITTLE
TURNING FROM BACK TO SIDE WHILE IN FLAT BAD: A LITTLE
MOVING FROM LYING ON BACK TO SITTING ON SIDE OF FLAT BED: A LITTLE
CLIMB 3 TO 5 STEPS WITH RAILING: A LITTLE
STANDING UP FROM CHAIR USING ARMS: A LITTLE
SUGGESTED CMS G CODE MODIFIER MOBILITY: CK
MOBILITY SCORE: 18

## 2025-08-11 ASSESSMENT — ENCOUNTER SYMPTOMS
BACK PAIN: 1
NECK PAIN: 1

## 2025-08-11 ASSESSMENT — GAIT ASSESSMENTS
GAIT LEVEL OF ASSIST: SUPERVISED
DISTANCE (FEET): 200

## 2025-08-12 PROBLEM — D72.829 LEUKOCYTOSIS: Status: RESOLVED | Noted: 2025-08-07 | Resolved: 2025-08-12

## 2025-08-12 PROBLEM — E87.1 HYPONATREMIA: Status: RESOLVED | Noted: 2025-08-07 | Resolved: 2025-08-12

## 2025-08-12 LAB
ERYTHROCYTE [DISTWIDTH] IN BLOOD BY AUTOMATED COUNT: 46.5 FL (ref 35.9–50)
HCT VFR BLD AUTO: 31.7 % (ref 42–52)
HGB BLD-MCNC: 10.1 G/DL (ref 14–18)
MCH RBC QN AUTO: 27.9 PG (ref 27–33)
MCHC RBC AUTO-ENTMCNC: 31.9 G/DL (ref 32.3–36.5)
MCV RBC AUTO: 87.6 FL (ref 81.4–97.8)
PLATELET # BLD AUTO: 588 K/UL (ref 164–446)
PMV BLD AUTO: 7.9 FL (ref 9–12.9)
RBC # BLD AUTO: 3.62 M/UL (ref 4.7–6.1)
WBC # BLD AUTO: 6.4 K/UL (ref 4.8–10.8)

## 2025-08-12 PROCEDURE — 97605 NEG PRS WND THER DME<=50SQCM: CPT

## 2025-08-12 PROCEDURE — 770006 HCHG ROOM/CARE - MED/SURG/GYN SEMI*

## 2025-08-12 PROCEDURE — 700102 HCHG RX REV CODE 250 W/ 637 OVERRIDE(OP): Performed by: STUDENT IN AN ORGANIZED HEALTH CARE EDUCATION/TRAINING PROGRAM

## 2025-08-12 PROCEDURE — 36415 COLL VENOUS BLD VENIPUNCTURE: CPT

## 2025-08-12 PROCEDURE — 99232 SBSQ HOSP IP/OBS MODERATE 35: CPT | Performed by: HOSPITALIST

## 2025-08-12 PROCEDURE — 700111 HCHG RX REV CODE 636 W/ 250 OVERRIDE (IP): Mod: JZ | Performed by: INTERNAL MEDICINE

## 2025-08-12 PROCEDURE — A9270 NON-COVERED ITEM OR SERVICE: HCPCS

## 2025-08-12 PROCEDURE — 97602 WOUND(S) CARE NON-SELECTIVE: CPT

## 2025-08-12 PROCEDURE — A9270 NON-COVERED ITEM OR SERVICE: HCPCS | Performed by: STUDENT IN AN ORGANIZED HEALTH CARE EDUCATION/TRAINING PROGRAM

## 2025-08-12 PROCEDURE — 85027 COMPLETE CBC AUTOMATED: CPT

## 2025-08-12 PROCEDURE — 700102 HCHG RX REV CODE 250 W/ 637 OVERRIDE(OP)

## 2025-08-12 PROCEDURE — A9270 NON-COVERED ITEM OR SERVICE: HCPCS | Performed by: INTERNAL MEDICINE

## 2025-08-12 PROCEDURE — 700102 HCHG RX REV CODE 250 W/ 637 OVERRIDE(OP): Performed by: INTERNAL MEDICINE

## 2025-08-12 RX ADMIN — TAMSULOSIN HYDROCHLORIDE 0.4 MG: 0.4 CAPSULE ORAL at 05:55

## 2025-08-12 RX ADMIN — Medication 100 MG: at 05:55

## 2025-08-12 RX ADMIN — ENOXAPARIN SODIUM 40 MG: 100 INJECTION SUBCUTANEOUS at 17:36

## 2025-08-12 RX ADMIN — DAKIN'S SOLUTION 0.125% (QUARTER STRENGTH) 20 ML: 0.12 SOLUTION at 05:54

## 2025-08-12 RX ADMIN — LEVOTHYROXINE SODIUM 112 MCG: 0.11 TABLET ORAL at 05:55

## 2025-08-12 RX ADMIN — CELECOXIB 100 MG: 100 CAPSULE ORAL at 17:37

## 2025-08-12 RX ADMIN — THERA TABS 1 TABLET: TAB at 05:55

## 2025-08-12 RX ADMIN — HYDROCODONE BITARTRATE AND ACETAMINOPHEN 1 TABLET: 10; 325 TABLET ORAL at 15:01

## 2025-08-12 RX ADMIN — HYDROCODONE BITARTRATE AND ACETAMINOPHEN 1 TABLET: 10; 325 TABLET ORAL at 19:25

## 2025-08-12 RX ADMIN — CELECOXIB 100 MG: 100 CAPSULE ORAL at 05:55

## 2025-08-12 RX ADMIN — Medication 1 CAPSULE: at 08:17

## 2025-08-12 RX ADMIN — HYDROCODONE BITARTRATE AND ACETAMINOPHEN 1 TABLET: 10; 325 TABLET ORAL at 08:21

## 2025-08-12 RX ADMIN — MORPHINE SULFATE 30 MG: 30 TABLET, FILM COATED, EXTENDED RELEASE ORAL at 17:36

## 2025-08-12 RX ADMIN — SULFAMETHOXAZOLE AND TRIMETHOPRIM 1 TABLET: 800; 160 TABLET ORAL at 05:55

## 2025-08-12 RX ADMIN — SULFAMETHOXAZOLE AND TRIMETHOPRIM 1 TABLET: 800; 160 TABLET ORAL at 17:36

## 2025-08-12 RX ADMIN — MORPHINE SULFATE 30 MG: 30 TABLET, FILM COATED, EXTENDED RELEASE ORAL at 05:55

## 2025-08-12 ASSESSMENT — PAIN DESCRIPTION - PAIN TYPE
TYPE: ACUTE PAIN

## 2025-08-12 ASSESSMENT — ENCOUNTER SYMPTOMS
SHORTNESS OF BREATH: 0
FEVER: 0
ABDOMINAL PAIN: 0

## 2025-08-13 ENCOUNTER — TELEPHONE (OUTPATIENT)
Dept: INTERNAL MEDICINE | Facility: OTHER | Age: 65
End: 2025-08-13
Payer: MEDICAID

## 2025-08-13 LAB
BACTERIA BLD CULT: NORMAL
SIGNIFICANT IND 70042: NORMAL
SITE SITE: NORMAL
SOURCE SOURCE: NORMAL

## 2025-08-13 PROCEDURE — A9270 NON-COVERED ITEM OR SERVICE: HCPCS | Performed by: INTERNAL MEDICINE

## 2025-08-13 PROCEDURE — A9270 NON-COVERED ITEM OR SERVICE: HCPCS | Performed by: HOSPITALIST

## 2025-08-13 PROCEDURE — 700102 HCHG RX REV CODE 250 W/ 637 OVERRIDE(OP)

## 2025-08-13 PROCEDURE — A9270 NON-COVERED ITEM OR SERVICE: HCPCS | Performed by: STUDENT IN AN ORGANIZED HEALTH CARE EDUCATION/TRAINING PROGRAM

## 2025-08-13 PROCEDURE — 97166 OT EVAL MOD COMPLEX 45 MIN: CPT

## 2025-08-13 PROCEDURE — 99232 SBSQ HOSP IP/OBS MODERATE 35: CPT | Performed by: HOSPITALIST

## 2025-08-13 PROCEDURE — 770001 HCHG ROOM/CARE - MED/SURG/GYN PRIV*

## 2025-08-13 PROCEDURE — A9270 NON-COVERED ITEM OR SERVICE: HCPCS

## 2025-08-13 PROCEDURE — 700102 HCHG RX REV CODE 250 W/ 637 OVERRIDE(OP): Performed by: STUDENT IN AN ORGANIZED HEALTH CARE EDUCATION/TRAINING PROGRAM

## 2025-08-13 PROCEDURE — 700102 HCHG RX REV CODE 250 W/ 637 OVERRIDE(OP): Performed by: INTERNAL MEDICINE

## 2025-08-13 PROCEDURE — 700102 HCHG RX REV CODE 250 W/ 637 OVERRIDE(OP): Performed by: HOSPITALIST

## 2025-08-13 RX ORDER — OXYCODONE HYDROCHLORIDE 10 MG/1
10 TABLET ORAL EVERY 4 HOURS PRN
Refills: 0 | Status: DISCONTINUED | OUTPATIENT
Start: 2025-08-13 | End: 2025-08-22 | Stop reason: HOSPADM

## 2025-08-13 RX ORDER — ACETAMINOPHEN 500 MG
1000 TABLET ORAL EVERY 6 HOURS
Status: DISPENSED | OUTPATIENT
Start: 2025-08-13 | End: 2025-08-18

## 2025-08-13 RX ORDER — HYDROMORPHONE HYDROCHLORIDE 1 MG/ML
1 INJECTION, SOLUTION INTRAMUSCULAR; INTRAVENOUS; SUBCUTANEOUS
Status: DISCONTINUED | OUTPATIENT
Start: 2025-08-13 | End: 2025-08-22 | Stop reason: HOSPADM

## 2025-08-13 RX ORDER — ACETAMINOPHEN 500 MG
1000 TABLET ORAL EVERY 6 HOURS PRN
Status: DISCONTINUED | OUTPATIENT
Start: 2025-08-18 | End: 2025-08-22 | Stop reason: HOSPADM

## 2025-08-13 RX ORDER — OXYCODONE HYDROCHLORIDE 10 MG/1
10 TABLET ORAL
Refills: 0 | Status: DISCONTINUED | OUTPATIENT
Start: 2025-08-13 | End: 2025-08-13

## 2025-08-13 RX ORDER — OXYCODONE HYDROCHLORIDE 10 MG/1
20 TABLET ORAL
Refills: 0 | Status: DISCONTINUED | OUTPATIENT
Start: 2025-08-13 | End: 2025-08-22 | Stop reason: HOSPADM

## 2025-08-13 RX ORDER — OXYCODONE HYDROCHLORIDE 5 MG/1
5 TABLET ORAL
Refills: 0 | Status: DISCONTINUED | OUTPATIENT
Start: 2025-08-13 | End: 2025-08-13

## 2025-08-13 RX ORDER — HYDROMORPHONE HYDROCHLORIDE 1 MG/ML
0.5 INJECTION, SOLUTION INTRAMUSCULAR; INTRAVENOUS; SUBCUTANEOUS
Status: DISCONTINUED | OUTPATIENT
Start: 2025-08-13 | End: 2025-08-13

## 2025-08-13 RX ADMIN — ACETAMINOPHEN 1000 MG: 500 TABLET ORAL at 17:21

## 2025-08-13 RX ADMIN — OXYCODONE HYDROCHLORIDE 20 MG: 10 TABLET ORAL at 12:31

## 2025-08-13 RX ADMIN — SULFAMETHOXAZOLE AND TRIMETHOPRIM 1 TABLET: 800; 160 TABLET ORAL at 05:54

## 2025-08-13 RX ADMIN — MORPHINE SULFATE 30 MG: 30 TABLET, FILM COATED, EXTENDED RELEASE ORAL at 05:53

## 2025-08-13 RX ADMIN — HYDROCODONE BITARTRATE AND ACETAMINOPHEN 1 TABLET: 10; 325 TABLET ORAL at 09:16

## 2025-08-13 RX ADMIN — SULFAMETHOXAZOLE AND TRIMETHOPRIM 1 TABLET: 800; 160 TABLET ORAL at 17:21

## 2025-08-13 RX ADMIN — Medication 1 CAPSULE: at 09:16

## 2025-08-13 RX ADMIN — CELECOXIB 100 MG: 100 CAPSULE ORAL at 05:54

## 2025-08-13 RX ADMIN — MORPHINE SULFATE 30 MG: 30 TABLET, FILM COATED, EXTENDED RELEASE ORAL at 17:22

## 2025-08-13 RX ADMIN — Medication 100 MG: at 05:54

## 2025-08-13 RX ADMIN — LEVOTHYROXINE SODIUM 112 MCG: 0.11 TABLET ORAL at 05:54

## 2025-08-13 RX ADMIN — ACETAMINOPHEN 1000 MG: 500 TABLET ORAL at 23:02

## 2025-08-13 RX ADMIN — THERA TABS 1 TABLET: TAB at 05:54

## 2025-08-13 RX ADMIN — OXYCODONE HYDROCHLORIDE 20 MG: 10 TABLET ORAL at 23:02

## 2025-08-13 RX ADMIN — CELECOXIB 100 MG: 100 CAPSULE ORAL at 17:22

## 2025-08-13 RX ADMIN — TAMSULOSIN HYDROCHLORIDE 0.4 MG: 0.4 CAPSULE ORAL at 05:54

## 2025-08-13 ASSESSMENT — COGNITIVE AND FUNCTIONAL STATUS - GENERAL
DAILY ACTIVITIY SCORE: 24
SUGGESTED CMS G CODE MODIFIER DAILY ACTIVITY: CH

## 2025-08-13 ASSESSMENT — PAIN DESCRIPTION - PAIN TYPE
TYPE: ACUTE PAIN
TYPE: CHRONIC PAIN;SURGICAL PAIN
TYPE: ACUTE PAIN
TYPE: ACUTE PAIN
TYPE: CHRONIC PAIN;SURGICAL PAIN
TYPE: ACUTE PAIN

## 2025-08-13 ASSESSMENT — ACTIVITIES OF DAILY LIVING (ADL): TOILETING: INDEPENDENT

## 2025-08-13 ASSESSMENT — ENCOUNTER SYMPTOMS
SHORTNESS OF BREATH: 0
ABDOMINAL PAIN: 0
FEVER: 0

## 2025-08-14 ENCOUNTER — APPOINTMENT (OUTPATIENT)
Dept: INTERNAL MEDICINE | Facility: OTHER | Age: 65
End: 2025-08-14
Payer: MEDICAID

## 2025-08-14 LAB
ANION GAP SERPL CALC-SCNC: 9 MMOL/L (ref 7–16)
BUN SERPL-MCNC: 12 MG/DL (ref 8–22)
CALCIUM SERPL-MCNC: 9.2 MG/DL (ref 8.5–10.5)
CHLORIDE SERPL-SCNC: 103 MMOL/L (ref 96–112)
CO2 SERPL-SCNC: 24 MMOL/L (ref 20–33)
CREAT SERPL-MCNC: 1.09 MG/DL (ref 0.5–1.4)
ERYTHROCYTE [DISTWIDTH] IN BLOOD BY AUTOMATED COUNT: 47.5 FL (ref 35.9–50)
GFR SERPLBLD CREATININE-BSD FMLA CKD-EPI: 76 ML/MIN/1.73 M 2
GLUCOSE SERPL-MCNC: 101 MG/DL (ref 65–99)
HCT VFR BLD AUTO: 36.4 % (ref 42–52)
HGB BLD-MCNC: 11.6 G/DL (ref 14–18)
MAGNESIUM SERPL-MCNC: 2.4 MG/DL (ref 1.5–2.5)
MCH RBC QN AUTO: 28.4 PG (ref 27–33)
MCHC RBC AUTO-ENTMCNC: 31.9 G/DL (ref 32.3–36.5)
MCV RBC AUTO: 89.2 FL (ref 81.4–97.8)
PLATELET # BLD AUTO: 638 K/UL (ref 164–446)
PMV BLD AUTO: 7.8 FL (ref 9–12.9)
POTASSIUM SERPL-SCNC: 5.5 MMOL/L (ref 3.6–5.5)
RBC # BLD AUTO: 4.08 M/UL (ref 4.7–6.1)
SODIUM SERPL-SCNC: 136 MMOL/L (ref 135–145)
WBC # BLD AUTO: 5.5 K/UL (ref 4.8–10.8)

## 2025-08-14 PROCEDURE — A9270 NON-COVERED ITEM OR SERVICE: HCPCS

## 2025-08-14 PROCEDURE — A9270 NON-COVERED ITEM OR SERVICE: HCPCS | Performed by: INTERNAL MEDICINE

## 2025-08-14 PROCEDURE — 83735 ASSAY OF MAGNESIUM: CPT

## 2025-08-14 PROCEDURE — 700102 HCHG RX REV CODE 250 W/ 637 OVERRIDE(OP): Performed by: HOSPITALIST

## 2025-08-14 PROCEDURE — 700102 HCHG RX REV CODE 250 W/ 637 OVERRIDE(OP): Performed by: INTERNAL MEDICINE

## 2025-08-14 PROCEDURE — A9270 NON-COVERED ITEM OR SERVICE: HCPCS | Performed by: STUDENT IN AN ORGANIZED HEALTH CARE EDUCATION/TRAINING PROGRAM

## 2025-08-14 PROCEDURE — 80048 BASIC METABOLIC PNL TOTAL CA: CPT

## 2025-08-14 PROCEDURE — 36415 COLL VENOUS BLD VENIPUNCTURE: CPT

## 2025-08-14 PROCEDURE — A9270 NON-COVERED ITEM OR SERVICE: HCPCS | Performed by: HOSPITALIST

## 2025-08-14 PROCEDURE — 97605 NEG PRS WND THER DME<=50SQCM: CPT

## 2025-08-14 PROCEDURE — 85027 COMPLETE CBC AUTOMATED: CPT

## 2025-08-14 PROCEDURE — 700102 HCHG RX REV CODE 250 W/ 637 OVERRIDE(OP)

## 2025-08-14 PROCEDURE — 770001 HCHG ROOM/CARE - MED/SURG/GYN PRIV*

## 2025-08-14 PROCEDURE — 700102 HCHG RX REV CODE 250 W/ 637 OVERRIDE(OP): Performed by: STUDENT IN AN ORGANIZED HEALTH CARE EDUCATION/TRAINING PROGRAM

## 2025-08-14 PROCEDURE — 99232 SBSQ HOSP IP/OBS MODERATE 35: CPT | Performed by: HOSPITALIST

## 2025-08-14 RX ADMIN — OXYCODONE HYDROCHLORIDE 20 MG: 10 TABLET ORAL at 17:43

## 2025-08-14 RX ADMIN — THERA TABS 1 TABLET: TAB at 06:20

## 2025-08-14 RX ADMIN — CELECOXIB 100 MG: 100 CAPSULE ORAL at 17:43

## 2025-08-14 RX ADMIN — Medication 100 MG: at 06:20

## 2025-08-14 RX ADMIN — ACETAMINOPHEN 1000 MG: 500 TABLET ORAL at 17:46

## 2025-08-14 RX ADMIN — TAMSULOSIN HYDROCHLORIDE 0.4 MG: 0.4 CAPSULE ORAL at 06:20

## 2025-08-14 RX ADMIN — CELECOXIB 100 MG: 100 CAPSULE ORAL at 06:20

## 2025-08-14 RX ADMIN — ACETAMINOPHEN 1000 MG: 500 TABLET ORAL at 06:20

## 2025-08-14 RX ADMIN — SULFAMETHOXAZOLE AND TRIMETHOPRIM 1 TABLET: 800; 160 TABLET ORAL at 17:46

## 2025-08-14 RX ADMIN — Medication 5 MG: at 20:13

## 2025-08-14 RX ADMIN — MORPHINE SULFATE 30 MG: 30 TABLET, FILM COATED, EXTENDED RELEASE ORAL at 17:43

## 2025-08-14 RX ADMIN — LEVOTHYROXINE SODIUM 112 MCG: 0.11 TABLET ORAL at 06:20

## 2025-08-14 RX ADMIN — SULFAMETHOXAZOLE AND TRIMETHOPRIM 1 TABLET: 800; 160 TABLET ORAL at 06:20

## 2025-08-14 RX ADMIN — MORPHINE SULFATE 30 MG: 30 TABLET, FILM COATED, EXTENDED RELEASE ORAL at 06:20

## 2025-08-14 RX ADMIN — OXYCODONE HYDROCHLORIDE 20 MG: 10 TABLET ORAL at 06:26

## 2025-08-14 RX ADMIN — OXYCODONE HYDROCHLORIDE 20 MG: 10 TABLET ORAL at 09:58

## 2025-08-14 RX ADMIN — Medication 1 CAPSULE: at 10:01

## 2025-08-14 ASSESSMENT — PAIN DESCRIPTION - PAIN TYPE
TYPE: ACUTE PAIN;CHRONIC PAIN
TYPE: CHRONIC PAIN;SURGICAL PAIN
TYPE: ACUTE PAIN;CHRONIC PAIN
TYPE: SURGICAL PAIN;ACUTE PAIN
TYPE: ACUTE PAIN;CHRONIC PAIN
TYPE: ACUTE PAIN
TYPE: CHRONIC PAIN;SURGICAL PAIN
TYPE: SURGICAL PAIN;ACUTE PAIN
TYPE: ACUTE PAIN;CHRONIC PAIN

## 2025-08-14 ASSESSMENT — PATIENT HEALTH QUESTIONNAIRE - PHQ9
SUM OF ALL RESPONSES TO PHQ9 QUESTIONS 1 AND 2: 0
1. LITTLE INTEREST OR PLEASURE IN DOING THINGS: NOT AT ALL
2. FEELING DOWN, DEPRESSED, IRRITABLE, OR HOPELESS: NOT AT ALL

## 2025-08-14 ASSESSMENT — ENCOUNTER SYMPTOMS
ABDOMINAL PAIN: 0
SHORTNESS OF BREATH: 0
FEVER: 0

## 2025-08-15 LAB
ANION GAP SERPL CALC-SCNC: 10 MMOL/L (ref 7–16)
BUN SERPL-MCNC: 16 MG/DL (ref 8–22)
CALCIUM SERPL-MCNC: 9.2 MG/DL (ref 8.5–10.5)
CHLORIDE SERPL-SCNC: 102 MMOL/L (ref 96–112)
CO2 SERPL-SCNC: 25 MMOL/L (ref 20–33)
CREAT SERPL-MCNC: 1.27 MG/DL (ref 0.5–1.4)
ERYTHROCYTE [DISTWIDTH] IN BLOOD BY AUTOMATED COUNT: 49.8 FL (ref 35.9–50)
GFR SERPLBLD CREATININE-BSD FMLA CKD-EPI: 63 ML/MIN/1.73 M 2
GLUCOSE SERPL-MCNC: 108 MG/DL (ref 65–99)
HCT VFR BLD AUTO: 38.3 % (ref 42–52)
HGB BLD-MCNC: 12.4 G/DL (ref 14–18)
MCH RBC QN AUTO: 29 PG (ref 27–33)
MCHC RBC AUTO-ENTMCNC: 32.4 G/DL (ref 32.3–36.5)
MCV RBC AUTO: 89.5 FL (ref 81.4–97.8)
PLATELET # BLD AUTO: 633 K/UL (ref 164–446)
PMV BLD AUTO: 7.9 FL (ref 9–12.9)
POTASSIUM SERPL-SCNC: 5.5 MMOL/L (ref 3.6–5.5)
RBC # BLD AUTO: 4.28 M/UL (ref 4.7–6.1)
SODIUM SERPL-SCNC: 137 MMOL/L (ref 135–145)
WBC # BLD AUTO: 5.8 K/UL (ref 4.8–10.8)

## 2025-08-15 PROCEDURE — A9270 NON-COVERED ITEM OR SERVICE: HCPCS | Performed by: HOSPITALIST

## 2025-08-15 PROCEDURE — 700102 HCHG RX REV CODE 250 W/ 637 OVERRIDE(OP): Performed by: STUDENT IN AN ORGANIZED HEALTH CARE EDUCATION/TRAINING PROGRAM

## 2025-08-15 PROCEDURE — A9270 NON-COVERED ITEM OR SERVICE: HCPCS | Performed by: STUDENT IN AN ORGANIZED HEALTH CARE EDUCATION/TRAINING PROGRAM

## 2025-08-15 PROCEDURE — 700102 HCHG RX REV CODE 250 W/ 637 OVERRIDE(OP): Performed by: HOSPITALIST

## 2025-08-15 PROCEDURE — A9270 NON-COVERED ITEM OR SERVICE: HCPCS

## 2025-08-15 PROCEDURE — A9270 NON-COVERED ITEM OR SERVICE: HCPCS | Performed by: INTERNAL MEDICINE

## 2025-08-15 PROCEDURE — 700111 HCHG RX REV CODE 636 W/ 250 OVERRIDE (IP): Mod: JZ | Performed by: INTERNAL MEDICINE

## 2025-08-15 PROCEDURE — 770001 HCHG ROOM/CARE - MED/SURG/GYN PRIV*

## 2025-08-15 PROCEDURE — 36415 COLL VENOUS BLD VENIPUNCTURE: CPT

## 2025-08-15 PROCEDURE — 99232 SBSQ HOSP IP/OBS MODERATE 35: CPT | Performed by: HOSPITALIST

## 2025-08-15 PROCEDURE — 85027 COMPLETE CBC AUTOMATED: CPT

## 2025-08-15 PROCEDURE — 80048 BASIC METABOLIC PNL TOTAL CA: CPT

## 2025-08-15 PROCEDURE — 97605 NEG PRS WND THER DME<=50SQCM: CPT

## 2025-08-15 PROCEDURE — 700102 HCHG RX REV CODE 250 W/ 637 OVERRIDE(OP): Performed by: INTERNAL MEDICINE

## 2025-08-15 PROCEDURE — 700102 HCHG RX REV CODE 250 W/ 637 OVERRIDE(OP)

## 2025-08-15 RX ADMIN — LEVOTHYROXINE SODIUM 112 MCG: 0.11 TABLET ORAL at 06:20

## 2025-08-15 RX ADMIN — ACETAMINOPHEN 1000 MG: 500 TABLET ORAL at 12:00

## 2025-08-15 RX ADMIN — OXYCODONE HYDROCHLORIDE 20 MG: 10 TABLET ORAL at 02:31

## 2025-08-15 RX ADMIN — MORPHINE SULFATE 30 MG: 30 TABLET, FILM COATED, EXTENDED RELEASE ORAL at 06:20

## 2025-08-15 RX ADMIN — ENOXAPARIN SODIUM 40 MG: 100 INJECTION SUBCUTANEOUS at 17:16

## 2025-08-15 RX ADMIN — OXYCODONE HYDROCHLORIDE 20 MG: 10 TABLET ORAL at 21:03

## 2025-08-15 RX ADMIN — SULFAMETHOXAZOLE AND TRIMETHOPRIM 1 TABLET: 800; 160 TABLET ORAL at 06:20

## 2025-08-15 RX ADMIN — THERA TABS 1 TABLET: TAB at 06:19

## 2025-08-15 RX ADMIN — SULFAMETHOXAZOLE AND TRIMETHOPRIM 1 TABLET: 800; 160 TABLET ORAL at 17:16

## 2025-08-15 RX ADMIN — TAMSULOSIN HYDROCHLORIDE 0.4 MG: 0.4 CAPSULE ORAL at 06:20

## 2025-08-15 RX ADMIN — Medication 100 MG: at 06:20

## 2025-08-15 RX ADMIN — Medication 1 CAPSULE: at 09:34

## 2025-08-15 RX ADMIN — ACETAMINOPHEN 1000 MG: 500 TABLET ORAL at 06:19

## 2025-08-15 RX ADMIN — CELECOXIB 100 MG: 100 CAPSULE ORAL at 17:16

## 2025-08-15 RX ADMIN — CELECOXIB 100 MG: 100 CAPSULE ORAL at 06:20

## 2025-08-15 RX ADMIN — OXYCODONE HYDROCHLORIDE 20 MG: 10 TABLET ORAL at 09:34

## 2025-08-15 RX ADMIN — MORPHINE SULFATE 30 MG: 30 TABLET, FILM COATED, EXTENDED RELEASE ORAL at 17:16

## 2025-08-15 RX ADMIN — ACETAMINOPHEN 1000 MG: 500 TABLET ORAL at 17:16

## 2025-08-15 RX ADMIN — Medication 5 MG: at 21:05

## 2025-08-15 ASSESSMENT — PAIN DESCRIPTION - PAIN TYPE
TYPE: SURGICAL PAIN;ACUTE PAIN
TYPE: SURGICAL PAIN;ACUTE PAIN
TYPE: ACUTE PAIN;SURGICAL PAIN
TYPE: ACUTE PAIN;SURGICAL PAIN
TYPE: ACUTE PAIN
TYPE: ACUTE PAIN;SURGICAL PAIN;CHRONIC PAIN

## 2025-08-15 ASSESSMENT — ENCOUNTER SYMPTOMS
FEVER: 0
ABDOMINAL PAIN: 0
SHORTNESS OF BREATH: 0

## 2025-08-16 PROCEDURE — 770001 HCHG ROOM/CARE - MED/SURG/GYN PRIV*

## 2025-08-16 PROCEDURE — A9270 NON-COVERED ITEM OR SERVICE: HCPCS | Performed by: INTERNAL MEDICINE

## 2025-08-16 PROCEDURE — 700102 HCHG RX REV CODE 250 W/ 637 OVERRIDE(OP): Performed by: HOSPITALIST

## 2025-08-16 PROCEDURE — A9270 NON-COVERED ITEM OR SERVICE: HCPCS | Performed by: STUDENT IN AN ORGANIZED HEALTH CARE EDUCATION/TRAINING PROGRAM

## 2025-08-16 PROCEDURE — 700102 HCHG RX REV CODE 250 W/ 637 OVERRIDE(OP): Performed by: STUDENT IN AN ORGANIZED HEALTH CARE EDUCATION/TRAINING PROGRAM

## 2025-08-16 PROCEDURE — 700102 HCHG RX REV CODE 250 W/ 637 OVERRIDE(OP)

## 2025-08-16 PROCEDURE — 700102 HCHG RX REV CODE 250 W/ 637 OVERRIDE(OP): Performed by: INTERNAL MEDICINE

## 2025-08-16 PROCEDURE — 700111 HCHG RX REV CODE 636 W/ 250 OVERRIDE (IP): Mod: JZ | Performed by: INTERNAL MEDICINE

## 2025-08-16 PROCEDURE — 99231 SBSQ HOSP IP/OBS SF/LOW 25: CPT | Performed by: HOSPITALIST

## 2025-08-16 PROCEDURE — A9270 NON-COVERED ITEM OR SERVICE: HCPCS | Performed by: HOSPITALIST

## 2025-08-16 PROCEDURE — A9270 NON-COVERED ITEM OR SERVICE: HCPCS

## 2025-08-16 RX ADMIN — THERA TABS 1 TABLET: TAB at 06:19

## 2025-08-16 RX ADMIN — MORPHINE SULFATE 30 MG: 30 TABLET, FILM COATED, EXTENDED RELEASE ORAL at 06:19

## 2025-08-16 RX ADMIN — ENOXAPARIN SODIUM 40 MG: 100 INJECTION SUBCUTANEOUS at 17:05

## 2025-08-16 RX ADMIN — ACETAMINOPHEN 1000 MG: 500 TABLET ORAL at 17:05

## 2025-08-16 RX ADMIN — Medication 1 CAPSULE: at 08:43

## 2025-08-16 RX ADMIN — TAMSULOSIN HYDROCHLORIDE 0.4 MG: 0.4 CAPSULE ORAL at 06:19

## 2025-08-16 RX ADMIN — LEVOTHYROXINE SODIUM 112 MCG: 0.11 TABLET ORAL at 06:19

## 2025-08-16 RX ADMIN — ACETAMINOPHEN 1000 MG: 500 TABLET ORAL at 06:18

## 2025-08-16 RX ADMIN — OXYCODONE HYDROCHLORIDE 10 MG: 10 TABLET ORAL at 19:52

## 2025-08-16 RX ADMIN — CELECOXIB 100 MG: 100 CAPSULE ORAL at 17:05

## 2025-08-16 RX ADMIN — Medication 100 MG: at 06:19

## 2025-08-16 RX ADMIN — SULFAMETHOXAZOLE AND TRIMETHOPRIM 1 TABLET: 800; 160 TABLET ORAL at 06:18

## 2025-08-16 RX ADMIN — OXYCODONE HYDROCHLORIDE 20 MG: 10 TABLET ORAL at 15:22

## 2025-08-16 RX ADMIN — MORPHINE SULFATE 30 MG: 30 TABLET, FILM COATED, EXTENDED RELEASE ORAL at 17:05

## 2025-08-16 RX ADMIN — Medication 5 MG: at 19:56

## 2025-08-16 RX ADMIN — CELECOXIB 100 MG: 100 CAPSULE ORAL at 06:19

## 2025-08-16 ASSESSMENT — PAIN DESCRIPTION - PAIN TYPE
TYPE: ACUTE PAIN;SURGICAL PAIN
TYPE: ACUTE PAIN

## 2025-08-16 ASSESSMENT — ENCOUNTER SYMPTOMS
SHORTNESS OF BREATH: 0
FEVER: 0
ABDOMINAL PAIN: 0

## 2025-08-17 PROCEDURE — 700102 HCHG RX REV CODE 250 W/ 637 OVERRIDE(OP): Performed by: STUDENT IN AN ORGANIZED HEALTH CARE EDUCATION/TRAINING PROGRAM

## 2025-08-17 PROCEDURE — 700102 HCHG RX REV CODE 250 W/ 637 OVERRIDE(OP): Performed by: HOSPITALIST

## 2025-08-17 PROCEDURE — 700111 HCHG RX REV CODE 636 W/ 250 OVERRIDE (IP): Mod: JZ | Performed by: INTERNAL MEDICINE

## 2025-08-17 PROCEDURE — A9270 NON-COVERED ITEM OR SERVICE: HCPCS | Performed by: INTERNAL MEDICINE

## 2025-08-17 PROCEDURE — A9270 NON-COVERED ITEM OR SERVICE: HCPCS | Performed by: STUDENT IN AN ORGANIZED HEALTH CARE EDUCATION/TRAINING PROGRAM

## 2025-08-17 PROCEDURE — A9270 NON-COVERED ITEM OR SERVICE: HCPCS

## 2025-08-17 PROCEDURE — 99231 SBSQ HOSP IP/OBS SF/LOW 25: CPT | Performed by: HOSPITALIST

## 2025-08-17 PROCEDURE — 700102 HCHG RX REV CODE 250 W/ 637 OVERRIDE(OP): Performed by: INTERNAL MEDICINE

## 2025-08-17 PROCEDURE — 700102 HCHG RX REV CODE 250 W/ 637 OVERRIDE(OP)

## 2025-08-17 PROCEDURE — A9270 NON-COVERED ITEM OR SERVICE: HCPCS | Performed by: HOSPITALIST

## 2025-08-17 PROCEDURE — 770001 HCHG ROOM/CARE - MED/SURG/GYN PRIV*

## 2025-08-17 RX ADMIN — Medication 1 CAPSULE: at 09:28

## 2025-08-17 RX ADMIN — MORPHINE SULFATE 30 MG: 30 TABLET, FILM COATED, EXTENDED RELEASE ORAL at 04:46

## 2025-08-17 RX ADMIN — CELECOXIB 100 MG: 100 CAPSULE ORAL at 04:45

## 2025-08-17 RX ADMIN — OXYCODONE HYDROCHLORIDE 20 MG: 10 TABLET ORAL at 17:49

## 2025-08-17 RX ADMIN — TAMSULOSIN HYDROCHLORIDE 0.4 MG: 0.4 CAPSULE ORAL at 04:46

## 2025-08-17 RX ADMIN — OXYCODONE HYDROCHLORIDE 20 MG: 10 TABLET ORAL at 22:22

## 2025-08-17 RX ADMIN — Medication 5 MG: at 22:23

## 2025-08-17 RX ADMIN — Medication 100 MG: at 04:45

## 2025-08-17 RX ADMIN — OXYCODONE HYDROCHLORIDE 20 MG: 10 TABLET ORAL at 09:28

## 2025-08-17 RX ADMIN — CELECOXIB 100 MG: 100 CAPSULE ORAL at 17:49

## 2025-08-17 RX ADMIN — ACETAMINOPHEN 1000 MG: 500 TABLET ORAL at 04:45

## 2025-08-17 RX ADMIN — LEVOTHYROXINE SODIUM 112 MCG: 0.11 TABLET ORAL at 04:45

## 2025-08-17 RX ADMIN — THERA TABS 1 TABLET: TAB at 04:45

## 2025-08-17 RX ADMIN — ENOXAPARIN SODIUM 40 MG: 100 INJECTION SUBCUTANEOUS at 17:49

## 2025-08-17 RX ADMIN — MORPHINE SULFATE 30 MG: 30 TABLET, FILM COATED, EXTENDED RELEASE ORAL at 17:49

## 2025-08-17 ASSESSMENT — ENCOUNTER SYMPTOMS
ABDOMINAL PAIN: 0
SHORTNESS OF BREATH: 0
FEVER: 0
DIARRHEA: 0

## 2025-08-17 ASSESSMENT — PAIN DESCRIPTION - PAIN TYPE
TYPE: ACUTE PAIN

## 2025-08-18 PROCEDURE — A9270 NON-COVERED ITEM OR SERVICE: HCPCS

## 2025-08-18 PROCEDURE — 700111 HCHG RX REV CODE 636 W/ 250 OVERRIDE (IP): Mod: JZ | Performed by: INTERNAL MEDICINE

## 2025-08-18 PROCEDURE — 700102 HCHG RX REV CODE 250 W/ 637 OVERRIDE(OP)

## 2025-08-18 PROCEDURE — 700102 HCHG RX REV CODE 250 W/ 637 OVERRIDE(OP): Performed by: HOSPITALIST

## 2025-08-18 PROCEDURE — 99231 SBSQ HOSP IP/OBS SF/LOW 25: CPT | Performed by: HOSPITALIST

## 2025-08-18 PROCEDURE — A9270 NON-COVERED ITEM OR SERVICE: HCPCS | Performed by: HOSPITALIST

## 2025-08-18 PROCEDURE — A9270 NON-COVERED ITEM OR SERVICE: HCPCS | Performed by: STUDENT IN AN ORGANIZED HEALTH CARE EDUCATION/TRAINING PROGRAM

## 2025-08-18 PROCEDURE — 97605 NEG PRS WND THER DME<=50SQCM: CPT

## 2025-08-18 PROCEDURE — A9270 NON-COVERED ITEM OR SERVICE: HCPCS | Performed by: INTERNAL MEDICINE

## 2025-08-18 PROCEDURE — 700102 HCHG RX REV CODE 250 W/ 637 OVERRIDE(OP): Performed by: INTERNAL MEDICINE

## 2025-08-18 PROCEDURE — 770001 HCHG ROOM/CARE - MED/SURG/GYN PRIV*

## 2025-08-18 PROCEDURE — 700102 HCHG RX REV CODE 250 W/ 637 OVERRIDE(OP): Performed by: STUDENT IN AN ORGANIZED HEALTH CARE EDUCATION/TRAINING PROGRAM

## 2025-08-18 RX ADMIN — ENOXAPARIN SODIUM 40 MG: 100 INJECTION SUBCUTANEOUS at 17:43

## 2025-08-18 RX ADMIN — MORPHINE SULFATE 30 MG: 30 TABLET, FILM COATED, EXTENDED RELEASE ORAL at 05:03

## 2025-08-18 RX ADMIN — TAMSULOSIN HYDROCHLORIDE 0.4 MG: 0.4 CAPSULE ORAL at 05:03

## 2025-08-18 RX ADMIN — OXYCODONE HYDROCHLORIDE 20 MG: 10 TABLET ORAL at 21:36

## 2025-08-18 RX ADMIN — CELECOXIB 100 MG: 100 CAPSULE ORAL at 17:43

## 2025-08-18 RX ADMIN — MORPHINE SULFATE 30 MG: 30 TABLET, FILM COATED, EXTENDED RELEASE ORAL at 17:43

## 2025-08-18 RX ADMIN — Medication 100 MG: at 05:03

## 2025-08-18 RX ADMIN — ACETAMINOPHEN 1000 MG: 500 TABLET ORAL at 05:03

## 2025-08-18 RX ADMIN — OXYCODONE HYDROCHLORIDE 20 MG: 10 TABLET ORAL at 08:58

## 2025-08-18 RX ADMIN — Medication 1 CAPSULE: at 08:54

## 2025-08-18 RX ADMIN — Medication 5 MG: at 21:36

## 2025-08-18 RX ADMIN — CELECOXIB 100 MG: 100 CAPSULE ORAL at 05:03

## 2025-08-18 RX ADMIN — THERA TABS 1 TABLET: TAB at 05:03

## 2025-08-18 RX ADMIN — LEVOTHYROXINE SODIUM 112 MCG: 0.11 TABLET ORAL at 05:03

## 2025-08-18 ASSESSMENT — PAIN DESCRIPTION - PAIN TYPE
TYPE: ACUTE PAIN

## 2025-08-18 ASSESSMENT — ENCOUNTER SYMPTOMS
SHORTNESS OF BREATH: 0
ABDOMINAL PAIN: 0
FEVER: 0

## 2025-08-19 PROBLEM — E03.9 HYPOTHYROIDISM: Status: ACTIVE | Noted: 2025-08-19

## 2025-08-19 PROBLEM — D75.839 THROMBOCYTOSIS: Status: ACTIVE | Noted: 2025-08-19

## 2025-08-19 PROCEDURE — 770001 HCHG ROOM/CARE - MED/SURG/GYN PRIV*

## 2025-08-19 PROCEDURE — 700102 HCHG RX REV CODE 250 W/ 637 OVERRIDE(OP)

## 2025-08-19 PROCEDURE — A9270 NON-COVERED ITEM OR SERVICE: HCPCS | Performed by: INTERNAL MEDICINE

## 2025-08-19 PROCEDURE — A9270 NON-COVERED ITEM OR SERVICE: HCPCS | Performed by: STUDENT IN AN ORGANIZED HEALTH CARE EDUCATION/TRAINING PROGRAM

## 2025-08-19 PROCEDURE — 700102 HCHG RX REV CODE 250 W/ 637 OVERRIDE(OP): Performed by: STUDENT IN AN ORGANIZED HEALTH CARE EDUCATION/TRAINING PROGRAM

## 2025-08-19 PROCEDURE — 700102 HCHG RX REV CODE 250 W/ 637 OVERRIDE(OP): Performed by: INTERNAL MEDICINE

## 2025-08-19 PROCEDURE — A9270 NON-COVERED ITEM OR SERVICE: HCPCS

## 2025-08-19 PROCEDURE — A9270 NON-COVERED ITEM OR SERVICE: HCPCS | Performed by: HOSPITALIST

## 2025-08-19 PROCEDURE — 700102 HCHG RX REV CODE 250 W/ 637 OVERRIDE(OP): Performed by: HOSPITALIST

## 2025-08-19 PROCEDURE — 700111 HCHG RX REV CODE 636 W/ 250 OVERRIDE (IP): Mod: JZ | Performed by: INTERNAL MEDICINE

## 2025-08-19 PROCEDURE — 99232 SBSQ HOSP IP/OBS MODERATE 35: CPT | Performed by: INTERNAL MEDICINE

## 2025-08-19 RX ORDER — AMOXICILLIN 250 MG
2 CAPSULE ORAL EVERY EVENING
Status: DISCONTINUED | OUTPATIENT
Start: 2025-08-19 | End: 2025-08-22 | Stop reason: HOSPADM

## 2025-08-19 RX ORDER — POLYETHYLENE GLYCOL 3350 17 G/17G
1 POWDER, FOR SOLUTION ORAL
Status: DISCONTINUED | OUTPATIENT
Start: 2025-08-19 | End: 2025-08-22 | Stop reason: HOSPADM

## 2025-08-19 RX ADMIN — THERA TABS 1 TABLET: TAB at 05:05

## 2025-08-19 RX ADMIN — CELECOXIB 100 MG: 100 CAPSULE ORAL at 05:06

## 2025-08-19 RX ADMIN — TAMSULOSIN HYDROCHLORIDE 0.4 MG: 0.4 CAPSULE ORAL at 05:06

## 2025-08-19 RX ADMIN — Medication 5 MG: at 22:17

## 2025-08-19 RX ADMIN — OXYCODONE HYDROCHLORIDE 20 MG: 10 TABLET ORAL at 22:16

## 2025-08-19 RX ADMIN — MORPHINE SULFATE 30 MG: 30 TABLET, FILM COATED, EXTENDED RELEASE ORAL at 05:05

## 2025-08-19 RX ADMIN — LEVOTHYROXINE SODIUM 112 MCG: 0.11 TABLET ORAL at 05:05

## 2025-08-19 RX ADMIN — SENNOSIDES, DOCUSATE SODIUM 2 TABLET: 50; 8.6 TABLET, FILM COATED ORAL at 17:52

## 2025-08-19 RX ADMIN — ENOXAPARIN SODIUM 40 MG: 100 INJECTION SUBCUTANEOUS at 17:52

## 2025-08-19 RX ADMIN — Medication 100 MG: at 05:06

## 2025-08-19 RX ADMIN — Medication 1 CAPSULE: at 08:36

## 2025-08-19 RX ADMIN — MORPHINE SULFATE 30 MG: 30 TABLET, FILM COATED, EXTENDED RELEASE ORAL at 17:52

## 2025-08-19 RX ADMIN — CELECOXIB 100 MG: 100 CAPSULE ORAL at 17:52

## 2025-08-19 ASSESSMENT — PAIN DESCRIPTION - PAIN TYPE
TYPE: ACUTE PAIN

## 2025-08-19 ASSESSMENT — ENCOUNTER SYMPTOMS
ABDOMINAL PAIN: 0
SHORTNESS OF BREATH: 0
FEVER: 0

## 2025-08-20 PROCEDURE — A9270 NON-COVERED ITEM OR SERVICE: HCPCS | Performed by: STUDENT IN AN ORGANIZED HEALTH CARE EDUCATION/TRAINING PROGRAM

## 2025-08-20 PROCEDURE — A9270 NON-COVERED ITEM OR SERVICE: HCPCS | Performed by: INTERNAL MEDICINE

## 2025-08-20 PROCEDURE — 97605 NEG PRS WND THER DME<=50SQCM: CPT

## 2025-08-20 PROCEDURE — A9270 NON-COVERED ITEM OR SERVICE: HCPCS | Performed by: HOSPITALIST

## 2025-08-20 PROCEDURE — A9270 NON-COVERED ITEM OR SERVICE: HCPCS

## 2025-08-20 PROCEDURE — 700102 HCHG RX REV CODE 250 W/ 637 OVERRIDE(OP)

## 2025-08-20 PROCEDURE — 700102 HCHG RX REV CODE 250 W/ 637 OVERRIDE(OP): Performed by: INTERNAL MEDICINE

## 2025-08-20 PROCEDURE — 700102 HCHG RX REV CODE 250 W/ 637 OVERRIDE(OP): Performed by: HOSPITALIST

## 2025-08-20 PROCEDURE — 700102 HCHG RX REV CODE 250 W/ 637 OVERRIDE(OP): Performed by: STUDENT IN AN ORGANIZED HEALTH CARE EDUCATION/TRAINING PROGRAM

## 2025-08-20 PROCEDURE — 770001 HCHG ROOM/CARE - MED/SURG/GYN PRIV*

## 2025-08-20 PROCEDURE — 700111 HCHG RX REV CODE 636 W/ 250 OVERRIDE (IP): Mod: JZ | Performed by: INTERNAL MEDICINE

## 2025-08-20 PROCEDURE — 99231 SBSQ HOSP IP/OBS SF/LOW 25: CPT | Performed by: INTERNAL MEDICINE

## 2025-08-20 RX ADMIN — ENOXAPARIN SODIUM 40 MG: 100 INJECTION SUBCUTANEOUS at 16:52

## 2025-08-20 RX ADMIN — MORPHINE SULFATE 30 MG: 30 TABLET, FILM COATED, EXTENDED RELEASE ORAL at 04:34

## 2025-08-20 RX ADMIN — Medication 5 MG: at 21:09

## 2025-08-20 RX ADMIN — LEVOTHYROXINE SODIUM 112 MCG: 0.11 TABLET ORAL at 04:33

## 2025-08-20 RX ADMIN — TAMSULOSIN HYDROCHLORIDE 0.4 MG: 0.4 CAPSULE ORAL at 04:33

## 2025-08-20 RX ADMIN — Medication 1 CAPSULE: at 08:46

## 2025-08-20 RX ADMIN — CELECOXIB 100 MG: 100 CAPSULE ORAL at 04:33

## 2025-08-20 RX ADMIN — THERA TABS 1 TABLET: TAB at 04:33

## 2025-08-20 RX ADMIN — OXYCODONE HYDROCHLORIDE 20 MG: 10 TABLET ORAL at 21:10

## 2025-08-20 RX ADMIN — CELECOXIB 100 MG: 100 CAPSULE ORAL at 16:51

## 2025-08-20 RX ADMIN — Medication 100 MG: at 04:34

## 2025-08-20 RX ADMIN — SENNOSIDES, DOCUSATE SODIUM 2 TABLET: 50; 8.6 TABLET, FILM COATED ORAL at 16:52

## 2025-08-20 RX ADMIN — OXYCODONE HYDROCHLORIDE 20 MG: 10 TABLET ORAL at 10:26

## 2025-08-20 RX ADMIN — MORPHINE SULFATE 30 MG: 30 TABLET, FILM COATED, EXTENDED RELEASE ORAL at 16:51

## 2025-08-20 ASSESSMENT — ENCOUNTER SYMPTOMS
FEVER: 0
ABDOMINAL PAIN: 0
SHORTNESS OF BREATH: 0

## 2025-08-20 ASSESSMENT — PAIN DESCRIPTION - PAIN TYPE
TYPE: ACUTE PAIN
TYPE: CHRONIC PAIN
TYPE: ACUTE PAIN;CHRONIC PAIN
TYPE: CHRONIC PAIN
TYPE: CHRONIC PAIN
TYPE: ACUTE PAIN

## 2025-08-21 ENCOUNTER — OFFICE VISIT (OUTPATIENT)
Dept: WOUND CARE | Facility: MEDICAL CENTER | Age: 65
End: 2025-08-21
Payer: MEDICAID

## 2025-08-21 DIAGNOSIS — Z53.8 APPOINTMENT CANCELED BY HOSPITAL: Primary | ICD-10-CM

## 2025-08-21 PROCEDURE — 700102 HCHG RX REV CODE 250 W/ 637 OVERRIDE(OP): Performed by: HOSPITALIST

## 2025-08-21 PROCEDURE — A9270 NON-COVERED ITEM OR SERVICE: HCPCS | Performed by: INTERNAL MEDICINE

## 2025-08-21 PROCEDURE — 700102 HCHG RX REV CODE 250 W/ 637 OVERRIDE(OP): Performed by: INTERNAL MEDICINE

## 2025-08-21 PROCEDURE — 700102 HCHG RX REV CODE 250 W/ 637 OVERRIDE(OP): Performed by: STUDENT IN AN ORGANIZED HEALTH CARE EDUCATION/TRAINING PROGRAM

## 2025-08-21 PROCEDURE — A9270 NON-COVERED ITEM OR SERVICE: HCPCS

## 2025-08-21 PROCEDURE — A9270 NON-COVERED ITEM OR SERVICE: HCPCS | Performed by: STUDENT IN AN ORGANIZED HEALTH CARE EDUCATION/TRAINING PROGRAM

## 2025-08-21 PROCEDURE — 700102 HCHG RX REV CODE 250 W/ 637 OVERRIDE(OP)

## 2025-08-21 PROCEDURE — 770006 HCHG ROOM/CARE - MED/SURG/GYN SEMI*

## 2025-08-21 PROCEDURE — 700111 HCHG RX REV CODE 636 W/ 250 OVERRIDE (IP): Mod: JZ | Performed by: INTERNAL MEDICINE

## 2025-08-21 PROCEDURE — 99232 SBSQ HOSP IP/OBS MODERATE 35: CPT | Performed by: INTERNAL MEDICINE

## 2025-08-21 PROCEDURE — A9270 NON-COVERED ITEM OR SERVICE: HCPCS | Performed by: HOSPITALIST

## 2025-08-21 RX ADMIN — TAMSULOSIN HYDROCHLORIDE 0.4 MG: 0.4 CAPSULE ORAL at 05:19

## 2025-08-21 RX ADMIN — CELECOXIB 100 MG: 100 CAPSULE ORAL at 18:03

## 2025-08-21 RX ADMIN — ENOXAPARIN SODIUM 40 MG: 100 INJECTION SUBCUTANEOUS at 18:03

## 2025-08-21 RX ADMIN — Medication 1 CAPSULE: at 08:39

## 2025-08-21 RX ADMIN — Medication 100 MG: at 05:20

## 2025-08-21 RX ADMIN — OXYCODONE HYDROCHLORIDE 20 MG: 10 TABLET ORAL at 20:37

## 2025-08-21 RX ADMIN — SENNOSIDES, DOCUSATE SODIUM 2 TABLET: 50; 8.6 TABLET, FILM COATED ORAL at 18:03

## 2025-08-21 RX ADMIN — Medication 5 MG: at 20:37

## 2025-08-21 RX ADMIN — THERA TABS 1 TABLET: TAB at 05:19

## 2025-08-21 RX ADMIN — CELECOXIB 100 MG: 100 CAPSULE ORAL at 05:19

## 2025-08-21 RX ADMIN — LEVOTHYROXINE SODIUM 112 MCG: 0.11 TABLET ORAL at 05:19

## 2025-08-21 RX ADMIN — MORPHINE SULFATE 30 MG: 30 TABLET, FILM COATED, EXTENDED RELEASE ORAL at 18:03

## 2025-08-21 RX ADMIN — MORPHINE SULFATE 30 MG: 30 TABLET, FILM COATED, EXTENDED RELEASE ORAL at 05:19

## 2025-08-21 ASSESSMENT — PAIN DESCRIPTION - PAIN TYPE
TYPE: ACUTE PAIN
TYPE: CHRONIC PAIN
TYPE: ACUTE PAIN
TYPE: ACUTE PAIN

## 2025-08-21 ASSESSMENT — ENCOUNTER SYMPTOMS
FEVER: 0
ABDOMINAL PAIN: 0
SHORTNESS OF BREATH: 0

## 2025-08-22 ENCOUNTER — PHARMACY VISIT (OUTPATIENT)
Dept: PHARMACY | Facility: MEDICAL CENTER | Age: 65
End: 2025-08-22
Payer: COMMERCIAL

## 2025-08-22 VITALS
HEIGHT: 70 IN | HEART RATE: 70 BPM | DIASTOLIC BLOOD PRESSURE: 75 MMHG | TEMPERATURE: 97.5 F | RESPIRATION RATE: 18 BRPM | SYSTOLIC BLOOD PRESSURE: 120 MMHG | BODY MASS INDEX: 20.48 KG/M2 | OXYGEN SATURATION: 96 % | WEIGHT: 143.08 LBS

## 2025-08-22 PROCEDURE — 99239 HOSP IP/OBS DSCHRG MGMT >30: CPT | Performed by: INTERNAL MEDICINE

## 2025-08-22 PROCEDURE — A9270 NON-COVERED ITEM OR SERVICE: HCPCS

## 2025-08-22 PROCEDURE — 700102 HCHG RX REV CODE 250 W/ 637 OVERRIDE(OP): Performed by: STUDENT IN AN ORGANIZED HEALTH CARE EDUCATION/TRAINING PROGRAM

## 2025-08-22 PROCEDURE — A9270 NON-COVERED ITEM OR SERVICE: HCPCS | Performed by: HOSPITALIST

## 2025-08-22 PROCEDURE — 700102 HCHG RX REV CODE 250 W/ 637 OVERRIDE(OP)

## 2025-08-22 PROCEDURE — 700102 HCHG RX REV CODE 250 W/ 637 OVERRIDE(OP): Performed by: HOSPITALIST

## 2025-08-22 PROCEDURE — 97605 NEG PRS WND THER DME<=50SQCM: CPT

## 2025-08-22 PROCEDURE — 700102 HCHG RX REV CODE 250 W/ 637 OVERRIDE(OP): Performed by: INTERNAL MEDICINE

## 2025-08-22 PROCEDURE — A9270 NON-COVERED ITEM OR SERVICE: HCPCS | Performed by: STUDENT IN AN ORGANIZED HEALTH CARE EDUCATION/TRAINING PROGRAM

## 2025-08-22 PROCEDURE — A9270 NON-COVERED ITEM OR SERVICE: HCPCS | Performed by: INTERNAL MEDICINE

## 2025-08-22 RX ADMIN — Medication 100 MG: at 04:58

## 2025-08-22 RX ADMIN — Medication 1 CAPSULE: at 09:24

## 2025-08-22 RX ADMIN — MORPHINE SULFATE 30 MG: 30 TABLET, FILM COATED, EXTENDED RELEASE ORAL at 04:58

## 2025-08-22 RX ADMIN — TAMSULOSIN HYDROCHLORIDE 0.4 MG: 0.4 CAPSULE ORAL at 04:58

## 2025-08-22 RX ADMIN — CELECOXIB 100 MG: 100 CAPSULE ORAL at 04:58

## 2025-08-22 RX ADMIN — OXYCODONE HYDROCHLORIDE 20 MG: 10 TABLET ORAL at 10:18

## 2025-08-22 RX ADMIN — LEVOTHYROXINE SODIUM 112 MCG: 0.11 TABLET ORAL at 04:58

## 2025-08-22 RX ADMIN — THERA TABS 1 TABLET: TAB at 04:58

## 2025-08-22 ASSESSMENT — PAIN DESCRIPTION - PAIN TYPE
TYPE: ACUTE PAIN

## 2025-08-26 ENCOUNTER — OFFICE VISIT (OUTPATIENT)
Dept: WOUND CARE | Facility: MEDICAL CENTER | Age: 65
End: 2025-08-26
Attending: NURSE PRACTITIONER
Payer: MEDICAID

## 2025-08-26 DIAGNOSIS — L24.5 IRRITANT CONTACT DERMATITIS DUE TO OTHER CHEMICAL PRODUCTS: ICD-10-CM

## 2025-08-26 DIAGNOSIS — L02.31 ABSCESS, GLUTEAL, LEFT: Primary | ICD-10-CM

## 2025-08-26 DIAGNOSIS — R52 PAIN ASSOCIATED WITH WOUND: ICD-10-CM

## 2025-08-26 DIAGNOSIS — T14.8XXA PAIN ASSOCIATED WITH WOUND: ICD-10-CM

## 2025-08-26 PROCEDURE — 11042 DBRDMT SUBQ TIS 1ST 20SQCM/<: CPT

## 2025-08-26 PROCEDURE — 99213 OFFICE O/P EST LOW 20 MIN: CPT

## 2025-08-26 RX ORDER — LEVOTHYROXINE SODIUM 112 UG/1
112 TABLET ORAL
Qty: 90 TABLET | Refills: 1 | Status: CANCELLED | OUTPATIENT
Start: 2025-08-26

## 2025-08-28 ASSESSMENT — ENCOUNTER SYMPTOMS
VOMITING: 0
EYES NEGATIVE: 1
DIAPHORESIS: 0
WHEEZING: 0
NERVOUS/ANXIOUS: 0
BACK PAIN: 0
COUGH: 0
CHILLS: 0
WEAKNESS: 0
NAUSEA: 0
FALLS: 0
FEVER: 0
PALPITATIONS: 0
CLAUDICATION: 0
SHORTNESS OF BREATH: 0
DEPRESSION: 0

## 2025-08-29 ENCOUNTER — OFFICE VISIT (OUTPATIENT)
Dept: WOUND CARE | Facility: MEDICAL CENTER | Age: 65
End: 2025-08-29
Attending: NURSE PRACTITIONER
Payer: MEDICAID

## 2025-08-29 DIAGNOSIS — R52 PAIN ASSOCIATED WITH WOUND: ICD-10-CM

## 2025-08-29 DIAGNOSIS — L02.31 ABSCESS, GLUTEAL, LEFT: Primary | ICD-10-CM

## 2025-08-29 DIAGNOSIS — L24.5 IRRITANT CONTACT DERMATITIS DUE TO OTHER CHEMICAL PRODUCTS: ICD-10-CM

## 2025-08-29 DIAGNOSIS — T14.8XXA PAIN ASSOCIATED WITH WOUND: ICD-10-CM

## 2025-08-29 PROCEDURE — 97607 NEG PRS WND THR NDME<=50SQCM: CPT

## 2025-08-29 PROCEDURE — 99214 OFFICE O/P EST MOD 30 MIN: CPT

## (undated) DEVICE — TUBING CLEARLINK DUO-VENT - C-FLO (48EA/CA)

## (undated) DEVICE — SENSOR OXIMETER ADULT SPO2 RD SET (20EA/BX)

## (undated) DEVICE — GLOVE BIOGEL SZ 8 SURGICAL PF LTX - (50PR/BX 4BX/CA)

## (undated) DEVICE — GLOVE BIOGEL INDICATOR SZ 7.5 SURGICAL PF LTX - (50PR/BX 4BX/CA)

## (undated) DEVICE — SPONGE GAUZESTER 4 X 4 4PLY - (128PK/CA)

## (undated) DEVICE — PACK MINOR BASIN - (4EA/CA)

## (undated) DEVICE — STAPLER SKIN DISP - (6/BX 10BX/CA) VISISTAT

## (undated) DEVICE — CHLORAPREP 26 ML APPLICATOR - ORANGE TINT(25/CA)

## (undated) DEVICE — TRAY SURESTEP FOLEY TEMP SENSING 16FR SNAP SECURE(10EA/CA) ORDER #18764 FOR TEMP FOLEY ONLY

## (undated) DEVICE — SLEEVE VASO DVT COMPRESSION CALF MED - (10PR/CA)

## (undated) DEVICE — LACTATED RINGERS INJ 1000 ML - (14EA/CA 60CA/PF)

## (undated) DEVICE — GLOVE SZ 7.5 BIOGEL PI MICRO - PF LF (50PR/BX)

## (undated) DEVICE — COVER LIGHT HANDLE ALC PLUS DISP (18EA/BX)

## (undated) DEVICE — SUTURE GENERAL

## (undated) DEVICE — SET EXTENSION WITH 2 PORTS (48EA/CA) ***PART #2C8610 IS A SUBSTITUTE*****

## (undated) DEVICE — CANISTER SUCTION 3000ML MECHANICAL FILTER AUTO SHUTOFF MEDI-VAC NONSTERILE LF DISP (40EA/CA)

## (undated) DEVICE — SWAB CULTURE AMIES ESWAB (50EA/PK) ***50EA/PK****

## (undated) DEVICE — SET LEADWIRE 5 LEAD BEDSIDE DISPOSABLE ECG (1SET OF 5/EA)

## (undated) DEVICE — GLOVE BIOGEL PI INDICATOR SZ 7.5 SURGICAL PF LF -(50/BX 4BX/CA)

## (undated) DEVICE — GLOVE BIOGEL SZ 7.5 SURGICAL PF LTX - (50PR/BX 4BX/CA)

## (undated) DEVICE — GOWN WARMING STANDARD FLEX - (30/CA)

## (undated) DEVICE — SUCTION INSTRUMENT YANKAUER BULBOUS TIP W/O VENT (50EA/CA)

## (undated) DEVICE — SODIUM CHL IRRIGATION 0.9% 1000ML (12EA/CA)

## (undated) DEVICE — DRAPE LAPAROTOMY T SHEET - (12EA/CA)